# Patient Record
Sex: MALE | Race: BLACK OR AFRICAN AMERICAN | NOT HISPANIC OR LATINO | Employment: OTHER | URBAN - METROPOLITAN AREA
[De-identification: names, ages, dates, MRNs, and addresses within clinical notes are randomized per-mention and may not be internally consistent; named-entity substitution may affect disease eponyms.]

---

## 2023-03-08 ENCOUNTER — NURSING HOME VISIT (OUTPATIENT)
Dept: POST ACUTE CARE | Facility: EXTERNAL LOCATION | Age: 68
End: 2023-03-08
Payer: COMMERCIAL

## 2023-03-08 DIAGNOSIS — F20.9 SCHIZOPHRENIA, UNSPECIFIED TYPE (MULTI): ICD-10-CM

## 2023-03-08 DIAGNOSIS — R13.10 DYSPHAGIA, UNSPECIFIED TYPE: ICD-10-CM

## 2023-03-08 DIAGNOSIS — R05.1 ACUTE COUGH: Primary | ICD-10-CM

## 2023-03-08 DIAGNOSIS — M54.50 LOW BACK PAIN, UNSPECIFIED BACK PAIN LATERALITY, UNSPECIFIED CHRONICITY, UNSPECIFIED WHETHER SCIATICA PRESENT: ICD-10-CM

## 2023-03-08 PROCEDURE — 99309 SBSQ NF CARE MODERATE MDM 30: CPT | Performed by: REGISTERED NURSE

## 2023-03-08 NOTE — LETTER
Patient: Brannon Engel  : 1955    Encounter Date: 2023    Subjective  Chief complaint: Brannon Engel is a 68 y.o. male who is a long term resident patient being seen and evaluated for multiple medical problems.  Patient presents for cough     HPI:  HPI  patient has history of dysphagia refusing advised to use thickened liquids continues to use thin liquids history of recurrent pneumonia likely due to aspiration.  Started coughing approximately 1 to 2 days ago nurse notified me ordered DuoNeb aerosols and Mucinex and chest x-ray.  Patient seen at bedside denies shortness of breath fever chills    Review of Systems   Respiratory:  Positive for cough.      All systems reviewed and negative except for what was mentioned in the HPI    Vital signs: 132/90  97.2  98%  89    Objective  Physical Exam    Assessment/Plan  Problem List Items Addressed This Visit          Digestive    Dysphagia       Other    Acute cough - Primary    Schizophrenia (CMS/HCC)    Low back pain     Medications, treatments, and labs reviewed  Continue medications and treatments as listed in PCC        Electronically Signed By: PARI Leyva   3/30/23 11:34 AM

## 2023-03-08 NOTE — PROGRESS NOTES
Subjective   Chief complaint: Brannon Engel is a 68 y.o. male who is a long term resident patient being seen and evaluated for multiple medical problems.  Patient presents for cough     HPI:  HPI  patient has history of dysphagia refusing advised to use thickened liquids continues to use thin liquids history of recurrent pneumonia likely due to aspiration.  Started coughing approximately 1 to 2 days ago nurse notified me ordered DuoNeb aerosols and Mucinex and chest x-ray.  Patient seen at bedside denies shortness of breath fever chills    Review of Systems   Respiratory:  Positive for cough.      All systems reviewed and negative except for what was mentioned in the HPI    Vital signs: 132/90  97.2  98%  89    Objective   Physical Exam    Assessment/Plan   Problem List Items Addressed This Visit          Digestive    Dysphagia       Other    Acute cough - Primary    Schizophrenia (CMS/HCC)    Low back pain     Medications, treatments, and labs reviewed  Continue medications and treatments as listed in PCC

## 2023-03-20 ENCOUNTER — NURSING HOME VISIT (OUTPATIENT)
Dept: POST ACUTE CARE | Facility: EXTERNAL LOCATION | Age: 68
End: 2023-03-20
Payer: COMMERCIAL

## 2023-03-20 DIAGNOSIS — R13.10 DYSPHAGIA, UNSPECIFIED TYPE: ICD-10-CM

## 2023-03-20 DIAGNOSIS — R05.1 ACUTE COUGH: Primary | ICD-10-CM

## 2023-03-20 DIAGNOSIS — M54.50 LOW BACK PAIN, UNSPECIFIED BACK PAIN LATERALITY, UNSPECIFIED CHRONICITY, UNSPECIFIED WHETHER SCIATICA PRESENT: ICD-10-CM

## 2023-03-20 DIAGNOSIS — M19.90 OSTEOARTHRITIS, UNSPECIFIED OSTEOARTHRITIS TYPE, UNSPECIFIED SITE: ICD-10-CM

## 2023-03-20 DIAGNOSIS — F20.9 SCHIZOPHRENIA, UNSPECIFIED TYPE (MULTI): ICD-10-CM

## 2023-03-20 PROCEDURE — 99308 SBSQ NF CARE LOW MDM 20: CPT | Performed by: INTERNAL MEDICINE

## 2023-03-20 NOTE — LETTER
Patient: Brannon Engel  : 1955    Encounter Date: 2023    PROGRESS NOTE    Subjective  Chief complaint: Brannon Engel is a 68 y.o. male who is a long term resident patient being seen and evaluated for follow-up of PNA and change in diet.    HPI:  He has completed his course of Levaquin.   Speech therapy downgraded his diet to mechanical soft and thickened liquids but patient has been refusing.      Objective  Vital signs: 132/90,   Physical Exam  Constitutional:       Appearance: Normal appearance.   HENT:      Head: Normocephalic and atraumatic.      Nose: Nose normal.      Mouth/Throat:      Mouth: Mucous membranes are moist.      Pharynx: Oropharynx is clear.   Eyes:      Extraocular Movements: Extraocular movements intact.      Pupils: Pupils are equal, round, and reactive to light.   Cardiovascular:      Rate and Rhythm: Normal rate and regular rhythm.   Pulmonary:      Effort: No respiratory distress.      Breath sounds: Normal breath sounds. No wheezing, rhonchi or rales.   Abdominal:      General: Bowel sounds are normal. There is no distension.      Palpations: Abdomen is soft.      Tenderness: There is no abdominal tenderness. There is no guarding.   Musculoskeletal:      Right lower leg: No edema.      Left lower leg: No edema.   Skin:     General: Skin is warm and dry.   Neurological:      Mental Status: He is alert and oriented to person, place, and time. Mental status is at baseline.   Psychiatric:         Mood and Affect: Mood normal.         Behavior: Behavior normal.         Assessment/Plan  Problem List Items Addressed This Visit          Digestive    Dysphagia       Musculoskeletal    Osteoarthritis       Other    Acute cough - Primary    Schizophrenia (CMS/HCC)    Low back pain     Medications, treatments, and labs reviewed  Continue medications and treatments as listed in [facility]    Scribe Attestation  I, Patricia Madrid , Scribe   attest that this documentation has been prepared under  the direction and in the presence of Santana Bryson DO    Provider Attestation - Scribe documentation  All medical record entries made by the Scribe were at my direction and personally dictated by me. I have reviewed the chart and agree that the record accurately reflects my personal performance of the history, physical exam, discussion and plan.   Santana Bryson DO        Electronically Signed By: Santana Bryson DO   5/31/23  8:27 PM

## 2023-03-21 NOTE — PROGRESS NOTES
PROGRESS NOTE    Subjective   Chief complaint: Brannon Engel is a 68 y.o. male who is a long term resident patient being seen and evaluated for follow-up of PNA and change in diet.    HPI:  He has completed his course of Levaquin.   Speech therapy downgraded his diet to mechanical soft and thickened liquids but patient has been refusing.      Objective   Vital signs: 132/90,   Physical Exam  Constitutional:       Appearance: Normal appearance.   HENT:      Head: Normocephalic and atraumatic.      Nose: Nose normal.      Mouth/Throat:      Mouth: Mucous membranes are moist.      Pharynx: Oropharynx is clear.   Eyes:      Extraocular Movements: Extraocular movements intact.      Pupils: Pupils are equal, round, and reactive to light.   Cardiovascular:      Rate and Rhythm: Normal rate and regular rhythm.   Pulmonary:      Effort: No respiratory distress.      Breath sounds: Normal breath sounds. No wheezing, rhonchi or rales.   Abdominal:      General: Bowel sounds are normal. There is no distension.      Palpations: Abdomen is soft.      Tenderness: There is no abdominal tenderness. There is no guarding.   Musculoskeletal:      Right lower leg: No edema.      Left lower leg: No edema.   Skin:     General: Skin is warm and dry.   Neurological:      Mental Status: He is alert and oriented to person, place, and time. Mental status is at baseline.   Psychiatric:         Mood and Affect: Mood normal.         Behavior: Behavior normal.         Assessment/Plan   Problem List Items Addressed This Visit          Digestive    Dysphagia       Musculoskeletal    Osteoarthritis       Other    Acute cough - Primary    Schizophrenia (CMS/HCC)    Low back pain     Medications, treatments, and labs reviewed  Continue medications and treatments as listed in [facility]    Scribe Attestation  I, Ophelia Tamez   attest that this documentation has been prepared under the direction and in the presence of Santana Bryson,  DO    Provider Attestation - Scribe documentation  All medical record entries made by the Scribe were at my direction and personally dictated by me. I have reviewed the chart and agree that the record accurately reflects my personal performance of the history, physical exam, discussion and plan.   Santana Bryson, DO

## 2023-03-30 PROBLEM — R05.1 ACUTE COUGH: Status: ACTIVE | Noted: 2023-03-30

## 2023-03-30 PROBLEM — R13.10 DYSPHAGIA: Status: ACTIVE | Noted: 2023-03-30

## 2023-03-30 PROBLEM — F20.9 SCHIZOPHRENIA (MULTI): Status: ACTIVE | Noted: 2023-03-30

## 2023-03-30 PROBLEM — M54.50 LOW BACK PAIN: Status: ACTIVE | Noted: 2023-03-30

## 2023-03-30 ASSESSMENT — ENCOUNTER SYMPTOMS: COUGH: 1

## 2023-04-14 ENCOUNTER — NURSING HOME VISIT (OUTPATIENT)
Dept: POST ACUTE CARE | Facility: EXTERNAL LOCATION | Age: 68
End: 2023-04-14
Payer: COMMERCIAL

## 2023-04-14 DIAGNOSIS — M19.90 OSTEOARTHRITIS, UNSPECIFIED OSTEOARTHRITIS TYPE, UNSPECIFIED SITE: Primary | ICD-10-CM

## 2023-04-14 PROCEDURE — 99308 SBSQ NF CARE LOW MDM 20: CPT | Performed by: REGISTERED NURSE

## 2023-04-14 NOTE — LETTER
Patient: Brannon Engel  : 1955    Encounter Date: 2023    PROGRESS NOTE    Subjective  Chief complaint: Brannon Engel is a 68 y.o. male who is a long term care patient being seen and evaluated for knee pain    HPI:  HPI patient complaining of knee pain left knee pain 8 out of 10 patient found to be keeping ibuprofen at bedside and will DC  Objective  Vital signs:   133/68, 97.3, 76, 94%,   Physical Exam  Constitutional:       General: He is not in acute distress.  Eyes:      Extraocular Movements: Extraocular movements intact.   Pulmonary:      Effort: Pulmonary effort is normal.   Musculoskeletal:      Cervical back: Neck supple.   Neurological:      Mental Status: He is alert.   Psychiatric:         Mood and Affect: Mood normal.         Behavior: Behavior is cooperative.         Assessment/Plan  Problem List Items Addressed This Visit          Musculoskeletal    Osteoarthritis - Primary     Routine Tylenol continue lidocaine cream          Medications, treatments, and labs reviewed  Continue medications and treatments as listed in [facility]    Scribe Attestation  Patricia GIL Scribe   attest that this documentation has been prepared under the direction and in the presence of PARI Leyva    Provider Attestation - Scribe documentation  All medical record entries made by the Scribe were at my direction and personally dictated by me. I have reviewed the chart and agree that the record accurately reflects my personal performance of the history, physical exam, discussion and plan.   PARI Leyva        Electronically Signed By: PARI Leyva   5/15/23 12:31 PM

## 2023-04-14 NOTE — PROGRESS NOTES
PROGRESS NOTE    Subjective   Chief complaint: Brannon Engel is a 68 y.o. male who is a long term care patient being seen and evaluated for knee pain    HPI:  HPI patient complaining of knee pain left knee pain 8 out of 10 patient found to be keeping ibuprofen at bedside and will DC  Objective   Vital signs:   133/68, 97.3, 76, 94%,   Physical Exam  Constitutional:       General: He is not in acute distress.  Eyes:      Extraocular Movements: Extraocular movements intact.   Pulmonary:      Effort: Pulmonary effort is normal.   Musculoskeletal:      Cervical back: Neck supple.   Neurological:      Mental Status: He is alert.   Psychiatric:         Mood and Affect: Mood normal.         Behavior: Behavior is cooperative.         Assessment/Plan   Problem List Items Addressed This Visit          Musculoskeletal    Osteoarthritis - Primary     Routine Tylenol continue lidocaine cream          Medications, treatments, and labs reviewed  Continue medications and treatments as listed in [facility]    Scribe Attestation  Patricia GIL Scribe   attest that this documentation has been prepared under the direction and in the presence of PARI Leyva    Provider Attestation - Scribe documentation  All medical record entries made by the Scribe were at my direction and personally dictated by me. I have reviewed the chart and agree that the record accurately reflects my personal performance of the history, physical exam, discussion and plan.   PARI Leyva

## 2023-05-15 PROBLEM — M19.90 OSTEOARTHRITIS: Status: ACTIVE | Noted: 2023-05-15

## 2023-05-19 ENCOUNTER — NURSING HOME VISIT (OUTPATIENT)
Dept: POST ACUTE CARE | Facility: EXTERNAL LOCATION | Age: 68
End: 2023-05-19
Payer: COMMERCIAL

## 2023-05-19 DIAGNOSIS — F20.9 SCHIZOPHRENIA, UNSPECIFIED TYPE (MULTI): ICD-10-CM

## 2023-05-19 DIAGNOSIS — M19.90 OSTEOARTHRITIS, UNSPECIFIED OSTEOARTHRITIS TYPE, UNSPECIFIED SITE: ICD-10-CM

## 2023-05-19 DIAGNOSIS — I25.118 ATHEROSCLEROTIC HEART DISEASE OF NATIVE CORONARY ARTERY WITH OTHER FORMS OF ANGINA PECTORIS (CMS-HCC): ICD-10-CM

## 2023-05-19 DIAGNOSIS — Z79.4 TYPE 2 DIABETES MELLITUS WITH DIABETIC NEUROPATHY, WITH LONG-TERM CURRENT USE OF INSULIN (MULTI): ICD-10-CM

## 2023-05-19 DIAGNOSIS — F14.10 COCAINE ABUSE, UNCOMPLICATED (MULTI): Primary | ICD-10-CM

## 2023-05-19 DIAGNOSIS — M54.50 LOW BACK PAIN, UNSPECIFIED BACK PAIN LATERALITY, UNSPECIFIED CHRONICITY, UNSPECIFIED WHETHER SCIATICA PRESENT: ICD-10-CM

## 2023-05-19 DIAGNOSIS — E11.40 TYPE 2 DIABETES MELLITUS WITH DIABETIC NEUROPATHY, WITH LONG-TERM CURRENT USE OF INSULIN (MULTI): ICD-10-CM

## 2023-05-19 PROCEDURE — 99305 1ST NF CARE MODERATE MDM 35: CPT | Performed by: INTERNAL MEDICINE

## 2023-05-19 NOTE — LETTER
Patient: Brannon Engel  : 1955    Encounter Date: 2023    HISTORY & PHYSICAL    Subjective  Chief complaint: Brannon Engel is a 68 y.o. male who is a long term resident patient being seen and evaluated for multiple medical problems.  Patient presents for weakness.    HPI:  Patient was admitted to long term care facility. Pt has a history of GERD, HLD, and PVD. No acute complaints. No issues per nursing.         Past Medical History:   Diagnosis Date   • Dysphagia    • Emphysema lung (CMS/[facility])    • GERD (gastroesophageal reflux disease)    • HLD (hyperlipidemia)    • Hypertension, essential    • Low back pain    • PVD (peripheral vascular disease) (CMS/[facility])    • Schizophrenia (CMS/[facility])    • Type 2 diabetes mellitus (CMS/[facility])        No past surgical history on file.    Family History   Problem Relation Name Age of Onset   • No Known Problems Mother     • No Known Problems Father         Social History     Socioeconomic History   • Marital status: Single     Spouse name: Not on file   • Number of children: Not on file   • Years of education: Not on file   • Highest education level: Not on file   Occupational History   • Not on file   Tobacco Use   • Smoking status: Not on file   • Smokeless tobacco: Not on file   Substance and Sexual Activity   • Alcohol use: Not on file   • Drug use: Not on file   • Sexual activity: Not on file   Other Topics Concern   • Not on file   Social History Narrative   • Not on file     Social Determinants of Health     Financial Resource Strain: Not on file   Food Insecurity: Not on file   Transportation Needs: Not on file   Physical Activity: Not on file   Stress: Not on file   Social Connections: Not on file   Intimate Partner Violence: Not on file   Housing Stability: Not on file       Vital signs: 125/84, 98.3, 104, 18, 212.0, 95%    Objective  Physical Exam  HENT:      Head: Normocephalic and atraumatic.      Nose: Nose normal.      Mouth/Throat:      Mouth: Mucous  membranes are moist.      Pharynx: Oropharynx is clear.   Eyes:      Extraocular Movements: Extraocular movements intact.      Pupils: Pupils are equal, round, and reactive to light.   Cardiovascular:      Rate and Rhythm: Normal rate and regular rhythm.   Pulmonary:      Effort: No respiratory distress.      Breath sounds: Normal breath sounds. No wheezing, rhonchi or rales.   Abdominal:      General: Bowel sounds are normal. There is no distension.      Palpations: Abdomen is soft.      Tenderness: There is no abdominal tenderness. There is no guarding.   Musculoskeletal:      Right lower leg: No edema.      Left lower leg: No edema.   Skin:     General: Skin is warm and dry.   Neurological:      Mental Status: He is alert. Mental status is at baseline.   Psychiatric:         Mood and Affect: Mood normal.         Behavior: Behavior normal.         Assessment/Plan  Problem List Items Addressed This Visit       Schizophrenia (CMS/[facility])    Low back pain    Osteoarthritis    Cocaine abuse, uncomplicated (CMS/[facility]) - Primary    Type 2 diabetes mellitus with diabetic neuropathy, with long-term current use of insulin (CMS/[facility])    Atherosclerotic heart disease of native coronary artery with other forms of angina pectoris (CMS/[facility])     Medications, treatments, and labs reviewed  Continue medications and treatments as listed in PCC    Scribe Attestation  I, Ophelia Alicia   attest that this documentation has been prepared under the direction and in the presence of Santana Bryson DO.    An interactive audio and/or video telecommunication system which permits real time communications between the patient (at the originating site) and provider (at a distant site) was utilized to provide this telehealth service after obtaining verbal consent.    Provider Attestation - Scribe documentation  All medical record entries made by the Scribe were at my direction and personally dictated by me. I have reviewed the chart and  agree that the record accurately reflects my personal performance of the history, physical exam, discussion and plan.    Santana Bryson DO          Electronically Signed By: Santana Bryson DO   6/16/23 12:40 PM

## 2023-05-24 ENCOUNTER — NURSING HOME VISIT (OUTPATIENT)
Dept: POST ACUTE CARE | Facility: EXTERNAL LOCATION | Age: 68
End: 2023-05-24
Payer: COMMERCIAL

## 2023-05-24 DIAGNOSIS — M54.50 LOW BACK PAIN, UNSPECIFIED BACK PAIN LATERALITY, UNSPECIFIED CHRONICITY, UNSPECIFIED WHETHER SCIATICA PRESENT: ICD-10-CM

## 2023-05-24 DIAGNOSIS — M19.90 OSTEOARTHRITIS, UNSPECIFIED OSTEOARTHRITIS TYPE, UNSPECIFIED SITE: ICD-10-CM

## 2023-05-24 DIAGNOSIS — R13.10 DYSPHAGIA, UNSPECIFIED TYPE: Primary | ICD-10-CM

## 2023-05-24 PROCEDURE — 99308 SBSQ NF CARE LOW MDM 20: CPT | Performed by: REGISTERED NURSE

## 2023-05-24 NOTE — LETTER
Patient: Brannon Engel  : 1955    Encounter Date: 2023    PROGRESS NOTE    Subjective  Chief complaint: Brannon Engel is a 68 y.o. male who is a acute skilled care patient being seen and evaluated for weakness.    HPI:  23   Patient has been working in therapy on BLE strength training to improve balance, gait and stability.  Also working on gait training with focus on turning stability.  Patient is stable without complaint.  No new concerns reported by nursing.      Objective  Vital signs:  18, 125/84, 97.0, 104, 96%,   Physical Exam  Constitutional:       General: He is not in acute distress.  Eyes:      Extraocular Movements: Extraocular movements intact.   Pulmonary:      Effort: Pulmonary effort is normal.   Musculoskeletal:      Cervical back: Neck supple.   Neurological:      Mental Status: He is alert.   Psychiatric:         Mood and Affect: Mood normal.         Behavior: Behavior is cooperative.         Assessment/Plan  Problem List Items Addressed This Visit          Digestive    Dysphagia - Primary     Work with speech therapy as needed            Musculoskeletal    Osteoarthritis     Routine Tylenol continue lidocaine cream            Other    Low back pain     Pain stable monitor          Medications, treatments, and labs reviewed  Continue medications and treatments as listed in [facility]    Scribe Attestation  IPatricia Scribe   attest that this documentation has been prepared under the direction and in the presence of PARI Leyva    Provider Attestation - Scribe documentation  All medical record entries made by the Scribe were at my direction and personally dictated by me. I have reviewed the chart and agree that the record accurately reflects my personal performance of the history, physical exam, discussion and plan.   PARI Leyva        Electronically Signed By: PARI Leyva   6/15/23 11:27 AM

## 2023-06-16 PROBLEM — Z79.4 TYPE 2 DIABETES MELLITUS WITH DIABETIC NEUROPATHY, WITH LONG-TERM CURRENT USE OF INSULIN (MULTI): Status: ACTIVE | Noted: 2023-06-16

## 2023-06-16 PROBLEM — I25.118 ATHEROSCLEROTIC HEART DISEASE OF NATIVE CORONARY ARTERY WITH OTHER FORMS OF ANGINA PECTORIS (CMS-HCC): Status: ACTIVE | Noted: 2023-06-16

## 2023-06-16 PROBLEM — F14.10 COCAINE ABUSE, UNCOMPLICATED (MULTI): Status: ACTIVE | Noted: 2023-06-16

## 2023-06-16 PROBLEM — E11.40 TYPE 2 DIABETES MELLITUS WITH DIABETIC NEUROPATHY, WITH LONG-TERM CURRENT USE OF INSULIN (MULTI): Status: ACTIVE | Noted: 2023-06-16

## 2023-06-16 NOTE — PROGRESS NOTES
HISTORY & PHYSICAL    Subjective   Chief complaint: Brannon Engel is a 68 y.o. male who is a long term resident patient being seen and evaluated for multiple medical problems.  Patient presents for weakness.    HPI:  Patient was admitted to long term care facility. Pt has a history of GERD, HLD, and PVD. No acute complaints. No issues per nursing.         Past Medical History:   Diagnosis Date    Dysphagia     Emphysema lung (CMS/[facility])     GERD (gastroesophageal reflux disease)     HLD (hyperlipidemia)     Hypertension, essential     Low back pain     PVD (peripheral vascular disease) (CMS/[facility])     Schizophrenia (CMS/[facility])     Type 2 diabetes mellitus (CMS/[facility])        No past surgical history on file.    Family History   Problem Relation Name Age of Onset    No Known Problems Mother      No Known Problems Father         Social History     Socioeconomic History    Marital status: Single     Spouse name: Not on file    Number of children: Not on file    Years of education: Not on file    Highest education level: Not on file   Occupational History    Not on file   Tobacco Use    Smoking status: Not on file    Smokeless tobacco: Not on file   Substance and Sexual Activity    Alcohol use: Not on file    Drug use: Not on file    Sexual activity: Not on file   Other Topics Concern    Not on file   Social History Narrative    Not on file     Social Determinants of Health     Financial Resource Strain: Not on file   Food Insecurity: Not on file   Transportation Needs: Not on file   Physical Activity: Not on file   Stress: Not on file   Social Connections: Not on file   Intimate Partner Violence: Not on file   Housing Stability: Not on file       Vital signs: 125/84, 98.3, 104, 18, 212.0, 95%    Objective   Physical Exam  HENT:      Head: Normocephalic and atraumatic.      Nose: Nose normal.      Mouth/Throat:      Mouth: Mucous membranes are moist.      Pharynx: Oropharynx is clear.   Eyes:      Extraocular Movements:  Extraocular movements intact.      Pupils: Pupils are equal, round, and reactive to light.   Cardiovascular:      Rate and Rhythm: Normal rate and regular rhythm.   Pulmonary:      Effort: No respiratory distress.      Breath sounds: Normal breath sounds. No wheezing, rhonchi or rales.   Abdominal:      General: Bowel sounds are normal. There is no distension.      Palpations: Abdomen is soft.      Tenderness: There is no abdominal tenderness. There is no guarding.   Musculoskeletal:      Right lower leg: No edema.      Left lower leg: No edema.   Skin:     General: Skin is warm and dry.   Neurological:      Mental Status: He is alert. Mental status is at baseline.   Psychiatric:         Mood and Affect: Mood normal.         Behavior: Behavior normal.         Assessment/Plan   Problem List Items Addressed This Visit       Schizophrenia (CMS/[facility])    Low back pain    Osteoarthritis    Cocaine abuse, uncomplicated (CMS/[facility]) - Primary    Type 2 diabetes mellitus with diabetic neuropathy, with long-term current use of insulin (CMS/[facility])    Atherosclerotic heart disease of native coronary artery with other forms of angina pectoris (CMS/[facility])     Medications, treatments, and labs reviewed  Continue medications and treatments as listed in [facility]    Scribe Attestation  I, Ophelia Alicia   attest that this documentation has been prepared under the direction and in the presence of Santana Bryson DO.    An interactive audio and/or video telecommunication system which permits real time communications between the patient (at the originating site) and provider (at a distant site) was utilized to provide this telehealth service after obtaining verbal consent.    Provider Attestation - Scribe documentation  All medical record entries made by the Scribe were at my direction and personally dictated by me. I have reviewed the chart and agree that the record accurately reflects my personal performance of the history, physical  exam, discussion and plan.    Santana Bryson, DO

## 2023-09-25 LAB
BASOPHILS (10*3/UL) IN BLOOD BY AUTOMATED COUNT: 0.04 X10E9/L (ref 0–0.1)
BASOPHILS/100 LEUKOCYTES IN BLOOD BY AUTOMATED COUNT: 0.4 % (ref 0–2)
EOSINOPHILS (10*3/UL) IN BLOOD BY AUTOMATED COUNT: 0.26 X10E9/L (ref 0–0.7)
EOSINOPHILS/100 LEUKOCYTES IN BLOOD BY AUTOMATED COUNT: 2.9 % (ref 0–6)
ERYTHROCYTE DISTRIBUTION WIDTH (RATIO) BY AUTOMATED COUNT: 14.7 % (ref 11.5–14.5)
ERYTHROCYTE MEAN CORPUSCULAR HEMOGLOBIN CONCENTRATION (G/DL) BY AUTOMATED: 31.8 G/DL (ref 32–36)
ERYTHROCYTE MEAN CORPUSCULAR VOLUME (FL) BY AUTOMATED COUNT: 93 FL (ref 80–100)
ERYTHROCYTES (10*6/UL) IN BLOOD BY AUTOMATED COUNT: 4.37 X10E12/L (ref 4.5–5.9)
HEMATOCRIT (%) IN BLOOD BY AUTOMATED COUNT: 40.6 % (ref 41–52)
HEMOGLOBIN (G/DL) IN BLOOD: 12.9 G/DL (ref 13.5–17.5)
IMMATURE GRANULOCYTES/100 LEUKOCYTES IN BLOOD BY AUTOMATED COUNT: 0.6 % (ref 0–0.9)
LEUKOCYTES (10*3/UL) IN BLOOD BY AUTOMATED COUNT: 9 X10E9/L (ref 4.4–11.3)
LYMPHOCYTES (10*3/UL) IN BLOOD BY AUTOMATED COUNT: 2.37 X10E9/L (ref 1.2–4.8)
LYMPHOCYTES/100 LEUKOCYTES IN BLOOD BY AUTOMATED COUNT: 26.4 % (ref 13–44)
MONOCYTES (10*3/UL) IN BLOOD BY AUTOMATED COUNT: 0.84 X10E9/L (ref 0.1–1)
MONOCYTES/100 LEUKOCYTES IN BLOOD BY AUTOMATED COUNT: 9.4 % (ref 2–10)
NEUTROPHILS (10*3/UL) IN BLOOD BY AUTOMATED COUNT: 5.41 X10E9/L (ref 1.2–7.7)
NEUTROPHILS/100 LEUKOCYTES IN BLOOD BY AUTOMATED COUNT: 60.3 % (ref 40–80)
NRBC (PER 100 WBCS) BY AUTOMATED COUNT: 0 /100 WBC (ref 0–0)
PLATELETS (10*3/UL) IN BLOOD AUTOMATED COUNT: 212 X10E9/L (ref 150–450)

## 2023-11-20 ENCOUNTER — LAB REQUISITION (OUTPATIENT)
Dept: LAB | Facility: HOSPITAL | Age: 68
End: 2023-11-20
Payer: COMMERCIAL

## 2023-11-20 DIAGNOSIS — R06.2 WHEEZING: ICD-10-CM

## 2023-11-20 DIAGNOSIS — R53.1 WEAKNESS: ICD-10-CM

## 2023-11-20 LAB
ALBUMIN SERPL BCP-MCNC: 3.4 G/DL (ref 3.4–5)
ALP SERPL-CCNC: 87 U/L (ref 33–136)
ALT SERPL W P-5'-P-CCNC: 32 U/L (ref 10–52)
ANION GAP SERPL CALC-SCNC: 11 MMOL/L (ref 10–20)
AST SERPL W P-5'-P-CCNC: 27 U/L (ref 9–39)
BASOPHILS # BLD AUTO: 0.06 X10*3/UL (ref 0–0.1)
BASOPHILS NFR BLD AUTO: 0.5 %
BILIRUB SERPL-MCNC: 0.3 MG/DL (ref 0–1.2)
BUN SERPL-MCNC: 9 MG/DL (ref 6–23)
CALCIUM SERPL-MCNC: 9.1 MG/DL (ref 8.6–10.3)
CHLORIDE SERPL-SCNC: 107 MMOL/L (ref 98–107)
CO2 SERPL-SCNC: 26 MMOL/L (ref 21–32)
CREAT SERPL-MCNC: 1.13 MG/DL (ref 0.5–1.3)
EOSINOPHIL # BLD AUTO: 0.29 X10*3/UL (ref 0–0.7)
EOSINOPHIL NFR BLD AUTO: 2.5 %
ERYTHROCYTE [DISTWIDTH] IN BLOOD BY AUTOMATED COUNT: 14.4 % (ref 11.5–14.5)
GFR SERPL CREATININE-BSD FRML MDRD: 72 ML/MIN/1.73M*2
GLUCOSE SERPL-MCNC: 119 MG/DL (ref 74–99)
HCT VFR BLD AUTO: 44.8 % (ref 41–52)
HGB BLD-MCNC: 14.2 G/DL (ref 13.5–17.5)
IMM GRANULOCYTES # BLD AUTO: 0.05 X10*3/UL (ref 0–0.7)
IMM GRANULOCYTES NFR BLD AUTO: 0.4 % (ref 0–0.9)
LYMPHOCYTES # BLD AUTO: 2.33 X10*3/UL (ref 1.2–4.8)
LYMPHOCYTES NFR BLD AUTO: 20.1 %
MCH RBC QN AUTO: 29.8 PG (ref 26–34)
MCHC RBC AUTO-ENTMCNC: 31.7 G/DL (ref 32–36)
MCV RBC AUTO: 94 FL (ref 80–100)
MONOCYTES # BLD AUTO: 1.34 X10*3/UL (ref 0.1–1)
MONOCYTES NFR BLD AUTO: 11.6 %
NEUTROPHILS # BLD AUTO: 7.53 X10*3/UL (ref 1.2–7.7)
NEUTROPHILS NFR BLD AUTO: 64.9 %
NRBC BLD-RTO: 0 /100 WBCS (ref 0–0)
PLATELET # BLD AUTO: 310 X10*3/UL (ref 150–450)
POTASSIUM SERPL-SCNC: 4.3 MMOL/L (ref 3.5–5.3)
PROT SERPL-MCNC: 6.8 G/DL (ref 6.4–8.2)
RBC # BLD AUTO: 4.77 X10*6/UL (ref 4.5–5.9)
SODIUM SERPL-SCNC: 140 MMOL/L (ref 136–145)
WBC # BLD AUTO: 11.6 X10*3/UL (ref 4.4–11.3)

## 2023-11-20 PROCEDURE — 85025 COMPLETE CBC W/AUTO DIFF WBC: CPT

## 2023-11-20 PROCEDURE — 80053 COMPREHEN METABOLIC PANEL: CPT

## 2024-09-05 ENCOUNTER — HOSPITAL ENCOUNTER (EMERGENCY)
Facility: HOSPITAL | Age: 69
Discharge: HOME | End: 2024-09-06
Attending: EMERGENCY MEDICINE
Payer: COMMERCIAL

## 2024-09-05 ENCOUNTER — APPOINTMENT (OUTPATIENT)
Dept: RADIOLOGY | Facility: HOSPITAL | Age: 69
End: 2024-09-05
Payer: COMMERCIAL
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
## 2024-09-05 DIAGNOSIS — S80.00XA CONTUSION OF KNEE, UNSPECIFIED LATERALITY, INITIAL ENCOUNTER: ICD-10-CM

## 2024-09-05 LAB — GLUCOSE BLD MANUAL STRIP-MCNC: 77 MG/DL (ref 74–99)

## 2024-09-05 PROCEDURE — 72125 CT NECK SPINE W/O DYE: CPT

## 2024-09-05 PROCEDURE — 70450 CT HEAD/BRAIN W/O DYE: CPT | Performed by: RADIOLOGY

## 2024-09-05 PROCEDURE — 99285 EMERGENCY DEPT VISIT HI MDM: CPT

## 2024-09-05 PROCEDURE — 82947 ASSAY GLUCOSE BLOOD QUANT: CPT

## 2024-09-05 PROCEDURE — 72125 CT NECK SPINE W/O DYE: CPT | Performed by: RADIOLOGY

## 2024-09-05 PROCEDURE — 73564 X-RAY EXAM KNEE 4 OR MORE: CPT | Mod: 50

## 2024-09-05 PROCEDURE — 70450 CT HEAD/BRAIN W/O DYE: CPT

## 2024-09-05 PROCEDURE — 73564 X-RAY EXAM KNEE 4 OR MORE: CPT | Mod: BILATERAL PROCEDURE | Performed by: SURGERY

## 2024-09-05 RX ORDER — ACETAMINOPHEN 325 MG/1
975 TABLET ORAL ONCE
Status: DISCONTINUED | OUTPATIENT
Start: 2024-09-05 | End: 2024-09-06 | Stop reason: HOSPADM

## 2024-09-05 ASSESSMENT — PAIN DESCRIPTION - LOCATION: LOCATION: KNEE

## 2024-09-05 ASSESSMENT — PAIN DESCRIPTION - PROGRESSION: CLINICAL_PROGRESSION: NOT CHANGED

## 2024-09-05 ASSESSMENT — PAIN DESCRIPTION - ONSET: ONSET: ONGOING

## 2024-09-05 ASSESSMENT — PAIN - FUNCTIONAL ASSESSMENT: PAIN_FUNCTIONAL_ASSESSMENT: 0-10

## 2024-09-05 ASSESSMENT — PAIN DESCRIPTION - FREQUENCY: FREQUENCY: CONSTANT/CONTINUOUS

## 2024-09-05 ASSESSMENT — PAIN DESCRIPTION - ORIENTATION: ORIENTATION: LEFT;RIGHT

## 2024-09-05 ASSESSMENT — PAIN SCALES - GENERAL: PAINLEVEL_OUTOF10: 7

## 2024-09-06 VITALS
SYSTOLIC BLOOD PRESSURE: 131 MMHG | HEART RATE: 67 BPM | DIASTOLIC BLOOD PRESSURE: 76 MMHG | HEIGHT: 68 IN | WEIGHT: 170 LBS | OXYGEN SATURATION: 94 % | TEMPERATURE: 97.9 F | RESPIRATION RATE: 16 BRPM | BODY MASS INDEX: 25.76 KG/M2

## 2024-09-06 NOTE — ED TRIAGE NOTES
PT BIBA FROM NURSING FACILITY AFTER UNWITNESSED FALL. PT STATES PAIN TO BILATERAL KNEES. DENIES PAIN IN NECK, LOC, AND BACK PAIN. PT TAKES ASA DAILY.

## 2024-09-06 NOTE — ED PROVIDER NOTES
HPI   No chief complaint on file.      HPI  The patient states he fell yesterday morning and hit bilateral knees and his head.  He denies any loss consciousness.  The patient is currently oriented x 3.  He lives in a senior living facility.  He denies injury to his torso.  He denies any fever, cough or vomiting.  States that he only has pain in his knees at this time.      Patient History   Past Medical History:   Diagnosis Date    Dysphagia     Emphysema lung (Multi)     GERD (gastroesophageal reflux disease)     HLD (hyperlipidemia)     Hypertension, essential     Low back pain     PVD (peripheral vascular disease) (CMS-[facility])     Schizophrenia (Multi)     Type 2 diabetes mellitus (Multi)      No past surgical history on file.  Family History   Problem Relation Name Age of Onset    No Known Problems Mother      No Known Problems Father       Social History     Tobacco Use    Smoking status: Not on file    Smokeless tobacco: Not on file   Substance Use Topics    Alcohol use: Not on file    Drug use: Not on file       Physical Exam   ED Triage Vitals   Temperature Heart Rate Respirations BP   09/05/24 2136 09/05/24 2138 09/05/24 2136 09/05/24 2138   36.6 °C (97.9 °F) 85 20 105/77      Pulse Ox Temp Source Heart Rate Source Patient Position   09/05/24 2136 09/05/24 2136 09/05/24 2136 09/05/24 2136   97 % Temporal Monitor Sitting      BP Location FiO2 (%)     09/05/24 2136 --     Right arm        Physical Exam  Vitals and nursing note reviewed.   Constitutional:       General: He is not in acute distress.     Appearance: He is well-developed.   HENT:      Head: Normocephalic and atraumatic.   Eyes:      Conjunctiva/sclera: Conjunctivae normal.   Cardiovascular:      Rate and Rhythm: Normal rate and regular rhythm.      Heart sounds: No murmur heard.  Pulmonary:      Effort: Pulmonary effort is normal. No respiratory distress.      Breath sounds: Normal breath sounds.   Abdominal:      Palpations: Abdomen is soft.       Tenderness: There is no abdominal tenderness.   Musculoskeletal:         General: No swelling. Normal range of motion.      Cervical back: Neck supple.   Skin:     General: Skin is warm and dry.      Capillary Refill: Capillary refill takes less than 2 seconds.   Neurological:      Mental Status: He is alert.   Psychiatric:         Mood and Affect: Mood normal.         ED Course & MDM   Diagnoses as of 09/06/24 0625   Fall, initial encounter   Contusion of knee, unspecified laterality, initial encounter                 No data recorded     M[place]ika Coma Scale Score: 15 (09/05/24 2140 : Claudette Cortes RN)                           Medical Decision Making  Patient is full range of motion of his knees.  I doubt any injury here.  Given that he did hit his head and is above 67 obtain a CT of the head and neck. He is fully oriented and denies other complaints.  Will attempt ambulation after negative x-rays .  X-rays negative.  CT negative.  The patient returned back to his senior living facility.    Procedure  Procedures     Gaston Donald MD  09/06/24 0625

## 2024-11-07 ENCOUNTER — APPOINTMENT (OUTPATIENT)
Dept: RADIOLOGY | Facility: HOSPITAL | Age: 69
DRG: 690 | End: 2024-11-07
Payer: COMMERCIAL

## 2024-11-07 ENCOUNTER — HOSPITAL ENCOUNTER (INPATIENT)
Facility: HOSPITAL | Age: 69
End: 2024-11-07
Attending: EMERGENCY MEDICINE | Admitting: FAMILY MEDICINE
Payer: COMMERCIAL
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
## 2024-11-07 DIAGNOSIS — M25.551 PAIN OF RIGHT HIP: ICD-10-CM

## 2024-11-07 PROBLEM — R29.6 FALLS FREQUENTLY: Status: ACTIVE | Noted: 2024-11-07

## 2024-11-07 LAB
ALBUMIN SERPL BCP-MCNC: 3.8 G/DL (ref 3.4–5)
ALP SERPL-CCNC: 99 U/L (ref 33–136)
ALT SERPL W P-5'-P-CCNC: 25 U/L (ref 10–52)
ANION GAP SERPL CALC-SCNC: 11 MMOL/L (ref 10–20)
AST SERPL W P-5'-P-CCNC: 37 U/L (ref 9–39)
BASOPHILS # BLD AUTO: 0.05 X10*3/UL (ref 0–0.1)
BASOPHILS NFR BLD AUTO: 0.3 %
BILIRUB SERPL-MCNC: 0.6 MG/DL (ref 0–1.2)
BUN SERPL-MCNC: 9 MG/DL (ref 6–23)
CALCIUM SERPL-MCNC: 10 MG/DL (ref 8.6–10.3)
CHLORIDE SERPL-SCNC: 99 MMOL/L (ref 98–107)
CO2 SERPL-SCNC: 24 MMOL/L (ref 21–32)
CREAT SERPL-MCNC: 1.08 MG/DL (ref 0.5–1.3)
EGFRCR SERPLBLD CKD-EPI 2021: 75 ML/MIN/1.73M*2
EOSINOPHIL # BLD AUTO: 0.17 X10*3/UL (ref 0–0.7)
EOSINOPHIL NFR BLD AUTO: 1.1 %
ERYTHROCYTE [DISTWIDTH] IN BLOOD BY AUTOMATED COUNT: 13.3 % (ref 11.5–14.5)
GLUCOSE BLD MANUAL STRIP-MCNC: 142 MG/DL (ref 74–99)
GLUCOSE BLD MANUAL STRIP-MCNC: 147 MG/DL (ref 74–99)
GLUCOSE SERPL-MCNC: 90 MG/DL (ref 74–99)
HCT VFR BLD AUTO: 44.7 % (ref 41–52)
HGB BLD-MCNC: 14.6 G/DL (ref 13.5–17.5)
IMM GRANULOCYTES # BLD AUTO: 0.06 X10*3/UL (ref 0–0.7)
IMM GRANULOCYTES NFR BLD AUTO: 0.4 % (ref 0–0.9)
LYMPHOCYTES # BLD AUTO: 1.54 X10*3/UL (ref 1.2–4.8)
LYMPHOCYTES NFR BLD AUTO: 9.9 %
MCH RBC QN AUTO: 29.7 PG (ref 26–34)
MCHC RBC AUTO-ENTMCNC: 32.7 G/DL (ref 32–36)
MCV RBC AUTO: 91 FL (ref 80–100)
MONOCYTES # BLD AUTO: 1.39 X10*3/UL (ref 0.1–1)
MONOCYTES NFR BLD AUTO: 9 %
NEUTROPHILS # BLD AUTO: 12.31 X10*3/UL (ref 1.2–7.7)
NEUTROPHILS NFR BLD AUTO: 79.3 %
NRBC BLD-RTO: 0 /100 WBCS (ref 0–0)
PLATELET # BLD AUTO: 290 X10*3/UL (ref 150–450)
POTASSIUM SERPL-SCNC: 5 MMOL/L (ref 3.5–5.3)
PROT SERPL-MCNC: 7.1 G/DL (ref 6.4–8.2)
RBC # BLD AUTO: 4.91 X10*6/UL (ref 4.5–5.9)
SODIUM SERPL-SCNC: 129 MMOL/L (ref 136–145)
WBC # BLD AUTO: 15.5 X10*3/UL (ref 4.4–11.3)

## 2024-11-07 PROCEDURE — G0378 HOSPITAL OBSERVATION PER HR: HCPCS

## 2024-11-07 PROCEDURE — 72125 CT NECK SPINE W/O DYE: CPT

## 2024-11-07 PROCEDURE — 2500000002 HC RX 250 W HCPCS SELF ADMINISTERED DRUGS (ALT 637 FOR MEDICARE OP, ALT 636 FOR OP/ED): Performed by: NURSE PRACTITIONER

## 2024-11-07 PROCEDURE — 84075 ASSAY ALKALINE PHOSPHATASE: CPT | Performed by: EMERGENCY MEDICINE

## 2024-11-07 PROCEDURE — 72125 CT NECK SPINE W/O DYE: CPT | Performed by: RADIOLOGY

## 2024-11-07 PROCEDURE — 2500000001 HC RX 250 WO HCPCS SELF ADMINISTERED DRUGS (ALT 637 FOR MEDICARE OP): Performed by: NURSE PRACTITIONER

## 2024-11-07 PROCEDURE — 82947 ASSAY GLUCOSE BLOOD QUANT: CPT

## 2024-11-07 PROCEDURE — 96372 THER/PROPH/DIAG INJ SC/IM: CPT | Performed by: NURSE PRACTITIONER

## 2024-11-07 PROCEDURE — 2500000001 HC RX 250 WO HCPCS SELF ADMINISTERED DRUGS (ALT 637 FOR MEDICARE OP): Performed by: EMERGENCY MEDICINE

## 2024-11-07 PROCEDURE — 73502 X-RAY EXAM HIP UNI 2-3 VIEWS: CPT | Mod: RT

## 2024-11-07 PROCEDURE — 70450 CT HEAD/BRAIN W/O DYE: CPT | Performed by: RADIOLOGY

## 2024-11-07 PROCEDURE — 36415 COLL VENOUS BLD VENIPUNCTURE: CPT | Performed by: EMERGENCY MEDICINE

## 2024-11-07 PROCEDURE — 85025 COMPLETE CBC W/AUTO DIFF WBC: CPT | Performed by: EMERGENCY MEDICINE

## 2024-11-07 PROCEDURE — 73502 X-RAY EXAM HIP UNI 2-3 VIEWS: CPT | Mod: RIGHT SIDE | Performed by: RADIOLOGY

## 2024-11-07 PROCEDURE — 2500000005 HC RX 250 GENERAL PHARMACY W/O HCPCS: Performed by: NURSE PRACTITIONER

## 2024-11-07 PROCEDURE — 96374 THER/PROPH/DIAG INJ IV PUSH: CPT

## 2024-11-07 PROCEDURE — 99285 EMERGENCY DEPT VISIT HI MDM: CPT | Mod: 25

## 2024-11-07 PROCEDURE — 2500000004 HC RX 250 GENERAL PHARMACY W/ HCPCS (ALT 636 FOR OP/ED): Performed by: EMERGENCY MEDICINE

## 2024-11-07 PROCEDURE — 2500000004 HC RX 250 GENERAL PHARMACY W/ HCPCS (ALT 636 FOR OP/ED): Performed by: NURSE PRACTITIONER

## 2024-11-07 PROCEDURE — 70450 CT HEAD/BRAIN W/O DYE: CPT

## 2024-11-07 RX ORDER — ZOLPIDEM TARTRATE 5 MG/1
5 TABLET ORAL
COMMUNITY
Start: 2024-09-19 | End: 2024-11-20 | Stop reason: HOSPADM

## 2024-11-07 RX ORDER — ESOMEPRAZOLE MAGNESIUM 40 MG/1
40 CAPSULE, DELAYED RELEASE ORAL 2 TIMES DAILY
COMMUNITY

## 2024-11-07 RX ORDER — INSULIN LISPRO 200 [IU]/ML
13 INJECTION, SOLUTION SUBCUTANEOUS
COMMUNITY
Start: 2024-09-17 | End: 2024-11-20 | Stop reason: HOSPADM

## 2024-11-07 RX ORDER — SEMAGLUTIDE 1.34 MG/ML
0.25 INJECTION, SOLUTION SUBCUTANEOUS WEEKLY
COMMUNITY

## 2024-11-07 RX ORDER — ASPIRIN 81 MG/1
81 TABLET ORAL DAILY
COMMUNITY

## 2024-11-07 RX ORDER — BISMUTH SUBSALICYLATE 262 MG
1 TABLET,CHEWABLE ORAL DAILY
COMMUNITY

## 2024-11-07 RX ORDER — FLUTICASONE PROPIONATE 50 MCG
1 SPRAY, SUSPENSION (ML) NASAL DAILY
COMMUNITY

## 2024-11-07 RX ORDER — ATORVASTATIN CALCIUM 80 MG/1
80 TABLET, FILM COATED ORAL DAILY
COMMUNITY

## 2024-11-07 RX ORDER — DOCUSATE SODIUM 100 MG/1
100 CAPSULE, LIQUID FILLED ORAL 2 TIMES DAILY
COMMUNITY

## 2024-11-07 RX ORDER — TRIHEXYPHENIDYL HYDROCHLORIDE 2 MG/1
2 TABLET ORAL 2 TIMES DAILY
COMMUNITY
Start: 2024-09-19

## 2024-11-07 RX ORDER — QUETIAPINE FUMARATE 25 MG/1
TABLET, FILM COATED ORAL
COMMUNITY
Start: 2024-09-19

## 2024-11-07 RX ORDER — LITHIUM CARBONATE 300 MG/1
300 TABLET, FILM COATED, EXTENDED RELEASE ORAL 2 TIMES DAILY
COMMUNITY
Start: 2024-09-19

## 2024-11-07 RX ORDER — TAMSULOSIN HYDROCHLORIDE 0.4 MG/1
0.4 CAPSULE ORAL DAILY
Status: DISCONTINUED | OUTPATIENT
Start: 2024-11-07 | End: 2024-11-20 | Stop reason: HOSPADM

## 2024-11-07 RX ORDER — TRAZODONE HYDROCHLORIDE 50 MG/1
125 TABLET ORAL NIGHTLY
COMMUNITY
Start: 2024-09-19 | End: 2024-11-20 | Stop reason: HOSPADM

## 2024-11-07 RX ORDER — LANOLIN ALCOHOL/MO/W.PET/CERES
1000 CREAM (GRAM) TOPICAL DAILY
COMMUNITY

## 2024-11-07 RX ORDER — VARENICLINE TARTRATE 1 MG/1
1 TABLET, FILM COATED ORAL 2 TIMES DAILY
COMMUNITY

## 2024-11-07 RX ORDER — LITHIUM CARBONATE 300 MG/1
300 TABLET, FILM COATED, EXTENDED RELEASE ORAL 2 TIMES DAILY
Status: DISCONTINUED | OUTPATIENT
Start: 2024-11-07 | End: 2024-11-20 | Stop reason: HOSPADM

## 2024-11-07 RX ORDER — OXYBUTYNIN CHLORIDE 5 MG/1
15 TABLET, EXTENDED RELEASE ORAL DAILY
COMMUNITY
End: 2024-11-20 | Stop reason: HOSPADM

## 2024-11-07 RX ORDER — ACETAMINOPHEN 650 MG/1
650 SUPPOSITORY RECTAL EVERY 4 HOURS PRN
Status: DISCONTINUED | OUTPATIENT
Start: 2024-11-07 | End: 2024-11-20 | Stop reason: HOSPADM

## 2024-11-07 RX ORDER — POLYETHYLENE GLYCOL 3350 17 G/17G
17 POWDER, FOR SOLUTION ORAL DAILY
Status: DISCONTINUED | OUTPATIENT
Start: 2024-11-07 | End: 2024-11-20 | Stop reason: HOSPADM

## 2024-11-07 RX ORDER — ACETAMINOPHEN 500 MG
1000 TABLET ORAL EVERY 8 HOURS PRN
COMMUNITY
End: 2024-11-20 | Stop reason: HOSPADM

## 2024-11-07 RX ORDER — TRAZODONE HYDROCHLORIDE 50 MG/1
50 TABLET ORAL NIGHTLY
Status: DISCONTINUED | OUTPATIENT
Start: 2024-11-07 | End: 2024-11-20 | Stop reason: HOSPADM

## 2024-11-07 RX ORDER — KETOROLAC TROMETHAMINE 30 MG/ML
15 INJECTION, SOLUTION INTRAMUSCULAR; INTRAVENOUS ONCE
Status: COMPLETED | OUTPATIENT
Start: 2024-11-07 | End: 2024-11-07

## 2024-11-07 RX ORDER — QUETIAPINE FUMARATE 25 MG/1
25 TABLET, FILM COATED ORAL NIGHTLY
Status: DISCONTINUED | OUTPATIENT
Start: 2024-11-07 | End: 2024-11-20 | Stop reason: HOSPADM

## 2024-11-07 RX ORDER — DULOXETIN HYDROCHLORIDE 60 MG/1
60 CAPSULE, DELAYED RELEASE ORAL DAILY
COMMUNITY

## 2024-11-07 RX ORDER — TRIAMCINOLONE ACETONIDE 1 MG/G
CREAM TOPICAL 2 TIMES DAILY
COMMUNITY
Start: 2024-04-29 | End: 2024-11-20 | Stop reason: HOSPADM

## 2024-11-07 RX ORDER — HYDROCORTISONE ACETATE 25 MG/1
25 SUPPOSITORY RECTAL 2 TIMES DAILY PRN
COMMUNITY

## 2024-11-07 RX ORDER — TRIHEXYPHENIDYL HYDROCHLORIDE 2 MG/1
2 TABLET ORAL
Status: DISCONTINUED | OUTPATIENT
Start: 2024-11-07 | End: 2024-11-20 | Stop reason: HOSPADM

## 2024-11-07 RX ORDER — ISOSORBIDE MONONITRATE 60 MG/1
60 TABLET, EXTENDED RELEASE ORAL DAILY
COMMUNITY

## 2024-11-07 RX ORDER — IBUPROFEN 200 MG
200 TABLET ORAL EVERY 6 HOURS PRN
COMMUNITY
End: 2024-11-20 | Stop reason: HOSPADM

## 2024-11-07 RX ORDER — INSULIN GLARGINE 100 [IU]/ML
44 INJECTION, SOLUTION SUBCUTANEOUS NIGHTLY
COMMUNITY

## 2024-11-07 RX ORDER — ACETAMINOPHEN 325 MG/1
975 TABLET ORAL ONCE
Status: COMPLETED | OUTPATIENT
Start: 2024-11-07 | End: 2024-11-07

## 2024-11-07 RX ORDER — PREGABALIN 200 MG/1
200 CAPSULE ORAL 2 TIMES DAILY
COMMUNITY

## 2024-11-07 RX ORDER — INSULIN LISPRO 100 [IU]/ML
13 INJECTION, SOLUTION INTRAVENOUS; SUBCUTANEOUS
Status: DISCONTINUED | OUTPATIENT
Start: 2024-11-07 | End: 2024-11-08

## 2024-11-07 RX ORDER — ADHESIVE BANDAGE
30 BANDAGE TOPICAL DAILY PRN
COMMUNITY
End: 2024-11-20 | Stop reason: HOSPADM

## 2024-11-07 RX ORDER — ONDANSETRON 4 MG/1
4 TABLET, ORALLY DISINTEGRATING ORAL ONCE
Status: DISCONTINUED | OUTPATIENT
Start: 2024-11-07 | End: 2024-11-20 | Stop reason: HOSPADM

## 2024-11-07 RX ORDER — ACETAMINOPHEN 325 MG/1
650 TABLET ORAL EVERY 4 HOURS PRN
Status: DISCONTINUED | OUTPATIENT
Start: 2024-11-07 | End: 2024-11-20 | Stop reason: HOSPADM

## 2024-11-07 RX ORDER — ENOXAPARIN SODIUM 100 MG/ML
40 INJECTION SUBCUTANEOUS EVERY 24 HOURS
Status: DISCONTINUED | OUTPATIENT
Start: 2024-11-07 | End: 2024-11-20 | Stop reason: HOSPADM

## 2024-11-07 RX ORDER — DICLOFENAC SODIUM 10 MG/G
4 GEL TOPICAL EVERY 2 HOUR PRN
COMMUNITY

## 2024-11-07 RX ORDER — NITROGLYCERIN 0.4 MG/1
0.4 TABLET SUBLINGUAL EVERY 5 MIN PRN
COMMUNITY

## 2024-11-07 RX ORDER — OXYCODONE HYDROCHLORIDE 5 MG/1
5 CAPSULE ORAL EVERY 6 HOURS PRN
COMMUNITY
End: 2024-11-20 | Stop reason: HOSPADM

## 2024-11-07 RX ORDER — ACETAMINOPHEN 160 MG/5ML
650 SOLUTION ORAL EVERY 4 HOURS PRN
Status: DISCONTINUED | OUTPATIENT
Start: 2024-11-07 | End: 2024-11-20 | Stop reason: HOSPADM

## 2024-11-07 RX ORDER — CHOLECALCIFEROL (VITAMIN D3) 25 MCG
1000 TABLET ORAL DAILY
COMMUNITY

## 2024-11-07 RX ORDER — DEXTROSE 40 %
15 GEL (GRAM) ORAL AS NEEDED
COMMUNITY

## 2024-11-07 RX ORDER — IPRATROPIUM BROMIDE AND ALBUTEROL SULFATE 2.5; .5 MG/3ML; MG/3ML
3 SOLUTION RESPIRATORY (INHALATION)
COMMUNITY
End: 2024-11-20 | Stop reason: HOSPADM

## 2024-11-07 RX ORDER — TAMSULOSIN HYDROCHLORIDE 0.4 MG/1
0.4 CAPSULE ORAL
Status: ON HOLD | COMMUNITY
Start: 2024-10-10 | End: 2024-11-11

## 2024-11-07 RX ORDER — LIDOCAINE 560 MG/1
1 PATCH PERCUTANEOUS; TOPICAL; TRANSDERMAL DAILY
Status: DISCONTINUED | OUTPATIENT
Start: 2024-11-07 | End: 2024-11-20 | Stop reason: HOSPADM

## 2024-11-07 RX ORDER — FERROUS SULFATE 325(65) MG
65 TABLET, DELAYED RELEASE (ENTERIC COATED) ORAL DAILY
COMMUNITY

## 2024-11-07 RX ORDER — FERROUS SULFATE, DRIED 160(50) MG
1 TABLET, EXTENDED RELEASE ORAL 2 TIMES DAILY
COMMUNITY

## 2024-11-07 RX ADMIN — LITHIUM CARBONATE 300 MG: 300 TABLET, EXTENDED RELEASE ORAL at 20:33

## 2024-11-07 RX ADMIN — LIDOCAINE 4% 1 PATCH: 40 PATCH TOPICAL at 17:58

## 2024-11-07 RX ADMIN — ACETAMINOPHEN 325MG 650 MG: 325 TABLET ORAL at 20:38

## 2024-11-07 RX ADMIN — TRIHEXYPHENIDYL HYDROCHLORIDE 2 MG: 2 TABLET ORAL at 18:48

## 2024-11-07 RX ADMIN — TRAZODONE HYDROCHLORIDE 50 MG: 50 TABLET ORAL at 20:33

## 2024-11-07 RX ADMIN — ACETAMINOPHEN 975 MG: 325 TABLET, FILM COATED ORAL at 11:25

## 2024-11-07 RX ADMIN — POLYETHYLENE GLYCOL 3350 17 G: 17 POWDER, FOR SOLUTION ORAL at 18:04

## 2024-11-07 RX ADMIN — KETOROLAC TROMETHAMINE 15 MG: 30 INJECTION, SOLUTION INTRAMUSCULAR at 13:50

## 2024-11-07 RX ADMIN — TAMSULOSIN HYDROCHLORIDE 0.4 MG: 0.4 CAPSULE ORAL at 17:58

## 2024-11-07 RX ADMIN — QUETIAPINE FUMARATE 25 MG: 25 TABLET ORAL at 20:33

## 2024-11-07 SDOH — SOCIAL STABILITY: SOCIAL INSECURITY: HAVE YOU HAD ANY THOUGHTS OF HARMING ANYONE ELSE?: NO

## 2024-11-07 SDOH — SOCIAL STABILITY: SOCIAL INSECURITY
WITHIN THE LAST YEAR, HAVE YOU BEEN RAPED OR FORCED TO HAVE ANY KIND OF SEXUAL ACTIVITY BY YOUR PARTNER OR EX-PARTNER?: NO

## 2024-11-07 SDOH — ECONOMIC STABILITY: FOOD INSECURITY
HOW HARD IS IT FOR YOU TO PAY FOR THE VERY BASICS LIKE FOOD, HOUSING, MEDICAL CARE, AND HEATING?: PATIENT UNABLE TO ANSWER

## 2024-11-07 SDOH — ECONOMIC STABILITY: HOUSING INSECURITY
IN THE LAST 12 MONTHS, WAS THERE A TIME WHEN YOU WERE NOT ABLE TO PAY THE MORTGAGE OR RENT ON TIME?: PATIENT UNABLE TO ANSWER

## 2024-11-07 SDOH — ECONOMIC STABILITY: INCOME INSECURITY: IN THE PAST 12 MONTHS HAS THE ELECTRIC, GAS, OIL, OR WATER COMPANY THREATENED TO SHUT OFF SERVICES IN YOUR HOME?: NO

## 2024-11-07 SDOH — SOCIAL STABILITY: SOCIAL INSECURITY: DO YOU FEEL ANYONE HAS EXPLOITED OR TAKEN ADVANTAGE OF YOU FINANCIALLY OR OF YOUR PERSONAL PROPERTY?: NO

## 2024-11-07 SDOH — ECONOMIC STABILITY: FOOD INSECURITY
WITHIN THE PAST 12 MONTHS, THE FOOD YOU BOUGHT JUST DIDN'T LAST AND YOU DIDN'T HAVE MONEY TO GET MORE.: PATIENT UNABLE TO ANSWER

## 2024-11-07 SDOH — SOCIAL STABILITY: SOCIAL INSECURITY: WITHIN THE LAST YEAR, HAVE YOU BEEN AFRAID OF YOUR PARTNER OR EX-PARTNER?: NO

## 2024-11-07 SDOH — SOCIAL STABILITY: SOCIAL INSECURITY: ARE THERE ANY APPARENT SIGNS OF INJURIES/BEHAVIORS THAT COULD BE RELATED TO ABUSE/NEGLECT?: NO

## 2024-11-07 SDOH — HEALTH STABILITY: PHYSICAL HEALTH: ON AVERAGE, HOW MANY MINUTES DO YOU ENGAGE IN EXERCISE AT THIS LEVEL?: 0 MIN

## 2024-11-07 SDOH — ECONOMIC STABILITY: TRANSPORTATION INSECURITY
IN THE PAST 12 MONTHS, HAS LACK OF TRANSPORTATION KEPT YOU FROM MEDICAL APPOINTMENTS OR FROM GETTING MEDICATIONS?: PATIENT UNABLE TO ANSWER

## 2024-11-07 SDOH — SOCIAL STABILITY: SOCIAL INSECURITY: HAVE YOU HAD THOUGHTS OF HARMING ANYONE ELSE?: NO

## 2024-11-07 SDOH — SOCIAL STABILITY: SOCIAL INSECURITY: WERE YOU ABLE TO COMPLETE ALL THE BEHAVIORAL HEALTH SCREENINGS?: YES

## 2024-11-07 SDOH — HEALTH STABILITY: PHYSICAL HEALTH: ON AVERAGE, HOW MANY DAYS PER WEEK DO YOU ENGAGE IN MODERATE TO STRENUOUS EXERCISE (LIKE A BRISK WALK)?: 0 DAYS

## 2024-11-07 SDOH — ECONOMIC STABILITY: HOUSING INSECURITY: AT ANY TIME IN THE PAST 12 MONTHS, WERE YOU HOMELESS OR LIVING IN A SHELTER (INCLUDING NOW)?: PATIENT UNABLE TO ANSWER

## 2024-11-07 SDOH — SOCIAL STABILITY: SOCIAL INSECURITY: DOES ANYONE TRY TO KEEP YOU FROM HAVING/CONTACTING OTHER FRIENDS OR DOING THINGS OUTSIDE YOUR HOME?: NO

## 2024-11-07 SDOH — SOCIAL STABILITY: SOCIAL INSECURITY
WITHIN THE LAST YEAR, HAVE YOU BEEN KICKED, HIT, SLAPPED, OR OTHERWISE PHYSICALLY HURT BY YOUR PARTNER OR EX-PARTNER?: NO

## 2024-11-07 SDOH — SOCIAL STABILITY: SOCIAL INSECURITY: ABUSE: ADULT

## 2024-11-07 SDOH — ECONOMIC STABILITY: FOOD INSECURITY
WITHIN THE PAST 12 MONTHS, YOU WORRIED THAT YOUR FOOD WOULD RUN OUT BEFORE YOU GOT THE MONEY TO BUY MORE.: PATIENT UNABLE TO ANSWER

## 2024-11-07 SDOH — SOCIAL STABILITY: SOCIAL INSECURITY: WITHIN THE LAST YEAR, HAVE YOU BEEN HUMILIATED OR EMOTIONALLY ABUSED IN OTHER WAYS BY YOUR PARTNER OR EX-PARTNER?: NO

## 2024-11-07 SDOH — SOCIAL STABILITY: SOCIAL INSECURITY: HAS ANYONE EVER THREATENED TO HURT YOUR FAMILY OR YOUR PETS?: NO

## 2024-11-07 SDOH — SOCIAL STABILITY: SOCIAL INSECURITY: DO YOU FEEL UNSAFE GOING BACK TO THE PLACE WHERE YOU ARE LIVING?: NO

## 2024-11-07 SDOH — SOCIAL STABILITY: SOCIAL INSECURITY: ARE YOU OR HAVE YOU BEEN THREATENED OR ABUSED PHYSICALLY, EMOTIONALLY, OR SEXUALLY BY ANYONE?: NO

## 2024-11-07 ASSESSMENT — PAIN DESCRIPTION - LOCATION
LOCATION: BACK
LOCATION: BUTTOCKS

## 2024-11-07 ASSESSMENT — COLUMBIA-SUICIDE SEVERITY RATING SCALE - C-SSRS
6. HAVE YOU EVER DONE ANYTHING, STARTED TO DO ANYTHING, OR PREPARED TO DO ANYTHING TO END YOUR LIFE?: NO
2. HAVE YOU ACTUALLY HAD ANY THOUGHTS OF KILLING YOURSELF?: NO
1. IN THE PAST MONTH, HAVE YOU WISHED YOU WERE DEAD OR WISHED YOU COULD GO TO SLEEP AND NOT WAKE UP?: NO

## 2024-11-07 ASSESSMENT — COGNITIVE AND FUNCTIONAL STATUS - GENERAL
WALKING IN HOSPITAL ROOM: A LITTLE
MOVING FROM LYING ON BACK TO SITTING ON SIDE OF FLAT BED WITH BEDRAILS: A LITTLE
TURNING FROM BACK TO SIDE WHILE IN FLAT BAD: A LITTLE
MOVING TO AND FROM BED TO CHAIR: A LITTLE
STANDING UP FROM CHAIR USING ARMS: A LITTLE
DRESSING REGULAR LOWER BODY CLOTHING: A LITTLE
CLIMB 3 TO 5 STEPS WITH RAILING: A LITTLE
MOVING FROM LYING ON BACK TO SITTING ON SIDE OF FLAT BED WITH BEDRAILS: A LITTLE
DAILY ACTIVITIY SCORE: 19
DRESSING REGULAR UPPER BODY CLOTHING: A LITTLE
DRESSING REGULAR LOWER BODY CLOTHING: A LITTLE
WALKING IN HOSPITAL ROOM: A LITTLE
TOILETING: A LITTLE
HELP NEEDED FOR BATHING: A LITTLE
DRESSING REGULAR UPPER BODY CLOTHING: A LITTLE
PERSONAL GROOMING: A LITTLE
TURNING FROM BACK TO SIDE WHILE IN FLAT BAD: A LITTLE
MOBILITY SCORE: 18
TOILETING: A LITTLE
DAILY ACTIVITIY SCORE: 19
CLIMB 3 TO 5 STEPS WITH RAILING: A LITTLE
MOBILITY SCORE: 18
PATIENT BASELINE BEDBOUND: NO
STANDING UP FROM CHAIR USING ARMS: A LITTLE
PERSONAL GROOMING: A LITTLE
HELP NEEDED FOR BATHING: A LITTLE
MOVING TO AND FROM BED TO CHAIR: A LITTLE

## 2024-11-07 ASSESSMENT — ACTIVITIES OF DAILY LIVING (ADL)
ADEQUATE_TO_COMPLETE_ADL: YES
WALKS IN HOME: INDEPENDENT
LACK_OF_TRANSPORTATION: PATIENT UNABLE TO ANSWER
BATHING: NEEDS ASSISTANCE
FEEDING YOURSELF: NEEDS ASSISTANCE
LACK_OF_TRANSPORTATION: NO
GROOMING: INDEPENDENT
HEARING - RIGHT EAR: FUNCTIONAL
HEARING - LEFT EAR: FUNCTIONAL
PATIENT'S MEMORY ADEQUATE TO SAFELY COMPLETE DAILY ACTIVITIES?: YES
TOILETING: NEEDS ASSISTANCE
DRESSING YOURSELF: INDEPENDENT
JUDGMENT_ADEQUATE_SAFELY_COMPLETE_DAILY_ACTIVITIES: YES

## 2024-11-07 ASSESSMENT — LIFESTYLE VARIABLES
AUDIT-C TOTAL SCORE: 0
AUDIT-C TOTAL SCORE: 0
HOW OFTEN DO YOU HAVE A DRINK CONTAINING ALCOHOL: NEVER
HOW MANY STANDARD DRINKS CONTAINING ALCOHOL DO YOU HAVE ON A TYPICAL DAY: PATIENT DOES NOT DRINK
SKIP TO QUESTIONS 9-10: 1
HOW OFTEN DO YOU HAVE 6 OR MORE DRINKS ON ONE OCCASION: NEVER

## 2024-11-07 ASSESSMENT — PAIN SCALES - GENERAL
PAINLEVEL_OUTOF10: 0 - NO PAIN
PAINLEVEL_OUTOF10: 8
PAINLEVEL_OUTOF10: 5 - MODERATE PAIN
PAINLEVEL_OUTOF10: 5 - MODERATE PAIN
PAINLEVEL_OUTOF10: 9

## 2024-11-07 ASSESSMENT — PATIENT HEALTH QUESTIONNAIRE - PHQ9
2. FEELING DOWN, DEPRESSED OR HOPELESS: NOT AT ALL
SUM OF ALL RESPONSES TO PHQ9 QUESTIONS 1 & 2: 0
1. LITTLE INTEREST OR PLEASURE IN DOING THINGS: NOT AT ALL

## 2024-11-07 ASSESSMENT — PAIN - FUNCTIONAL ASSESSMENT
PAIN_FUNCTIONAL_ASSESSMENT: 0-10
PAIN_FUNCTIONAL_ASSESSMENT: 0-10

## 2024-11-07 NOTE — PROGRESS NOTES
Transitional Care Coordination Progress Note:  Plan per Medical/Surgical team: treatment of fall with hip pain (no fracture)  Status: Observation  Payor source: [facility] complete mcare  Discharge disposition: Home alone   Discussed home health care options. Patient provided disclosure statement and agency listing. Patient has no preference. Dr Trinh Hutchinson is PCP. Patient agreeable to [facility] .    Potential Barriers: pain  ADOD: 11/8/22024   L Tayler Greene RN, BSN Transitional Care Coordinator ED# [phone]     When Patient is medically ready for discharge  They are being discharged to: home   Daughter will pick patient up  No SNF  New HHC: [facility]    No DME- has cane  No O2  No Wounds     11/07/24 1344   Discharge Planning   Living Arrangements Alone   Support Systems Children   Assistance Needed pain managment   Type of Residence Private residence   Number of Stairs to Enter Residence 0   Number of Stairs Within Residence 0   Do you have animals or pets at home? No   Home or Post Acute Services In home services   Type of Home Care Services Home nursing visits;Home PT;Home OT   Expected Discharge Disposition Home H   Does the patient need discharge transport arranged? Yes   RoundTrip coordination needed? Yes   Has discharge transport been arranged? No   Financial Resource Strain   How hard is it for you to pay for the very basics like food, housing, medical care, and heating? Not hard   Housing Stability   In the last 12 months, was there a time when you were not able to pay the mortgage or rent on time? N   In the past 12 months, how many times have you moved where you were living? 1   At any time in the past 12 months, were you homeless or living in a shelter (including now)? N   Transportation Needs   In the past 12 months, has lack of transportation kept you from medical appointments or from getting medications? no   In the past 12 months, has lack of transportation kept you from meetings, work, or from  getting things needed for daily living? No

## 2024-11-07 NOTE — PROGRESS NOTES
Pharmacy Medication History Review    Brannon Engel is a 69 y.o. male admitted for Falls frequently. Pharmacy reviewed the patient's [id number] medications and allergies for accuracy.    The list below reflectives the updated PTA list. Please review each medication in order reconciliation for additional clarification and justification.  Prior to Admission Medications   Prescriptions Last Dose Informant   DULoxetine (Cymbalta) 60 mg DR capsule Unknown Other   Sig: Take 1 capsule (60 mg) by mouth once daily. Do not crush or chew.   QUEtiapine (SEROquel) 25 mg tablet Unknown Other   Sig: Take 1 tablet daily at bedtimes   acetaminophen (Tylenol) 500 mg tablet Unknown Other   Sig: Take 2 tablets (1,000 mg) by mouth every 8 hours if needed for mild pain (1 - 3).   aspirin 81 mg EC tablet Unknown Other   Sig: Take 1 tablet (81 mg) by mouth once daily.   atorvastatin (Lipitor) 80 mg tablet Unknown Other   Sig: Take 1 tablet (80 mg) by mouth once daily.   calcium carbonate-vitamin D3 500 mg-5 mcg (200 unit) tablet Unknown Other   Sig: Take 1 tablet by mouth 2 times a day.   cholecalciferol (Vitamin D3) 25 MCG (1000 UT) tablet Unknown Other   Sig: Take 1 tablet (1,000 Units) by mouth once daily.   cyanocobalamin (Vitamin B-12) 1,000 mcg tablet Unknown Other   Sig: Take 1 tablet (1,000 mcg) by mouth once daily.   diclofenac sodium (Voltaren Arthritis Pain) 1 % gel Unknown Other   Sig: Apply 4.5 inches (4 g) topically every 2 hours if needed.   docusate sodium (Colace) 100 mg capsule Unknown Other   Sig: Take 1 capsule (100 mg) by mouth 2 times a day.   esomeprazole (NexIUM) 40 mg DR capsule Unknown Other   Sig: Take 1 capsule (40 mg) by mouth 2 times a day. Do not open capsule.   ferrous sulfate 325 (65 Fe) MG EC tablet Unknown Other   Sig: Take 65 mg by mouth once daily. M,W,F  Do not crush, chew, or split.   fluticasone (Flonase) 50 mcg/actuation nasal spray Unknown Other   Sig: Administer 1 spray into each nostril  once daily. Shake gently. Before first use, prime pump. After use, clean tip and replace cap.   glucose (Glutose) 40 % gel oral gel Unknown Other   Sig: Take 15 g by mouth if needed for low blood sugar - see comments.   hydrocortisone (Anusol-HC) 25 mg suppository Unknown Other   Sig: Insert 1 suppository (25 mg) into the rectum 2 times a day as needed for hemorrhoids.   ibuprofen 200 mg tablet Unknown Other   Sig: Take 1 tablet (200 mg) by mouth every 6 hours if needed for mild pain (1 - 3).   insulin glargine (Lantus U-100 Insulin) 100 unit/mL injection Unknown Other   Sig: Inject 44 Units under the skin once daily at bedtime. Take as directed per insulin instructions.   insulin lispro (HumaLOG KwikPen Insulin) 200 unit/mL (3 mL) insulin pen pen Unknown Other   Sig: Inject 13 Units under the skin.   Inject 13 Units under the skin 3 times daily (with meals). if blood sugar is over 80   ipratropium-albuteroL (Duo-Neb) 0.5-2.5 mg/3 mL nebulizer solution Unknown Other   Sig: Take 3 mL by nebulization every 6 hours.   isosorbide mononitrate ER (Imdur) 60 mg 24 hr tablet Unknown Other   Sig: Take 1 tablet (60 mg) by mouth once daily. Do not crush or chew.   lithium ER (Lithobid) 300 mg 12 hr tablet Unknown Other   Sig: Take 1 tablet (300 mg) by mouth twice a day.   magnesium hydroxide (Milk of Magnesia) 400 mg/5 mL suspension Unknown Other   Sig: Take 30 mL by mouth once daily as needed for constipation.   multivitamin tablet Unknown Other   Sig: Take 1 tablet by mouth once daily.   nitroglycerin (Nitrostat) 0.4 mg SL tablet Unknown Other   Sig: Place 1 tablet (0.4 mg) under the tongue every 5 minutes if needed for chest pain.   oxyCODONE (Oxy-IR) 5 mg immediate release capsule Unknown Other   Sig: Take 1 capsule (5 mg) by mouth every 6 hours if needed for severe pain (7 - 10).   oxybutynin XL (Ditropan-XL) 5 mg 24 hr tablet Unknown Other   Sig: Take 3 tablets (15 mg) by mouth once daily. Do not crush, chew, or split.    pregabalin (Lyrica) 200 mg capsule Unknown Other   Sig: Take 1 capsule (200 mg) by mouth 2 times a day.   semaglutide (Ozempic) 0.25 mg or 0.5 mg(2 mg/1.5 mL) pen injector Unknown Other   Sig: Inject 0.25 mg under the skin 1 (one) time per week. saturday   tamsulosin (Flomax) 0.4 mg 24 hr capsule Unknown Other   Sig: Take 1 capsule (0.4 mg) by mouth once daily.   traZODone (Desyrel) 50 mg tablet Unknown Other   Sig: Take 2.5 tablets (125 mg) by mouth once daily at bedtime.   triamcinolone (Kenalog) 0.1 % cream Unknown Other   Sig: Apply topically twice a day. Monday - friday   trihexyphenidyl (Artane) 2 mg tablet Unknown Other   Sig: Take 1 tablet (2 mg) by mouth twice a day.   varenicline (Chantix) 1 mg tablet Unknown Other   Sig: Take 1 tablet (1 mg) by mouth 2 times a day. Take with full glass of water.   zolpidem (Ambien) 5 mg tablet Unknown Other   Sig: Take 1 tablet (5 mg) by mouth.      Facility-Administered Medications: None         The list below reflectives the updated allergy list. Please review each documented allergy for additional clarification and justification.  Allergies  Reviewed by Buffy Cartagena RN on 11/7/2024        Severity Reactions Comments    Ace Inhibitors Not Specified Swelling Other reaction(s): Edema   Tongue swelling 2017   Tongue swells   Other reaction(s): Edema   Tongue swelling 2017   Tongue swells   Tongue swelling 2017            Below are additional concerns with the patient's PTA list.  The following updates were made to the Prior to Admission medication list:     Source of Information:     Medications ADDED:   N/a  Medications CHANGED:  N/a  Medications REMOVED:   N/a  Medications NOT TAKING:   N/a    Allergy reviewed : Yes    Meds 2 Beds : No    Comments: med list from facility      Bolivar Adkins

## 2024-11-07 NOTE — ED TRIAGE NOTES
Pt to ed from assisted living facility for fall. Pt was found down, unsure of how long he was down or how he fell. Pt states he has 9/10 hip pain. Denies any dizziness before falling. Uses a walker

## 2024-11-07 NOTE — PROGRESS NOTES
11/07/24 1346   [facility] Disability Status   Are you deaf or do you have serious difficulty hearing? N   Are you blind or do you have serious difficulty seeing, even when wearing glasses? N   Because of a physical, mental, or emotional condition, do you have serious difficulty concentrating, remembering, or making decisions? (5 years old or older) Y  (schizophrenia)   Do you have serious difficulty walking or climbing stairs? Y  (cane, fall, hip pain)   Do you have serious difficulty dressing or bathing? N   Because of a physical, mental, or emotional condition, do you have serious difficulty doing errands alone such as visiting the doctor? Y  (daughter drives)

## 2024-11-07 NOTE — CARE PLAN
The patient's goals for the shift include      The clinical goals for the shift include free from fall and injury; manage pain

## 2024-11-07 NOTE — PROGRESS NOTES
Plan per Medical/Surgical team: treatment of fall with hip pain (no fracture)  Status: Observation  Payor source: [facility] complete mcare    Discharge disposition: [address] [place] [address]    [phone]- if they answer     Discussed home health care options. Patient provided disclosure statement and agency listing. Patient has no preference. Dr Trinh Hutchinson is PCP. Patient agreeable to [facility] .      Potential Barriers: pain  ADOD: 11/8/22024   L Tayler Greene RN, BSN Transitional Care Coordinator ED# [phone]     Patient is medically ready for discharge  [facility] [place] [facility] [place]  [facility]  No SNF  [place]-                            Facility: [address] [place] [address]               Report number:  [phone]- if they answer   [facility]  Will need transported  DME- cane @ [facility]  No O2  No Wounds     11/07/24 1344   Discharge Planning   Living Arrangements Alone   Support Systems Children   Assistance Needed pain management   Type of Residence Assisted living   Number of Stairs to Enter Residence 0   Number of Stairs Within Residence 0   Do you have animals or pets at home? No   Care Facility Name [address] [place] [address]    [phone]   Home or Post Acute Services In home services;Post acute facilities (Rehab/SNF/etc)   Type of Post Acute Facility Services Assisted living   Type of Home Care Services Home nursing visits;Home PT;Home OT   Expected Discharge Disposition Home H   Does the patient need discharge transport arranged? Yes   RoundTrip coordination needed? Yes   Has discharge transport been arranged? No   Financial Resource Strain   How hard is it for you to pay for the very basics like food, housing, medical care, and heating? Not hard   Housing Stability   In the last 12 months, was there a time when you were not able to pay the mortgage or rent on time? N   In the past 12 months,  how many times have you moved where you were living? 1   At any time in the past 12 months, were you homeless or living in a shelter (including now)? N   Transportation Needs   In the past 12 months, has lack of transportation kept you from medical appointments or from getting medications? no   In the past 12 months, has lack of transportation kept you from meetings, work, or from getting things needed for daily living? No

## 2024-11-07 NOTE — ED PROVIDER NOTES
Limitations to History: None  Additional History Obtained from: None    HPI:    Pt is a 68 y/o M with a PMH of empysema, GERD, HLD, HTN, PVD, DM2 and schizophrenia presenting to the ED after a fall.  Patient states that he was walking backwards, not using his cane when he fell landing on his right hip.  He states he is on that he struck his head.  He was unable to get up on his own.  He states staff members at his facility helped him get up but he was unable to ambulate after the fall secondary to pain in his right hip.  Denies any dizziness or lightheadedness prior to or after the fall.  States he has pain over his bilateral hips right greater than left.  Denies any chest pain, difficulty breathing, numbness or tingling.  His review of systems is otherwise negative.    ------------------------------------------------------------------------------------------------------------------------------------------  Physical Exam:    Vitals:    11/07/24 1125   BP: 110/73   Pulse: 93   Resp: 15   Temp: 36.6 °C (97.9 °F)   SpO2: 97%          VS: As documented in the triage note and EMR flowsheet from this visit were reviewed.  General: Well appearing. No acute distress.   Eyes: Pupils round and reactive. No scleral icterus. No conjunctival injection  HENT: Atraumatic. Normocephalic. Moist mucous membranes. Trachea midline  CV: RRR, No MRG. No pedal edema appreciated.  Resp: Clear to auscultation bilaterally. Non-labored.    GI: Soft, nontender to palpation. Nondistended. No guarding, rigidity or rebound  Skin: Warm, dry, intact. No systemic rashes or lesions appreciated.  Extremities: No bony deformities noted, tenderness to palpation over bilateral lateral hips right greater than left, mild paraspinal C-spine tenderness to palpation no significant midline C-spine tenderness to palpation no T or L-spine tenderness to palpation   neuro: Alert. No focal motor or sensory deficits observed. Speech fluent. Answers questions  appropriately.   Psych: Appropriate. Kempt.    ------------------------------------------------------------------------------------------------------------------------------------------    Medical Decision Making  Patient is presenting to the emergency department after a fall.  Patient is describing the fall as mechanical in nature.  He has a nonfocal neurologic with no bony deformities.  Reema to evaluate for any injuries, provide pain control and reevaluate.  As the patient's fall was mechanical in nature he has a nonfocal neurologic exam if his imaging is negative anticipate discharge home      External Records Reviewed: I reviewed recent and relevant outside records including: [facility]-prior ED note from 9/2024  Escalation of Care: Appropriate for Observation  Social Determinants Affecting Care:Multiple chronic illnesses  Prescription Drug Consideration: per orders  Diagnostic testing considered: per orders  Discussion of Management with Other Providers: I discussed the patient/results with: Rj    Objective Data  I have independently interpreted the following labs, imaging studies and MDM added to ED Course  Labs Reviewed   CBC WITH AUTO DIFFERENTIAL   COMPREHENSIVE METABOLIC PANEL       XR hip right with pelvis when performed 2 or 3 views   Final Result   Intact total left hip arthroplasty. Degenerative changes of the right   hip joint. No acute fracture or subluxation.        Signed by: Brendan Taylor 11/7/2024 11:42 AM   Dictation workstation:   [id number]      CT head wo IV contrast   Final Result   No evidence of acute cortical infarct or intracranial hemorrhage.        Chronic changes as described.        MACRO:   None        Signed by: Dagoberto Trejo 11/7/2024 11:14 AM   Dictation workstation:   [id number]      CT cervical spine wo IV contrast   Final Result   No evidence for an acute fracture or subluxation of the cervical   spine.        MACRO:   None        Signed by: Dagoberto Trejo  11/7/2024 11:34 AM   Dictation workstation:   [id number]          ED Course  ED Course as of 11/07/24 1350   Thu Nov 07, 2024   1158 Imaging negative for acute fractures/bleeds/dislocations. Plan ambulatory trial for disposition [LP]   1349 Patient unable to ambulate safely, will obtain basic blood work, patient was discussed with the medical service for observation for PT OT as this is far all from the patient's baseline ambulatory function [LP]      ED Course User Index  [LP] Wendy Russell DO         Diagnoses as of 11/07/24 1350   Fall, initial encounter   Pain of right hip       Procedure  Procedures    Disposition: obs    Wendy Russell DO  Emergency Medicine  Medical Toxicology     Wendy Russell DO  11/07/24 1350

## 2024-11-08 ENCOUNTER — APPOINTMENT (OUTPATIENT)
Dept: RADIOLOGY | Facility: HOSPITAL | Age: 69
DRG: 699 | End: 2024-11-08
Payer: COMMERCIAL
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
## 2024-11-08 LAB
ANION GAP SERPL CALC-SCNC: 14 MMOL/L (ref 10–20)
APPEARANCE UR: CLEAR
BACTERIA #/AREA URNS AUTO: ABNORMAL /HPF
BILIRUB UR STRIP.AUTO-MCNC: NEGATIVE MG/DL
BUN SERPL-MCNC: 12 MG/DL (ref 6–23)
CALCIUM SERPL-MCNC: 9.7 MG/DL (ref 8.6–10.3)
CHLORIDE SERPL-SCNC: 98 MMOL/L (ref 98–107)
CHLORIDE UR-SCNC: <15 MMOL/L
CHLORIDE/CREATININE (MMOL/G) IN URINE: NORMAL
CO2 SERPL-SCNC: 19 MMOL/L (ref 21–32)
COLOR UR: YELLOW
CREAT SERPL-MCNC: 1.14 MG/DL (ref 0.5–1.3)
CREAT UR-MCNC: 215.1 MG/DL (ref 20–370)
EGFRCR SERPLBLD CKD-EPI 2021: 70 ML/MIN/1.73M*2
ERYTHROCYTE [DISTWIDTH] IN BLOOD BY AUTOMATED COUNT: 13.3 % (ref 11.5–14.5)
GLUCOSE BLD MANUAL STRIP-MCNC: 105 MG/DL (ref 74–99)
GLUCOSE BLD MANUAL STRIP-MCNC: 114 MG/DL (ref 74–99)
GLUCOSE BLD MANUAL STRIP-MCNC: 116 MG/DL (ref 74–99)
GLUCOSE BLD MANUAL STRIP-MCNC: 143 MG/DL (ref 74–99)
GLUCOSE SERPL-MCNC: 127 MG/DL (ref 74–99)
GLUCOSE UR STRIP.AUTO-MCNC: NORMAL MG/DL
HCT VFR BLD AUTO: 42.8 % (ref 41–52)
HGB BLD-MCNC: 14 G/DL (ref 13.5–17.5)
HYALINE CASTS #/AREA URNS AUTO: ABNORMAL /LPF
KETONES UR STRIP.AUTO-MCNC: NEGATIVE MG/DL
LEUKOCYTE ESTERASE UR QL STRIP.AUTO: ABNORMAL
MCH RBC QN AUTO: 30.2 PG (ref 26–34)
MCHC RBC AUTO-ENTMCNC: 32.7 G/DL (ref 32–36)
MCV RBC AUTO: 92 FL (ref 80–100)
MUCOUS THREADS #/AREA URNS AUTO: ABNORMAL /LPF
NITRITE UR QL STRIP.AUTO: NEGATIVE
NRBC BLD-RTO: 0 /100 WBCS (ref 0–0)
OSMOLALITY SERPL: 275 MOSM/KG (ref 280–300)
PH UR STRIP.AUTO: 6.5 [PH]
PLATELET # BLD AUTO: 265 X10*3/UL (ref 150–450)
POTASSIUM SERPL-SCNC: 3.5 MMOL/L (ref 3.5–5.3)
POTASSIUM UR-SCNC: 42 MMOL/L
POTASSIUM/CREAT UR-RTO: 20 MMOL/G CREAT
PROT UR STRIP.AUTO-MCNC: ABNORMAL MG/DL
RBC # BLD AUTO: 4.64 X10*6/UL (ref 4.5–5.9)
RBC # UR STRIP.AUTO: NEGATIVE /UL
RBC #/AREA URNS AUTO: ABNORMAL /HPF
SODIUM SERPL-SCNC: 127 MMOL/L (ref 136–145)
SODIUM UR-SCNC: 14 MMOL/L
SODIUM/CREAT UR-RTO: 7 MMOL/G CREAT
SP GR UR STRIP.AUTO: 1.02
TSH SERPL-ACNC: 1.08 MIU/L (ref 0.44–3.98)
URATE SERPL-MCNC: 6.8 MG/DL (ref 4–7.5)
UROBILINOGEN UR STRIP.AUTO-MCNC: NORMAL MG/DL
WBC # BLD AUTO: 11.5 X10*3/UL (ref 4.4–11.3)
WBC #/AREA URNS AUTO: ABNORMAL /HPF
YEAST BUDDING #/AREA UR COMP ASSIST: PRESENT /HPF

## 2024-11-08 PROCEDURE — 72192 CT PELVIS W/O DYE: CPT

## 2024-11-08 PROCEDURE — 83935 ASSAY OF URINE OSMOLALITY: CPT | Mod: AHULAB | Performed by: FAMILY MEDICINE

## 2024-11-08 PROCEDURE — G0378 HOSPITAL OBSERVATION PER HR: HCPCS

## 2024-11-08 PROCEDURE — 84550 ASSAY OF BLOOD/URIC ACID: CPT | Performed by: INTERNAL MEDICINE

## 2024-11-08 PROCEDURE — 1100000001 HC PRIVATE ROOM DAILY

## 2024-11-08 PROCEDURE — 81001 URINALYSIS AUTO W/SCOPE: CPT | Performed by: FAMILY MEDICINE

## 2024-11-08 PROCEDURE — 87086 URINE CULTURE/COLONY COUNT: CPT | Mod: AHULAB | Performed by: FAMILY MEDICINE

## 2024-11-08 PROCEDURE — 84443 ASSAY THYROID STIM HORMONE: CPT | Performed by: INTERNAL MEDICINE

## 2024-11-08 PROCEDURE — 2500000002 HC RX 250 W HCPCS SELF ADMINISTERED DRUGS (ALT 637 FOR MEDICARE OP, ALT 636 FOR OP/ED): Performed by: FAMILY MEDICINE

## 2024-11-08 PROCEDURE — 83930 ASSAY OF BLOOD OSMOLALITY: CPT | Mod: AHULAB | Performed by: FAMILY MEDICINE

## 2024-11-08 PROCEDURE — 82436 ASSAY OF URINE CHLORIDE: CPT | Performed by: FAMILY MEDICINE

## 2024-11-08 PROCEDURE — 2500000005 HC RX 250 GENERAL PHARMACY W/O HCPCS: Performed by: NURSE PRACTITIONER

## 2024-11-08 PROCEDURE — 82947 ASSAY GLUCOSE BLOOD QUANT: CPT

## 2024-11-08 PROCEDURE — 72192 CT PELVIS W/O DYE: CPT | Performed by: RADIOLOGY

## 2024-11-08 PROCEDURE — 97161 PT EVAL LOW COMPLEX 20 MIN: CPT | Mod: GP

## 2024-11-08 PROCEDURE — 2500000001 HC RX 250 WO HCPCS SELF ADMINISTERED DRUGS (ALT 637 FOR MEDICARE OP): Performed by: FAMILY MEDICINE

## 2024-11-08 PROCEDURE — 2500000002 HC RX 250 W HCPCS SELF ADMINISTERED DRUGS (ALT 637 FOR MEDICARE OP, ALT 636 FOR OP/ED): Performed by: NURSE PRACTITIONER

## 2024-11-08 PROCEDURE — 2500000004 HC RX 250 GENERAL PHARMACY W/ HCPCS (ALT 636 FOR OP/ED): Performed by: INTERNAL MEDICINE

## 2024-11-08 PROCEDURE — 82570 ASSAY OF URINE CREATININE: CPT | Performed by: FAMILY MEDICINE

## 2024-11-08 PROCEDURE — 80048 BASIC METABOLIC PNL TOTAL CA: CPT | Performed by: NURSE PRACTITIONER

## 2024-11-08 PROCEDURE — 97165 OT EVAL LOW COMPLEX 30 MIN: CPT | Mod: GO | Performed by: OCCUPATIONAL THERAPIST

## 2024-11-08 PROCEDURE — 85027 COMPLETE CBC AUTOMATED: CPT | Performed by: NURSE PRACTITIONER

## 2024-11-08 PROCEDURE — 36415 COLL VENOUS BLD VENIPUNCTURE: CPT | Performed by: NURSE PRACTITIONER

## 2024-11-08 PROCEDURE — 2500000004 HC RX 250 GENERAL PHARMACY W/ HCPCS (ALT 636 FOR OP/ED): Performed by: NURSE PRACTITIONER

## 2024-11-08 PROCEDURE — 99222 1ST HOSP IP/OBS MODERATE 55: CPT | Performed by: INTERNAL MEDICINE

## 2024-11-08 PROCEDURE — 2500000001 HC RX 250 WO HCPCS SELF ADMINISTERED DRUGS (ALT 637 FOR MEDICARE OP): Performed by: NURSE PRACTITIONER

## 2024-11-08 RX ORDER — ASPIRIN 81 MG/1
81 TABLET ORAL DAILY
Status: DISCONTINUED | OUTPATIENT
Start: 2024-11-08 | End: 2024-11-20 | Stop reason: HOSPADM

## 2024-11-08 RX ORDER — KETOROLAC TROMETHAMINE 30 MG/ML
15 INJECTION, SOLUTION INTRAMUSCULAR; INTRAVENOUS ONCE
Status: COMPLETED | OUTPATIENT
Start: 2024-11-08 | End: 2024-11-08

## 2024-11-08 RX ORDER — LANOLIN ALCOHOL/MO/W.PET/CERES
1000 CREAM (GRAM) TOPICAL DAILY
Status: DISCONTINUED | OUTPATIENT
Start: 2024-11-08 | End: 2024-11-20 | Stop reason: HOSPADM

## 2024-11-08 RX ORDER — CEFTRIAXONE 1 G/50ML
1 INJECTION, SOLUTION INTRAVENOUS EVERY 24 HOURS
Status: DISCONTINUED | OUTPATIENT
Start: 2024-11-08 | End: 2024-11-12

## 2024-11-08 RX ORDER — ATORVASTATIN CALCIUM 80 MG/1
80 TABLET, FILM COATED ORAL DAILY
Status: DISCONTINUED | OUTPATIENT
Start: 2024-11-08 | End: 2024-11-20 | Stop reason: HOSPADM

## 2024-11-08 RX ORDER — ISOSORBIDE MONONITRATE 30 MG/1
60 TABLET, EXTENDED RELEASE ORAL DAILY
Status: DISCONTINUED | OUTPATIENT
Start: 2024-11-08 | End: 2024-11-20 | Stop reason: HOSPADM

## 2024-11-08 RX ORDER — INSULIN LISPRO 100 [IU]/ML
6 INJECTION, SOLUTION INTRAVENOUS; SUBCUTANEOUS
Status: DISCONTINUED | OUTPATIENT
Start: 2024-11-08 | End: 2024-11-20 | Stop reason: HOSPADM

## 2024-11-08 RX ORDER — INSULIN GLARGINE 100 [IU]/ML
44 INJECTION, SOLUTION SUBCUTANEOUS NIGHTLY
Status: DISCONTINUED | OUTPATIENT
Start: 2024-11-08 | End: 2024-11-20 | Stop reason: HOSPADM

## 2024-11-08 RX ORDER — FERROUS SULFATE, DRIED 160(50) MG
1 TABLET, EXTENDED RELEASE ORAL 2 TIMES DAILY
Status: DISCONTINUED | OUTPATIENT
Start: 2024-11-08 | End: 2024-11-20 | Stop reason: HOSPADM

## 2024-11-08 RX ORDER — LIDOCAINE 560 MG/1
1 PATCH PERCUTANEOUS; TOPICAL; TRANSDERMAL DAILY
Status: DISCONTINUED | OUTPATIENT
Start: 2024-11-08 | End: 2024-11-20 | Stop reason: HOSPADM

## 2024-11-08 RX ORDER — PANTOPRAZOLE SODIUM 20 MG/1
20 TABLET, DELAYED RELEASE ORAL
Status: DISCONTINUED | OUTPATIENT
Start: 2024-11-08 | End: 2024-11-20 | Stop reason: HOSPADM

## 2024-11-08 RX ORDER — SODIUM CHLORIDE 9 MG/ML
100 INJECTION, SOLUTION INTRAVENOUS CONTINUOUS
Status: ACTIVE | OUTPATIENT
Start: 2024-11-08 | End: 2024-11-09

## 2024-11-08 RX ORDER — PREGABALIN 100 MG/1
200 CAPSULE ORAL 2 TIMES DAILY
Status: DISCONTINUED | OUTPATIENT
Start: 2024-11-08 | End: 2024-11-20 | Stop reason: HOSPADM

## 2024-11-08 RX ORDER — DULOXETIN HYDROCHLORIDE 30 MG/1
60 CAPSULE, DELAYED RELEASE ORAL DAILY
Status: DISCONTINUED | OUTPATIENT
Start: 2024-11-08 | End: 2024-11-20 | Stop reason: HOSPADM

## 2024-11-08 RX ADMIN — LITHIUM CARBONATE 300 MG: 300 TABLET, EXTENDED RELEASE ORAL at 21:52

## 2024-11-08 RX ADMIN — LIDOCAINE 4% 1 PATCH: 40 PATCH TOPICAL at 09:18

## 2024-11-08 RX ADMIN — Medication 1 TABLET: at 09:17

## 2024-11-08 RX ADMIN — PANTOPRAZOLE SODIUM 20 MG: 40 TABLET, DELAYED RELEASE ORAL at 09:17

## 2024-11-08 RX ADMIN — Medication 1000 MCG: at 09:17

## 2024-11-08 RX ADMIN — DULOXETINE HYDROCHLORIDE 60 MG: 30 CAPSULE, DELAYED RELEASE ORAL at 09:17

## 2024-11-08 RX ADMIN — SODIUM CHLORIDE 100 ML/HR: 9 INJECTION, SOLUTION INTRAVENOUS at 14:03

## 2024-11-08 RX ADMIN — ATORVASTATIN CALCIUM 80 MG: 80 TABLET, FILM COATED ORAL at 09:17

## 2024-11-08 RX ADMIN — ACETAMINOPHEN 325MG 650 MG: 325 TABLET ORAL at 17:44

## 2024-11-08 RX ADMIN — TAMSULOSIN HYDROCHLORIDE 0.4 MG: 0.4 CAPSULE ORAL at 09:17

## 2024-11-08 RX ADMIN — LITHIUM CARBONATE 300 MG: 300 TABLET, EXTENDED RELEASE ORAL at 09:18

## 2024-11-08 RX ADMIN — KETOROLAC TROMETHAMINE 15 MG: 30 INJECTION, SOLUTION INTRAMUSCULAR at 13:18

## 2024-11-08 RX ADMIN — INSULIN GLARGINE 44 UNITS: 100 INJECTION, SOLUTION SUBCUTANEOUS at 21:53

## 2024-11-08 RX ADMIN — ACETAMINOPHEN 325MG 650 MG: 325 TABLET ORAL at 11:27

## 2024-11-08 RX ADMIN — PREGABALIN 200 MG: 100 CAPSULE ORAL at 21:52

## 2024-11-08 RX ADMIN — PREGABALIN 200 MG: 100 CAPSULE ORAL at 09:17

## 2024-11-08 RX ADMIN — CEFTRIAXONE SODIUM 1 G: 1 INJECTION, SOLUTION INTRAVENOUS at 13:18

## 2024-11-08 RX ADMIN — TRIHEXYPHENIDYL HYDROCHLORIDE 2 MG: 2 TABLET ORAL at 09:17

## 2024-11-08 RX ADMIN — ISOSORBIDE MONONITRATE 60 MG: 30 TABLET, EXTENDED RELEASE ORAL at 09:17

## 2024-11-08 RX ADMIN — ENOXAPARIN SODIUM 40 MG: 40 INJECTION SUBCUTANEOUS at 16:58

## 2024-11-08 RX ADMIN — Medication 1 TABLET: at 21:53

## 2024-11-08 RX ADMIN — ASPIRIN 81 MG: 81 TABLET, COATED ORAL at 09:30

## 2024-11-08 RX ADMIN — TRAZODONE HYDROCHLORIDE 50 MG: 50 TABLET ORAL at 21:53

## 2024-11-08 RX ADMIN — TRIHEXYPHENIDYL HYDROCHLORIDE 2 MG: 2 TABLET ORAL at 16:58

## 2024-11-08 RX ADMIN — QUETIAPINE FUMARATE 25 MG: 25 TABLET ORAL at 21:53

## 2024-11-08 ASSESSMENT — COGNITIVE AND FUNCTIONAL STATUS - GENERAL
HELP NEEDED FOR BATHING: A LITTLE
DAILY ACTIVITIY SCORE: 15
MOVING TO AND FROM BED TO CHAIR: A LITTLE
TOILETING: A LITTLE
HELP NEEDED FOR BATHING: A LOT
DRESSING REGULAR UPPER BODY CLOTHING: A LITTLE
MOBILITY SCORE: 11
PERSONAL GROOMING: A LITTLE
WALKING IN HOSPITAL ROOM: A LITTLE
STANDING UP FROM CHAIR USING ARMS: A LITTLE
TOILETING: A LOT
DRESSING REGULAR UPPER BODY CLOTHING: A LITTLE
DAILY ACTIVITIY SCORE: 19
STANDING UP FROM CHAIR USING ARMS: A LOT
DRESSING REGULAR LOWER BODY CLOTHING: A LITTLE
DRESSING REGULAR LOWER BODY CLOTHING: A LOT
MOBILITY SCORE: 18
TURNING FROM BACK TO SIDE WHILE IN FLAT BAD: A LITTLE
MOVING FROM LYING ON BACK TO SITTING ON SIDE OF FLAT BED WITH BEDRAILS: A LITTLE
WALKING IN HOSPITAL ROOM: TOTAL
PERSONAL GROOMING: A LITTLE
EATING MEALS: A LITTLE
CLIMB 3 TO 5 STEPS WITH RAILING: A LITTLE
MOVING FROM LYING ON BACK TO SITTING ON SIDE OF FLAT BED WITH BEDRAILS: A LITTLE
MOVING TO AND FROM BED TO CHAIR: TOTAL
TURNING FROM BACK TO SIDE WHILE IN FLAT BAD: A LITTLE
CLIMB 3 TO 5 STEPS WITH RAILING: TOTAL

## 2024-11-08 ASSESSMENT — ACTIVITIES OF DAILY LIVING (ADL)
ADL_ASSISTANCE: INDEPENDENT
ADL_ASSISTANCE: INDEPENDENT

## 2024-11-08 ASSESSMENT — PAIN DESCRIPTION - ORIENTATION
ORIENTATION: LEFT
ORIENTATION: RIGHT
ORIENTATION: LEFT

## 2024-11-08 ASSESSMENT — PAIN - FUNCTIONAL ASSESSMENT
PAIN_FUNCTIONAL_ASSESSMENT: 0-10

## 2024-11-08 ASSESSMENT — PAIN DESCRIPTION - LOCATION
LOCATION: HIP

## 2024-11-08 ASSESSMENT — PAIN DESCRIPTION - DESCRIPTORS: DESCRIPTORS: ACHING

## 2024-11-08 ASSESSMENT — PAIN SCALES - GENERAL
PAINLEVEL_OUTOF10: 8
PAINLEVEL_OUTOF10: 0 - NO PAIN
PAINLEVEL_OUTOF10: 4
PAINLEVEL_OUTOF10: 9
PAINLEVEL_OUTOF10: 4
PAINLEVEL_OUTOF10: 0 - NO PAIN

## 2024-11-08 NOTE — CARE PLAN
The patient's goals for the shift include      The clinical goals for the shift include patient will remain free from falls and injury      Problem: Pain - Adult  Goal: Verbalizes/displays adequate comfort level or baseline comfort level  Outcome: Progressing     Problem: Safety - Adult  Goal: Free from fall injury  Outcome: Progressing     Problem: Discharge Planning  Goal: Discharge to home or other facility with appropriate resources  Outcome: Progressing     Problem: Chronic Conditions and Co-morbidities  Goal: Patient's chronic conditions and co-morbidity symptoms are monitored and maintained or improved  Outcome: Progressing     Problem: Fall/Injury  Goal: Not fall by end of shift  Outcome: Progressing  Goal: Be free from injury by end of the shift  Outcome: Progressing  Goal: Verbalize understanding of personal risk factors for fall in the hospital  Outcome: Progressing  Goal: Verbalize understanding of risk factor reduction measures to prevent injury from fall in the home  Outcome: Progressing  Goal: Use assistive devices by end of the shift  Outcome: Progressing  Goal: Pace activities to prevent fatigue by end of the shift  Outcome: Progressing     Problem: Pain  Goal: Takes deep breaths with improved pain control throughout the shift  Outcome: Progressing  Goal: Turns in bed with improved pain control throughout the shift  Outcome: Progressing  Goal: Walks with improved pain control throughout the shift  Outcome: Progressing  Goal: Performs ADL's with improved pain control throughout shift  Outcome: Progressing  Goal: Participates in PT with improved pain control throughout the shift  Outcome: Progressing  Goal: Free from opioid side effects throughout the shift  Outcome: Progressing  Goal: Free from acute confusion related to pain meds throughout the shift  Outcome: Progressing

## 2024-11-08 NOTE — CONSULTS
CONSULT: NEPHROLOGY SERVICE    REASON FOR CONSULT: Hyponatremia   Admit Date: 11/7/2024 10:30 AM       HPI: Patient is a 69 y.o. male admitted 11/7/2024 with h/o Emphysema lung, GERD, HLD, Hypertension, essential, Low back pain, PVD, Schizophrenia, and Type 2 diabetes mellitus coming after fall staying on the grown for dome time, fells some pain  Consulted because of non improving hyponatremia since admission     Past Medical History:   Diagnosis Date    Dysphagia     Emphysema lung (Multi)     GERD (gastroesophageal reflux disease)     HLD (hyperlipidemia)     Hypertension, essential     Low back pain     PVD (peripheral vascular disease) (CMS-[facility])     Schizophrenia     Type 2 diabetes mellitus      Allergies: Ace inhibitors     No past surgical history on file.    Family History   Problem Relation Name Age of Onset    No Known Problems Mother      No Known Problems Father         Social History  He reports that he has quit smoking. His smoking use included cigarettes. He does not have any smokeless tobacco history on file. No history on file for alcohol use and drug use.    Review of Systems  As above     CURRENT HOSP MEDS:    Current Facility-Administered Medications:     acetaminophen (Tylenol) tablet 650 mg, 650 mg, oral, q4h PRN, 650 mg at 11/08/24 1127 **OR** acetaminophen (Tylenol) oral liquid 650 mg, 650 mg, nasogastric tube, q4h PRN **OR** acetaminophen (Tylenol) suppository 650 mg, 650 mg, rectal, q4h PRN, DARIEN Sousa-CNP    aspirin EC tablet 81 mg, 81 mg, oral, Daily, Shiv De La Rosa MD, 81 mg at 11/08/24 0930    atorvastatin (Lipitor) tablet 80 mg, 80 mg, oral, Daily, Shiv De La Rosa MD, 80 mg at 11/08/24 0917    calcium carbonate-vitamin D3 500 mg-5 mcg (200 unit) per tablet 1 tablet, 1 tablet, oral, BID, Shiv De La Rosa MD, 1 tablet at 11/08/24 0917    cefTRIAXone (Rocephin) 1 g in dextrose (iso) IV 50 mL, 1 g, intravenous, q24h, Ricardo Matthews APRN-CNP, Stopped at 11/08/24 1352    cyanocobalamin  (Vitamin B-12) tablet 1,000 mcg, 1,000 mcg, oral, Daily, Shiv De La Rosa MD, 1,000 mcg at 11/08/24 0917    DULoxetine (Cymbalta) DR capsule 60 mg, 60 mg, oral, Daily, Shiv De La Rosa MD, 60 mg at 11/08/24 0917    enoxaparin (Lovenox) syringe 40 mg, 40 mg, subcutaneous, q24h, PARI Sousa    insulin glargine (Lantus) injection 44 Units, 44 Units, subcutaneous, Nightly, Shiv De La Rosa MD    insulin lispro injection 13 Units, 13 Units, subcutaneous, TID, PARI Sousa    isosorbide mononitrate ER (Imdur) 24 hr tablet 60 mg, 60 mg, oral, Daily, Shiv De La Rosa MD, 60 mg at 11/08/24 0917    lidocaine 4 % patch 1 patch, 1 patch, transdermal, Daily, PARI Sousa, 1 patch at 11/08/24 0918    lidocaine 4 % patch 1 patch, 1 patch, transdermal, Daily, PARI Sousa    lithium ER (Lithobid) extended release tablet 300 mg, 300 mg, oral, BID, PARI Sousa, 300 mg at 11/08/24 0918    ondansetron ODT (Zofran-ODT) disintegrating tablet 4 mg, 4 mg, oral, Once, PARI Sousa    pantoprazole (ProtoNix) EC tablet 20 mg, 20 mg, oral, Daily before breakfast, Shiv De La Rosa MD, 20 mg at 11/08/24 0917    polyethylene glycol (Glycolax, Miralax) packet 17 g, 17 g, oral, Daily, PARI Sousa, 17 g at 11/07/24 1804    pregabalin (Lyrica) capsule 200 mg, 200 mg, oral, BID, Shiv De La Rosa MD, 200 mg at 11/08/24 0917    QUEtiapine (SEROquel) tablet 25 mg, 25 mg, oral, Nightly, PARI Sousa, 25 mg at 11/07/24 2033    sodium chloride 0.9% infusion, 100 mL/hr, intravenous, Continuous, Zaid Rubin MD    tamsulosin (Flomax) 24 hr capsule 0.4 mg, 0.4 mg, oral, Daily, PARI Sousa, 0.4 mg at 11/08/24 0917    traZODone (Desyrel) tablet 50 mg, 50 mg, oral, Nightly, PARI Sousa, 50 mg at 11/07/24 2033    trihexyphenidyl (Artane) tablet 2 mg, 2 mg, oral, BID, PARI Sousa, 2 mg at 11/08/24 0917     PHYSICAL EXAM:  /86 (BP Location: Left arm, Patient  "Position: Lying)   Pulse 98   Temp 36.5 °C (97.7 °F) (Temporal)   Resp 18   Ht 1.753 m (5' 9\")   Wt 83.9 kg (185 lb)   SpO2 96%   BMI 27.32 kg/m²     Intake/Output Summary (Last 24 hours) at 11/8/2024 1354  Last data filed at 11/8/2024 1100  Gross per 24 hour   Intake --   Output 600 ml   Net -600 ml     Gen: Awake, NAD  Neck: No JVD  Cardiac: RRR  Resp: decrease BS  Abd: Soft, non tender, +BS, non distended   Ext: No edema   Neuro: moves 4 ext  Peripheral Pulses: Capillary refill <2secs, strong peripheral pulses.  Skin: Skin color, texture, turgor normal, no suspicious rashes or lesions.    LABS:   Results for orders placed or performed during the hospital encounter of 11/07/24 (from the past 24 hours)   POCT GLUCOSE   Result Value Ref Range    POCT Glucose 147 (H) 74 - 99 mg/dL   POCT GLUCOSE   Result Value Ref Range    POCT Glucose 142 (H) 74 - 99 mg/dL   Basic metabolic panel   Result Value Ref Range    Glucose 127 (H) 74 - 99 mg/dL    Sodium 127 (L) 136 - 145 mmol/L    Potassium 3.5 3.5 - 5.3 mmol/L    Chloride 98 98 - 107 mmol/L    Bicarbonate 19 (L) 21 - 32 mmol/L    Anion Gap 14 10 - 20 mmol/L    Urea Nitrogen 12 6 - 23 mg/dL    Creatinine 1.14 0.50 - 1.30 mg/dL    eGFR 70 >60 mL/min/1.73m*2    Calcium 9.7 8.6 - 10.3 mg/dL   CBC   Result Value Ref Range    WBC 11.5 (H) 4.4 - 11.3 x10*3/uL    nRBC 0.0 0.0 - 0.0 /100 WBCs    RBC 4.64 4.50 - 5.90 x10*6/uL    Hemoglobin 14.0 13.5 - 17.5 g/dL    Hematocrit 42.8 41.0 - 52.0 %    MCV 92 80 - 100 fL    MCH 30.2 26.0 - 34.0 pg    MCHC 32.7 32.0 - 36.0 g/dL    RDW 13.3 11.5 - 14.5 %    Platelets 265 150 - 450 x10*3/uL   TSH   Result Value Ref Range    Thyroid Stimulating Hormone 1.08 0.44 - 3.98 mIU/L   Uric Acid   Result Value Ref Range    Uric Acid 6.8 4.0 - 7.5 mg/dL   Urine electrolytes   Result Value Ref Range    Sodium, Urine Random 14 mmol/L    Sodium/Creatinine Ratio 7 Not established. mmol/g Creat    Potassium, Urine Random 42 mmol/L    " Potassium/Creatinine Ratio 20 Not established mmol/g Creat    Chloride, Urine Random <15 mmol/L    Chloride/Creatinine Ratio      Creatinine, Urine Random 215.1 20.0 - 370.0 mg/dL   Urinalysis with Reflex Culture and Microscopic   Result Value Ref Range    Color, Urine Yellow Light-Yellow, Yellow, Dark-Yellow    Appearance, Urine Clear Clear    Specific Gravity, Urine 1.019 1.005 - 1.035    pH, Urine 6.5 5.0, 5.5, 6.0, 6.5, 7.0, 7.5, 8.0    Protein, Urine 30 (1+) (A) NEGATIVE, 10 (TRACE), 20 (TRACE) mg/dL    Glucose, Urine Normal Normal mg/dL    Blood, Urine NEGATIVE NEGATIVE    Ketones, Urine NEGATIVE NEGATIVE mg/dL    Bilirubin, Urine NEGATIVE NEGATIVE    Urobilinogen, Urine Normal Normal mg/dL    Nitrite, Urine NEGATIVE NEGATIVE    Leukocyte Esterase, Urine 500 Juan Luis/µL (A) NEGATIVE   Microscopic Only, Urine   Result Value Ref Range    WBC, Urine 21-50 (A) 1-5, NONE /HPF    RBC, Urine 3-5 NONE, 1-2, 3-5 /HPF    Bacteria, Urine 2+ (A) NONE SEEN /HPF    Budding Yeast, Urine PRESENT (A) NONE /HPF    Mucus, Urine 1+ Reference range not established. /LPF    Hyaline Casts, Urine 1+ (A) NONE /LPF   POCT GLUCOSE   Result Value Ref Range    POCT Glucose 143 (H) 74 - 99 mg/dL       DATA:   Diagnostic tests reviewed for today's visit:    Labs and meds  CMP, CBC, urine lytes seen    ASSESSMENT AND PLAN:  - Hyponatremia: new onset, hypotensive on admission and still tachycardic, perhaps with mild degree of hypovolemia supportive by urine sodium low at 14. Normal TSH and uric acid   Serum and urine osm pending to determine role of ADH  Also some of his meds could be related to hyponatremia, such seroquel, trazadone and cymbalta, but not new to him    PLAN;  - giving him 1L of NS  drip, following labs     Greatly appreciate the opportunity to assist in the care of this patient. Will continue to follow.     Signature: Zaid Rubin MD  Division of Nephrology and Hypertension

## 2024-11-08 NOTE — H&P
History Of Present Illness  Brannon Engel is a 69 y.o. male presenting with fall  Pt is a 70 y/o M with a PMH of empysema, GERD, HLD, HTN, PVD, DM2 and schizophrenia presenting to the ED after a fall   Pt seen in the ED , he is able to provide limited hx  Had a fall at the facility and did have difficulty walking and pain in the rt hip ,  ED workup with ct cspine, ct head  Xr of rt hip   Negative for fracture  Admitted as does have difficulty walking and needs pain controlled     Past Medical History  He has a past medical history of Dysphagia, Emphysema lung (Multi), GERD (gastroesophageal reflux disease), HLD (hyperlipidemia), Hypertension, essential, Low back pain, PVD (peripheral vascular disease) (CMS-[facility]), Schizophrenia, and Type 2 diabetes mellitus.    Surgical History  He has no past surgical history on file.     Social History  He reports that he has quit smoking. His smoking use included cigarettes. He does not have any smokeless tobacco history on file. No history on file for alcohol use and drug use.    Family History  Family History   Problem Relation Name Age of Onset    No Known Problems Mother      No Known Problems Father          Allergies  Ace inhibitors    Review of Systems     No chsetp ain   No shortness of breath   No cough   No h/o freq falls    Physical Exam     Well developed well nurished  No distress  Ao place  Face symmetrical   Neck no jvd no bruit  Chest clear  CVS regular  Ext no edema  Abdo soft nontender bs active, no masses  Cns alert appropriate able to move ext   Skin intact  Psych normal affect    Last Recorded Vitals  /65 (BP Location: Left arm, Patient Position: Lying)   Pulse 84   Temp 36.6 °C (97.9 °F) (Temporal)   Resp 18   Wt 83.9 kg (185 lb)   SpO2 97%     Relevant Results  Scheduled medications  enoxaparin, 40 mg, subcutaneous, q24h  insulin lispro, 13 Units, subcutaneous, TID  lidocaine, 1 patch, transdermal, Daily  lithium ER, 300 mg, oral, BID  ondansetron  ODT, 4 mg, oral, Once  polyethylene glycol, 17 g, oral, Daily  QUEtiapine, 25 mg, oral, Nightly  tamsulosin, 0.4 mg, oral, Daily  traZODone, 50 mg, oral, Nightly  trihexyphenidyl, 2 mg, oral, BID      Continuous medications     PRN medications  PRN medications: acetaminophen **OR** acetaminophen **OR** acetaminophen  Results for orders placed or performed during the hospital encounter of 11/07/24 (from the past 96 hours)   CBC and Auto Differential   Result Value Ref Range    WBC 15.5 (H) 4.4 - 11.3 x10*3/uL    nRBC 0.0 0.0 - 0.0 /100 WBCs    RBC 4.91 4.50 - 5.90 x10*6/uL    Hemoglobin 14.6 13.5 - 17.5 g/dL    Hematocrit 44.7 41.0 - 52.0 %    MCV 91 80 - 100 fL    MCH 29.7 26.0 - 34.0 pg    MCHC 32.7 32.0 - 36.0 g/dL    RDW 13.3 11.5 - 14.5 %    Platelets 290 150 - 450 x10*3/uL    Neutrophils % 79.3 40.0 - 80.0 %    Immature Granulocytes %, Automated 0.4 0.0 - 0.9 %    Lymphocytes % 9.9 13.0 - 44.0 %    Monocytes % 9.0 2.0 - 10.0 %    Eosinophils % 1.1 0.0 - 6.0 %    Basophils % 0.3 0.0 - 2.0 %    Neutrophils Absolute 12.31 (H) 1.20 - 7.70 x10*3/uL    Immature Granulocytes Absolute, Automated 0.06 0.00 - 0.70 x10*3/uL    Lymphocytes Absolute 1.54 1.20 - 4.80 x10*3/uL    Monocytes Absolute 1.39 (H) 0.10 - 1.00 x10*3/uL    Eosinophils Absolute 0.17 0.00 - 0.70 x10*3/uL    Basophils Absolute 0.05 0.00 - 0.10 x10*3/uL   Comprehensive metabolic panel   Result Value Ref Range    Glucose 90 74 - 99 mg/dL    Sodium 129 (L) 136 - 145 mmol/L    Potassium 5.0 3.5 - 5.3 mmol/L    Chloride 99 98 - 107 mmol/L    Bicarbonate 24 21 - 32 mmol/L    Anion Gap 11 10 - 20 mmol/L    Urea Nitrogen 9 6 - 23 mg/dL    Creatinine 1.08 0.50 - 1.30 mg/dL    eGFR 75 >60 mL/min/1.73m*2    Calcium 10.0 8.6 - 10.3 mg/dL    Albumin 3.8 3.4 - 5.0 g/dL    Alkaline Phosphatase 99 33 - 136 U/L    Total Protein 7.1 6.4 - 8.2 g/dL    AST 37 9 - 39 U/L    Bilirubin, Total 0.6 0.0 - 1.2 mg/dL    ALT 25 10 - 52 U/L   POCT GLUCOSE   Result Value Ref Range     POCT Glucose 147 (H) 74 - 99 mg/dL   POCT GLUCOSE   Result Value Ref Range    POCT Glucose 142 (H) 74 - 99 mg/dL            Assessment/Plan   Pt is a 70 y/o M with a PMH of empysema, GERD, HLD, HTN, PVD, DM2 and schizophrenia presenting to the ED after a fall   Assessment & Plan  Falls frequently    Leucocytosis  Hyponatremia   Schizophrenia   HTN  DM  PVD    Will cont to monitor   Ot and pt  Recheck labs in am  Check UA  Insulin resume home meds           Shiv De La Rosa MD

## 2024-11-08 NOTE — PROGRESS NOTES
Occupational Therapy    Evaluation    Patient Name: Brannon Engel  MRN: [id number]  Department: [facility]  Room: [address] [place] [address]  Today's Date: 11/8/2024  Time Calculation  Start Time: 0856  Stop Time: 0910  Time Calculation (min): 14 min        Assessment:  OT Assessment: Pt presents to OT and demos decreased balance, strength, endurance and cognition/safety awareness resulting in decreased safety and independence with ADLs and funcitonal transfers/mobility. Pt requires skilled OT services to address above deficits to safely return to OF.  Prognosis: Good  Evaluation/Treatment Tolerance: Patient limited by pain, Patient limited by fatigue  Medical Staff Made Aware: Yes  End of Session Communication: Bedside nurse  End of Session Patient Position: Bed, 3 rail up, Alarm on  OT Assessment Results: Decreased ADL status, Decreased upper extremity strength, Decreased safe judgment during ADL, Decreased cognition, Decreased endurance, Decreased functional mobility, Decreased trunk control for functional activities  Prognosis: Good  Evaluation/Treatment Tolerance: Patient limited by pain, Patient limited by fatigue  Medical Staff Made Aware: Yes  Strengths: Support of Caregivers, Living arrangement secure  Barriers to Participation: Comorbidities, Insight into problems  Plan:  Treatment Interventions: ADL retraining, Functional transfer training, UE strengthening/ROM, Endurance training, Cognitive reorientation, Patient/family training, Equipment evaluation/education, Neuromuscular reeducation, Compensatory technique education  OT Frequency: 3 times per week  OT Discharge Recommendations: Moderate intensity level of continued care  Equipment Recommended upon Discharge:  (TBD)  OT Recommended Transfer Status: Moderate assist, Assist of 2  OT - OK to Discharge: Yes (OT POC established this date)  Treatment Interventions: ADL retraining, Functional transfer training, UE strengthening/ROM, Endurance training, Cognitive reorientation,  Patient/family training, Equipment evaluation/education, Neuromuscular reeducation, Compensatory technique education    Subjective     General:  General  Reason for Referral: Pt is a 70 y/o male who presented to the ED s/p fall at [facility]. Imaging (-) for acute changes.  Referred By: DARIEN Sousa-CNP  Past Medical History Relevant to Rehab:   Past Medical History:   Diagnosis Date    Dysphagia     Emphysema lung (Multi)     GERD (gastroesophageal reflux disease)     HLD (hyperlipidemia)     Hypertension, essential     Low back pain     PVD (peripheral vascular disease) (CMS-HCC)     Schizophrenia     Type 2 diabetes mellitus       Family/Caregiver Present: No  Co-Treatment: PT  Co-Treatment Reason: to maximize safety and participation with skilled intervention  Prior to Session Communication: Bedside nurse  Patient Position Received: Bed, 3 rail up, Alarm on  General Comment: Pt supine in bed upon arrival and agreeable to OT Eval with min encouragment for OOB activity. Pt demos confusion and irritable at times.  Precautions:  Medical Precautions: Fall precautions            Pain:  Pain Assessment  Pain Assessment: 0-10  0-10 (Numeric) Pain Score: 9  Pain Type: Acute pain  Pain Location: Hip  Pain Orientation: Right, Left  Pain Interventions: Repositioned    Objective   Cognition:  Overall Cognitive Status: Impaired  Orientation Level: Disoriented to time  Safety/Judgement: Exceptions to WFL  Insight: Mild  Impulsive: Mildly  Processing Speed: Delayed           Home Living:  Type of Home: Assisted living  Lives With: Alone  Home Adaptive Equipment: Walker rolling or standard, Cane  Home Layout: One level  Home Access: No concerns  Bathroom Shower/Tub: Walk-in shower  Bathroom Toilet: Handicapped height  Bathroom Equipment: Grab bars in shower, Raised toilet seat with rails, Shower chair with back  Prior Function:  Level of [place]: Independent with ADLs and functional transfers, Needs assistance with  homemaking  Receives Help From:  ([facility] staff)  ADL Assistance: Independent  Homemaking Assistance: Needs assistance ([facility] staff assists)  Ambulatory Assistance: Independent (using SPC)  Prior Function Comments: Pt reports x1 fall in past 6 months. - driving       ADL:  Grooming Assistance: Stand by  Grooming Deficit: Wash/dry face, Verbal cueing, Supervision/safety (bed level)  UE Dressing Assistance: Minimal  UE Dressing Deficit:  (to don hospital gown, cues for sequencing)  LE Dressing Assistance: Maximal  LE Dressing Deficit: Don/doff R sock, Don/doff L sock  Toileting Deficit: Urinary Catheter  Activity Tolerance:  Endurance: Tolerates 10 - 20 min exercise with multiple rests  Bed Mobility/Transfers: Bed Mobility  Bed Mobility: Yes  Bed Mobility 1  Bed Mobility 1: Supine to sitting, Sitting to supine  Level of Assistance 1: Contact guard, Moderate verbal cues  Bed Mobility Comments 1: cues for sequencing and continued effort throughout, extra time and effort required to perform    Transfers  Transfer: Yes  Transfer 1  Technique 1: Sit to stand, Stand to sit  Transfer Device 1: Walker  Transfer Level of Assistance 1: Moderate assistance, +2, Moderate verbal cues  Trials/Comments 1: cues for sequencing, safe hand placement and walker safety, encouragment for pt to perform as pt initially declining standing trials, from EOB      Functional Mobility:  Functional Mobility  Functional Mobility Performed:  (instructed pt in side stepping to R side at EOB however pt unable to perform d/t B hip pain)  Sitting Balance:  Static Sitting Balance  Static Sitting-Balance Support: Bilateral upper extremity supported, Feet supported  Static Sitting-Level of Assistance: Close supervision  Static Sitting-Comment/Number of Minutes: at EOB  Dynamic Sitting Balance  Dynamic Sitting-Comments: SBA-CGA at EOB  Standing Balance:  Static Standing Balance  Static Standing-Balance Support: Bilateral upper extremity supported  Static  Standing-Level of Assistance: Minimum assistance (x2)  Static Standing-Comment/Number of Minutes: FWW     Strength:  Strength Comments: B UEs grossly 3/5 based on function       Extremities: RUE   RUE : Within Functional Limits and LUE   LUE: Within Functional Limits      Outcome Measures:[facility] Daily Activity  Putting on and taking off regular lower body clothing: A lot  Bathing (including washing, rinsing, drying): A lot  Putting on and taking off regular upper body clothing: A little  Toileting, which includes using toilet, bedpan or urinal: A lot  Taking care of personal grooming such as brushing teeth: A little  Eating Meals: A little  Daily Activity - Total Score: 15        Education Documentation  Body Mechanics, taught by Edith Low OT at 11/8/2024 10:10 AM.  Learner: Patient  Readiness: Acceptance  Method: Explanation  Response: Verbalizes Understanding, Needs Reinforcement    Precautions, taught by Edith Low OT at 11/8/2024 10:10 AM.  Learner: Patient  Readiness: Acceptance  Method: Explanation  Response: Verbalizes Understanding, Needs Reinforcement    ADL Training, taught by Edith Low OT at 11/8/2024 10:10 AM.  Learner: Patient  Readiness: Acceptance  Method: Explanation  Response: Verbalizes Understanding, Needs Reinforcement    Education Comments  No comments found.           Goals:  Encounter Problems       Encounter Problems (Active)       ADLs       Patient will perform UB and LB bathing with minimal assist  level of assistance and grab bars, shower chair, and long-handled sponge.       Start:  11/08/24    Expected End:  11/22/24            Patient with complete upper body dressing with supervision level of assistance donning and doffing all UE clothes with PRN adaptive equipment.       Start:  11/08/24    Expected End:  11/22/24            Patient with complete lower body dressing with minimal assist  level of assistance donning and doffing all LE clothes  with PRN adaptive  equipment.       Start:  11/08/24    Expected End:  11/22/24            Patient will complete daily grooming tasks with set-up level of assistance and PRN adaptive equipment.       Start:  11/08/24    Expected End:  11/22/24            Patient will complete toileting including hygiene clothing management/hygiene with minimal assist  level of assistance and raised toilet seat and grab bars vs. BSC.       Start:  11/08/24    Expected End:  11/22/24               MOBILITY       Patient will perform Functional mobility x Household distances/Community Distances with contact guard assist level of assistance and least restrictive device in order to improve safety and functional mobility.       Start:  11/08/24    Expected End:  11/22/24               TRANSFERS       Patient will perform bed mobility modified independent level of assistance in order to improve safety and independence with mobility       Start:  11/08/24    Expected End:  11/22/24            Patient will complete functional transfers with least restrictive device with contact guard assist level of assistance.       Start:  11/08/24    Expected End:  11/22/24

## 2024-11-08 NOTE — PROGRESS NOTES
Attempted to call patient multiple times to obtain FOC he did not answer his phone. Will need weekend follow up.

## 2024-11-08 NOTE — CARE PLAN
The patient's goals for the shift include      The clinical goals for the shift include patient will remain free from falls and injury    Problem: Skin  Goal: Decreased wound size/increased tissue granulation at next dressing change  Flowsheets (Taken 11/8/2024 1115)  Decreased wound size/increased tissue granulation at next dressing change: Promote sleep for wound healing  Goal: Participates in plan/prevention/treatment measures  Flowsheets (Taken 11/8/2024 1115)  Participates in plan/prevention/treatment measures: Discuss with provider PT/OT consult  Goal: Prevent/manage excess moisture  Flowsheets (Taken 11/8/2024 1115)  Prevent/manage excess moisture:   Cleanse incontinence/protect with barrier cream   Moisturize dry skin  Goal: Prevent/minimize sheer/friction injuries  Flowsheets (Taken 11/8/2024 1115)  Prevent/minimize sheer/friction injuries:   HOB 30 degrees or less   Use pull sheet  Goal: Promote/optimize nutrition  Flowsheets (Taken 11/8/2024 1115)  Promote/optimize nutrition: Consume > 50% meals/supplements  Goal: Promote skin healing  Flowsheets (Taken 11/8/2024 1115)  Promote skin healing: Assess skin/pad under line(s)/device(s)

## 2024-11-08 NOTE — PROGRESS NOTES
Brannon Engel is a 69 y.o. male on day 0 of admission presenting with Falls frequently.      Subjective           Objective     Last Recorded Vitals  /85   Pulse 94   Temp 36.7 °C (98.1 °F) (Temporal)   Resp 18   Wt 83.9 kg (185 lb)   SpO2 97%   Intake/Output last 3 Shifts:  No intake or output data in the 24 hours ending 11/08/24 0821    Admission Weight  Weight: 83.9 kg (185 lb) (11/07/24 1039)    Daily Weight  11/07/24 : 83.9 kg (185 lb)    Image Results  XR hip right with pelvis when performed 2 or 3 views  Narrative: Interpreted By:  Brendan Taylor,   STUDY:  XR HIP RIGHT WITH PELVIS WHEN PERFORMED 2 OR 3 VIEWS; ;  11/7/2024  11:00 am      INDICATION:  Signs/Symptoms:fall.      COMPARISON:  None.      ACCESSION NUMBER(S):  [id number]      ORDERING CLINICIAN:  SALTY CRUZ      FINDINGS:  Four view radiographs of the pelvis and right hip were obtained.  There is noncemented total left hip arthroplasty.  The hardware is intact. No acute fracture is seen.  There is no lucency around the hardware to suggest loosening.  The alignment is anatomic.  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 Penile prosthesis is in position.      Impression: Intact total left hip arthroplasty. Degenerative changes of the right  hip joint. No acute fracture or subluxation.      Signed by: Brendan Taylor 11/7/2024 11:42 AM  Dictation workstation:   [id number]  CT cervical spine wo IV contrast  Narrative: Interpreted By:  Dagoberto Trejo,   STUDY:  CT CERVICAL SPINE WO IV CONTRAST;  11/7/2024 10:52 am      INDICATION:  Signs/Symptoms:fall.          COMPARISON:  None.      ACCESSION NUMBER(S):  [id number]      ORDERING CLINICIAN:  SALTY CRUZ      TECHNIQUE:  Axial CT images of the cervical spine are obtained. Axial, coronal  and sagittal reconstructions are provided for review.      FINDINGS:  Minimal retrolisthesis  of C6 and C7.. The vertebral body heights are  preserved. Disc space loss from C5 through C7 with endplate sclerosis  and osteophytes. No erosions. No acute fracture-dislocation. Varying  degrees of central canal and neural foraminal narrowing bilaterally.      Incidental note is made of biapical cystic changes and scarring.      Impression: No evidence for an acute fracture or subluxation of the cervical  spine.      MACRO:  None      Signed by: Dagoberto Trejo 11/7/2024 11:34 AM  Dictation workstation:   [id number]  CT head wo IV contrast  Narrative: Interpreted By:  Dagoberto Trejo,   STUDY:  CT HEAD WO IV CONTRAST;  11/7/2024 10:52 am      INDICATION:  Signs/Symptoms:fall.          COMPARISON:  09/05/2024      ACCESSION NUMBER(S):  [id number]      ORDERING CLINICIAN:  SALTY CRUZ      TECHNIQUE:  Noncontrast axial CT scan of head was performed. Angled reformats in  brain and bone windows were generated. The images were reviewed in  bone, brain, blood and soft tissue windows.      FINDINGS:  The ventricles, cisterns and sulci are prominent, consistent with  moderate diffuse volume loss. There are areas of nonspecific white  matter hypodensity, which are probably age-related or microvascular  in nature.      Gray-white differentiation is intact and there is no evidence of  acute cortical infarct. No mass, mass effect or midline shift is  seen. There is no evidence of hemorrhage.      The visualized paranasal sinuses are clear. Nasal septal deviation to  the left.          Impression: No evidence of acute cortical infarct or intracranial hemorrhage.      Chronic changes as described.      MACRO:  None      Signed by: Dagoberto Trejo 11/7/2024 11:14 AM  Dictation workstation:   [id number]      Physical Exam    Well developed well nurished  No distress  Ao place  Face symmetrical   Neck no jvd no bruit  Chest clear  CVS regular  Ext no edema  Abdo soft nontender bs active, no masses  Cns alert appropriate able to  move ext   Skin intact  Psych normal affect  Relevant Results             No current facility-administered medications on file prior to encounter.     Current Outpatient Medications on File Prior to Encounter   Medication Sig Dispense Refill    insulin lispro (HumaLOG KwikPen Insulin) 200 unit/mL (3 mL) insulin pen pen Inject 13 Units under the skin.   Inject 13 Units under the skin 3 times daily (with meals). if blood sugar is over 80      lithium ER (Lithobid) 300 mg 12 hr tablet Take 1 tablet (300 mg) by mouth twice a day.      QUEtiapine (SEROquel) 25 mg tablet Take 1 tablet daily at bedtimes      tamsulosin (Flomax) 0.4 mg 24 hr capsule Take 1 capsule (0.4 mg) by mouth once daily.      traZODone (Desyrel) 50 mg tablet Take 2.5 tablets (125 mg) by mouth once daily at bedtime.      triamcinolone (Kenalog) 0.1 % cream Apply topically twice a day. Monday - friday      trihexyphenidyl (Artane) 2 mg tablet Take 1 tablet (2 mg) by mouth twice a day.      zolpidem (Ambien) 5 mg tablet Take 1 tablet (5 mg) by mouth.      acetaminophen (Tylenol) 500 mg tablet Take 2 tablets (1,000 mg) by mouth every 8 hours if needed for mild pain (1 - 3).      aspirin 81 mg EC tablet Take 1 tablet (81 mg) by mouth once daily.      atorvastatin (Lipitor) 80 mg tablet Take 1 tablet (80 mg) by mouth once daily.      calcium carbonate-vitamin D3 500 mg-5 mcg (200 unit) tablet Take 1 tablet by mouth 2 times a day.      cholecalciferol (Vitamin D3) 25 MCG (1000 UT) tablet Take 1 tablet (1,000 Units) by mouth once daily.      cyanocobalamin (Vitamin B-12) 1,000 mcg tablet Take 1 tablet (1,000 mcg) by mouth once daily.      diclofenac sodium (Voltaren Arthritis Pain) 1 % gel Apply 4.5 inches (4 g) topically every 2 hours if needed.      docusate sodium (Colace) 100 mg capsule Take 1 capsule (100 mg) by mouth 2 times a day.      DULoxetine (Cymbalta) 60 mg DR capsule Take 1 capsule (60 mg) by mouth once daily. Do not crush or chew.       esomeprazole (NexIUM) 40 mg DR capsule Take 1 capsule (40 mg) by mouth 2 times a day. Do not open capsule.      ferrous sulfate 325 (65 Fe) MG EC tablet Take 65 mg by mouth once daily. M,W,F  Do not crush, chew, or split.      fluticasone (Flonase) 50 mcg/actuation nasal spray Administer 1 spray into each nostril once daily. Shake gently. Before first use, prime pump. After use, clean tip and replace cap.      glucose (Glutose) 40 % gel oral gel Take 15 g by mouth if needed for low blood sugar - see comments.      hydrocortisone (Anusol-HC) 25 mg suppository Insert 1 suppository (25 mg) into the rectum 2 times a day as needed for hemorrhoids.      ibuprofen 200 mg tablet Take 1 tablet (200 mg) by mouth every 6 hours if needed for mild pain (1 - 3).      insulin glargine (Lantus U-100 Insulin) 100 unit/mL injection Inject 44 Units under the skin once daily at bedtime. Take as directed per insulin instructions.      ipratropium-albuteroL (Duo-Neb) 0.5-2.5 mg/3 mL nebulizer solution Take 3 mL by nebulization every 6 hours.      isosorbide mononitrate ER (Imdur) 60 mg 24 hr tablet Take 1 tablet (60 mg) by mouth once daily. Do not crush or chew.      magnesium hydroxide (Milk of Magnesia) 400 mg/5 mL suspension Take 30 mL by mouth once daily as needed for constipation.      multivitamin tablet Take 1 tablet by mouth once daily.      nitroglycerin (Nitrostat) 0.4 mg SL tablet Place 1 tablet (0.4 mg) under the tongue every 5 minutes if needed for chest pain.      oxybutynin XL (Ditropan-XL) 5 mg 24 hr tablet Take 3 tablets (15 mg) by mouth once daily. Do not crush, chew, or split.      oxyCODONE (Oxy-IR) 5 mg immediate release capsule Take 1 capsule (5 mg) by mouth every 6 hours if needed for severe pain (7 - 10).      pregabalin (Lyrica) 200 mg capsule Take 1 capsule (200 mg) by mouth 2 times a day.      semaglutide (Ozempic) 0.25 mg or 0.5 mg(2 mg/1.5 mL) pen injector Inject 0.25 mg under the skin 1 (one) time per week.  saturday      varenicline (Chantix) 1 mg tablet Take 1 tablet (1 mg) by mouth 2 times a day. Take with full glass of water.         Results for orders placed or performed during the hospital encounter of 11/07/24 (from the past 24 hours)   CBC and Auto Differential   Result Value Ref Range    WBC 15.5 (H) 4.4 - 11.3 x10*3/uL    nRBC 0.0 0.0 - 0.0 /100 WBCs    RBC 4.91 4.50 - 5.90 x10*6/uL    Hemoglobin 14.6 13.5 - 17.5 g/dL    Hematocrit 44.7 41.0 - 52.0 %    MCV 91 80 - 100 fL    MCH 29.7 26.0 - 34.0 pg    MCHC 32.7 32.0 - 36.0 g/dL    RDW 13.3 11.5 - 14.5 %    Platelets 290 150 - 450 x10*3/uL    Neutrophils % 79.3 40.0 - 80.0 %    Immature Granulocytes %, Automated 0.4 0.0 - 0.9 %    Lymphocytes % 9.9 13.0 - 44.0 %    Monocytes % 9.0 2.0 - 10.0 %    Eosinophils % 1.1 0.0 - 6.0 %    Basophils % 0.3 0.0 - 2.0 %    Neutrophils Absolute 12.31 (H) 1.20 - 7.70 x10*3/uL    Immature Granulocytes Absolute, Automated 0.06 0.00 - 0.70 x10*3/uL    Lymphocytes Absolute 1.54 1.20 - 4.80 x10*3/uL    Monocytes Absolute 1.39 (H) 0.10 - 1.00 x10*3/uL    Eosinophils Absolute 0.17 0.00 - 0.70 x10*3/uL    Basophils Absolute 0.05 0.00 - 0.10 x10*3/uL   Comprehensive metabolic panel   Result Value Ref Range    Glucose 90 74 - 99 mg/dL    Sodium 129 (L) 136 - 145 mmol/L    Potassium 5.0 3.5 - 5.3 mmol/L    Chloride 99 98 - 107 mmol/L    Bicarbonate 24 21 - 32 mmol/L    Anion Gap 11 10 - 20 mmol/L    Urea Nitrogen 9 6 - 23 mg/dL    Creatinine 1.08 0.50 - 1.30 mg/dL    eGFR 75 >60 mL/min/1.73m*2    Calcium 10.0 8.6 - 10.3 mg/dL    Albumin 3.8 3.4 - 5.0 g/dL    Alkaline Phosphatase 99 33 - 136 U/L    Total Protein 7.1 6.4 - 8.2 g/dL    AST 37 9 - 39 U/L    Bilirubin, Total 0.6 0.0 - 1.2 mg/dL    ALT 25 10 - 52 U/L   POCT GLUCOSE   Result Value Ref Range    POCT Glucose 147 (H) 74 - 99 mg/dL   POCT GLUCOSE   Result Value Ref Range    POCT Glucose 142 (H) 74 - 99 mg/dL   Basic metabolic panel   Result Value Ref Range    Glucose 127 (H) 74 -  99 mg/dL    Sodium 127 (L) 136 - 145 mmol/L    Potassium 3.5 3.5 - 5.3 mmol/L    Chloride 98 98 - 107 mmol/L    Bicarbonate 19 (L) 21 - 32 mmol/L    Anion Gap 14 10 - 20 mmol/L    Urea Nitrogen 12 6 - 23 mg/dL    Creatinine 1.14 0.50 - 1.30 mg/dL    eGFR 70 >60 mL/min/1.73m*2    Calcium 9.7 8.6 - 10.3 mg/dL   CBC   Result Value Ref Range    WBC 11.5 (H) 4.4 - 11.3 x10*3/uL    nRBC 0.0 0.0 - 0.0 /100 WBCs    RBC 4.64 4.50 - 5.90 x10*6/uL    Hemoglobin 14.0 13.5 - 17.5 g/dL    Hematocrit 42.8 41.0 - 52.0 %    MCV 92 80 - 100 fL    MCH 30.2 26.0 - 34.0 pg    MCHC 32.7 32.0 - 36.0 g/dL    RDW 13.3 11.5 - 14.5 %    Platelets 265 150 - 450 x10*3/uL   TSH   Result Value Ref Range    Thyroid Stimulating Hormone 1.08 0.44 - 3.98 mIU/L   Uric Acid   Result Value Ref Range    Uric Acid 6.8 4.0 - 7.5 mg/dL   Urine electrolytes   Result Value Ref Range    Sodium, Urine Random 14 mmol/L    Sodium/Creatinine Ratio 7 Not established. mmol/g Creat    Potassium, Urine Random 42 mmol/L    Potassium/Creatinine Ratio 20 Not established mmol/g Creat    Chloride, Urine Random <15 mmol/L    Chloride/Creatinine Ratio      Creatinine, Urine Random 215.1 20.0 - 370.0 mg/dL   Urinalysis with Reflex Culture and Microscopic   Result Value Ref Range    Color, Urine Yellow Light-Yellow, Yellow, Dark-Yellow    Appearance, Urine Clear Clear    Specific Gravity, Urine 1.019 1.005 - 1.035    pH, Urine 6.5 5.0, 5.5, 6.0, 6.5, 7.0, 7.5, 8.0    Protein, Urine 30 (1+) (A) NEGATIVE, 10 (TRACE), 20 (TRACE) mg/dL    Glucose, Urine Normal Normal mg/dL    Blood, Urine NEGATIVE NEGATIVE    Ketones, Urine NEGATIVE NEGATIVE mg/dL    Bilirubin, Urine NEGATIVE NEGATIVE    Urobilinogen, Urine Normal Normal mg/dL    Nitrite, Urine NEGATIVE NEGATIVE    Leukocyte Esterase, Urine 500 Juan Luis/µL (A) NEGATIVE   Microscopic Only, Urine   Result Value Ref Range    WBC, Urine 21-50 (A) 1-5, NONE /HPF    RBC, Urine 3-5 NONE, 1-2, 3-5 /HPF    Bacteria, Urine 2+ (A) NONE SEEN /HPF     Budding Yeast, Urine PRESENT (A) NONE /HPF    Mucus, Urine 1+ Reference range not established. /LPF    Hyaline Casts, Urine 1+ (A) NONE /LPF   POCT GLUCOSE   Result Value Ref Range    POCT Glucose 143 (H) 74 - 99 mg/dL       Assessment/Plan                  Assessment & Plan  Falls frequently     Check ct hip and pelvis to rule out fracture          Urine osm, lytes and serium osm  Consult renal   Will add fluid restriction  Trend labs       Schizophrenia   HTN  DM  PVD     Will cont to monitor   Ot and pt  ##Transcribed for Dr. TOM De La Rosa##

## 2024-11-08 NOTE — PROGRESS NOTES
Physical Therapy    Physical Therapy Evaluation    Patient Name: Brannon Engel  MRN: [id number]  Department: [place] [address]  Room: [address] [place] [address]  Today's Date: 11/8/2024   Time Calculation  Start Time: 0853  Stop Time: 0911  Time Calculation (min): 18 min    Assessment/Plan   PT Assessment  PT Assessment Results: Decreased strength, Decreased range of motion, Decreased endurance, Impaired balance, Decreased mobility, Pain, Impaired judgement, Decreased safety awareness  Rehab Prognosis: Good  End of Session Communication: Bedside nurse  Assessment Comment: PT Evaluation Completed. The patient presented with decreased mobility, balance, endurance, strength, safety awareness, and increased pain and fatigue. These impairments are negatively impacting his ability to perform at baseline functional level, in home environment. The patient is at risk of falls & injury. This patient would benefit from skilled therapy intervention to address limitations and progress towards the PT goals.  Anticipate moderate frequency PT needs at discharge  End of Session Patient Position: Bed, 3 rail up, Alarm on  IP OR SWING BED PT PLAN  Inpatient or Swing Bed: Inpatient  PT Plan  Treatment/Interventions: Bed mobility, Transfer training, Gait training, Balance training, Strengthening, Endurance training, Range of motion, Therapeutic exercise, Therapeutic activity, Home exercise program  PT Plan: Ongoing PT  PT Frequency: 3 times per week  PT Discharge Recommendations: Moderate intensity level of continued care  PT Recommended Transfer Status: Assist x1  PT - OK to Discharge: Yes (PT POC established.)    Subjective   General Visit Information:  General  Reason for Referral: To ED s/p fall at [facility]. Imaging (-) for acute changes.  Referred By: DARIEN Sousa-CNP  Past Medical History Relevant to Rehab:   Past Medical History:   Diagnosis Date    Dysphagia     Emphysema lung (Multi)     GERD (gastroesophageal reflux disease)     HLD (hyperlipidemia)      Hypertension, essential     Low back pain     PVD (peripheral vascular disease) (CMS-HCC)     Schizophrenia     Type 2 diabetes mellitus        Co-Treatment: OT  Co-Treatment Reason: To increase patient safety, participation, and transfer ability.  Prior to Session Communication: Bedside nurse  Patient Position Received: Bed, 3 rail up, Alarm on  General Comment: Pt pleasant and agreeable to PT eval with encouragement  Home Living:  Home Living  Type of Home: Assisted living  Lives With: Alone  Home Adaptive Equipment: Walker rolling or standard, Cane  Home Layout: One level  Home Access: Level entry  Bathroom Equipment: Shower chair with back  Prior Level of Function:  Prior Function Per Pt/Caregiver Report  Level of [place]: Independent with ADLs and functional transfers, Needs assistance with homemaking  Receives Help From:  (facility staff)  ADL Assistance: Independent  Homemaking Assistance: Needs assistance  Ambulatory Assistance: Independent (Using RW)  Prior Function Comments: Denies any additional falls. Does not drive.  Precautions:  Precautions  Medical Precautions: Fall precautions     Vital Signs (Past 2hrs)                 Objective   Pain:  Pain Assessment  Pain Assessment: 0-10  0-10 (Numeric) Pain Score: 9  Pain Location:  (Hal hips)  Pain Interventions:  (RN aware)  Cognition:  Cognition  Overall Cognitive Status: Impaired (Mild confusion)  Orientation Level: Disoriented to time  Safety/Judgement: Exceptions to WFL  Insight: Mild    General Assessments:  Activity Tolerance  Endurance: Tolerates 10 - 20 min exercise with multiple rests    Sensation  Light Touch: No apparent deficits         Postural Control  Postural Control: Within Functional Limits    Static Sitting Balance  Static Sitting-Balance Support: Feet supported, Bilateral upper extremity supported  Static Sitting-Level of Assistance: Close supervision  Dynamic Sitting Balance  Dynamic Sitting-Balance Support: Feet supported,  Bilateral upper extremity supported  Dynamic Sitting-Level of Assistance: Close supervision    Static Standing Balance  Static Standing-Balance Support: Bilateral upper extremity supported  Static Standing-Level of Assistance: Minimum assistance  Functional Assessments:  Bed Mobility  Bed Mobility: Yes  Bed Mobility 1  Bed Mobility 1: Supine to sitting  Level of Assistance 1: Close supervision  Bed Mobility Comments 1: Signficantly increased time and effort to complete.    Transfers  Transfer: Yes  Transfer 1  Technique 1: Sit to stand, Stand to sit  Transfer Device 1: Walker  Transfer Level of Assistance 1: Moderate assistance, +2  Trials/Comments 1: Cues for hand placement and scooting to the EOB. Increased time to complete.    Ambulation/Gait Training  Ambulation/Gait Training Performed: No (Pt attempts to sidestep however quickly loses balance retro. Grossly unable to ambulate.)  Extremity/Trunk Assessments:  RUE   RUE : Within Functional Limits  LUE   LUE: Within Functional Limits  RLE   RLE :  (Strength grossly 3/5 based on observation of functional mobility. ROM WFL.)  LLE   LLE :  (Strength grossly 3/5 based on observation of functional mobility. ROM WFL.)  Outcome Measures:  [facility] Basic Mobility  Turning from your back to your side while in a flat bed without using bedrails: A little  Moving from lying on your back to sitting on the side of a flat bed without using bedrails: A little  Moving to and from bed to chair (including a wheelchair): Total  Standing up from a chair using your arms (e.g. wheelchair or bedside chair): A lot  To walk in hospital room: Total  Climbing 3-5 steps with railing: Total  Basic Mobility - Total Score: 11    Encounter Problems       Encounter Problems (Active)       Balance       Pt will tolerate 10+ mins dynamic standing balance activities with CGA or less and no LOB.        Start:  11/08/24    Expected End:  11/22/24               Mobility       STG - Patient will ambulate 50  ft with CGA and a RW.        Start:  11/08/24    Expected End:  11/22/24            Pt will tolerate 15 reps of each LE exercise in order to improve strength and endurance.         Start:  11/08/24    Expected End:  11/22/24               PT Transfers       STG - Patient will perform bed mobility independently.       Start:  11/08/24    Expected End:  11/22/24            STG - Patient will transfer sit to and from stand with CGA and a RW.        Start:  11/08/24    Expected End:  11/22/24               Pain - Adult              Education Documentation  Body Mechanics, taught by Kimberly Wood, PT at 11/8/2024 10:13 AM.  Learner: Patient  Readiness: Acceptance  Method: Explanation  Response: Verbalizes Understanding    Mobility Training, taught by Kimberly Wood, PT at 11/8/2024 10:13 AM.  Learner: Patient  Readiness: Acceptance  Method: Explanation  Response: Verbalizes Understanding    Education Comments  No comments found.

## 2024-11-08 NOTE — PROGRESS NOTES
11/08/24 1318   Discharge Planning   Home or Post Acute Services Post acute facilities (Rehab/SNF/etc)   Type of Post Acute Facility Services Skilled nursing   Expected Discharge Disposition SNF     Met with patient at bedside to discuss therapy recommendations.  Patient believes there is a SNF component to [facility].  [facility] does not have SNF.  I informed patient of this information.  I gave him a SNF list printed from [facility] to review.  Asked him to review and then be able to give me 3-4 choices.  I let him know I will be back around 230 pm

## 2024-11-09 LAB
ANION GAP SERPL CALC-SCNC: 11 MMOL/L (ref 10–20)
BUN SERPL-MCNC: 12 MG/DL (ref 6–23)
CALCIUM SERPL-MCNC: 9.7 MG/DL (ref 8.6–10.3)
CHLORIDE SERPL-SCNC: 100 MMOL/L (ref 98–107)
CO2 SERPL-SCNC: 23 MMOL/L (ref 21–32)
CREAT SERPL-MCNC: 1.11 MG/DL (ref 0.5–1.3)
EGFRCR SERPLBLD CKD-EPI 2021: 73 ML/MIN/1.73M*2
ERYTHROCYTE [DISTWIDTH] IN BLOOD BY AUTOMATED COUNT: 13.4 % (ref 11.5–14.5)
GLUCOSE BLD MANUAL STRIP-MCNC: 114 MG/DL (ref 74–99)
GLUCOSE BLD MANUAL STRIP-MCNC: 124 MG/DL (ref 74–99)
GLUCOSE BLD MANUAL STRIP-MCNC: 168 MG/DL (ref 74–99)
GLUCOSE BLD MANUAL STRIP-MCNC: 90 MG/DL (ref 74–99)
GLUCOSE SERPL-MCNC: 74 MG/DL (ref 74–99)
HCT VFR BLD AUTO: 46.3 % (ref 41–52)
HGB BLD-MCNC: 14.7 G/DL (ref 13.5–17.5)
MCH RBC QN AUTO: 29.7 PG (ref 26–34)
MCHC RBC AUTO-ENTMCNC: 31.7 G/DL (ref 32–36)
MCV RBC AUTO: 94 FL (ref 80–100)
NRBC BLD-RTO: 0 /100 WBCS (ref 0–0)
OSMOLALITY UR: 405 MOSM/KG (ref 200–1200)
PLATELET # BLD AUTO: 264 X10*3/UL (ref 150–450)
POTASSIUM SERPL-SCNC: 4.1 MMOL/L (ref 3.5–5.3)
RBC # BLD AUTO: 4.95 X10*6/UL (ref 4.5–5.9)
SODIUM SERPL-SCNC: 130 MMOL/L (ref 136–145)
WBC # BLD AUTO: 10.5 X10*3/UL (ref 4.4–11.3)

## 2024-11-09 PROCEDURE — 99232 SBSQ HOSP IP/OBS MODERATE 35: CPT | Performed by: INTERNAL MEDICINE

## 2024-11-09 PROCEDURE — 82947 ASSAY GLUCOSE BLOOD QUANT: CPT

## 2024-11-09 PROCEDURE — 2500000001 HC RX 250 WO HCPCS SELF ADMINISTERED DRUGS (ALT 637 FOR MEDICARE OP): Performed by: NURSE PRACTITIONER

## 2024-11-09 PROCEDURE — 85027 COMPLETE CBC AUTOMATED: CPT | Performed by: NURSE PRACTITIONER

## 2024-11-09 PROCEDURE — 36415 COLL VENOUS BLD VENIPUNCTURE: CPT | Performed by: NURSE PRACTITIONER

## 2024-11-09 PROCEDURE — 2500000004 HC RX 250 GENERAL PHARMACY W/ HCPCS (ALT 636 FOR OP/ED): Performed by: NURSE PRACTITIONER

## 2024-11-09 PROCEDURE — 2500000004 HC RX 250 GENERAL PHARMACY W/ HCPCS (ALT 636 FOR OP/ED): Performed by: INTERNAL MEDICINE

## 2024-11-09 PROCEDURE — 2500000005 HC RX 250 GENERAL PHARMACY W/O HCPCS: Performed by: NURSE PRACTITIONER

## 2024-11-09 PROCEDURE — G0378 HOSPITAL OBSERVATION PER HR: HCPCS

## 2024-11-09 PROCEDURE — 2500000001 HC RX 250 WO HCPCS SELF ADMINISTERED DRUGS (ALT 637 FOR MEDICARE OP): Performed by: FAMILY MEDICINE

## 2024-11-09 PROCEDURE — 80048 BASIC METABOLIC PNL TOTAL CA: CPT | Performed by: NURSE PRACTITIONER

## 2024-11-09 PROCEDURE — 1100000001 HC PRIVATE ROOM DAILY

## 2024-11-09 PROCEDURE — 2500000002 HC RX 250 W HCPCS SELF ADMINISTERED DRUGS (ALT 637 FOR MEDICARE OP, ALT 636 FOR OP/ED): Performed by: NURSE PRACTITIONER

## 2024-11-09 PROCEDURE — 99233 SBSQ HOSP IP/OBS HIGH 50: CPT | Performed by: INTERNAL MEDICINE

## 2024-11-09 PROCEDURE — 2500000002 HC RX 250 W HCPCS SELF ADMINISTERED DRUGS (ALT 637 FOR MEDICARE OP, ALT 636 FOR OP/ED): Performed by: FAMILY MEDICINE

## 2024-11-09 RX ORDER — HYDROCODONE BITARTRATE AND ACETAMINOPHEN 5; 325 MG/1; MG/1
1 TABLET ORAL EVERY 6 HOURS PRN
Status: DISCONTINUED | OUTPATIENT
Start: 2024-11-09 | End: 2024-11-20 | Stop reason: HOSPADM

## 2024-11-09 RX ORDER — METHOCARBAMOL 500 MG/1
500 TABLET, FILM COATED ORAL EVERY 8 HOURS SCHEDULED
Status: DISCONTINUED | OUTPATIENT
Start: 2024-11-09 | End: 2024-11-20 | Stop reason: HOSPADM

## 2024-11-09 RX ADMIN — INSULIN GLARGINE 44 UNITS: 100 INJECTION, SOLUTION SUBCUTANEOUS at 20:20

## 2024-11-09 RX ADMIN — PREGABALIN 200 MG: 100 CAPSULE ORAL at 20:22

## 2024-11-09 RX ADMIN — ASPIRIN 81 MG: 81 TABLET, COATED ORAL at 09:09

## 2024-11-09 RX ADMIN — METHOCARBAMOL TABLETS 500 MG: 500 TABLET, COATED ORAL at 15:13

## 2024-11-09 RX ADMIN — TRIHEXYPHENIDYL HYDROCHLORIDE 2 MG: 2 TABLET ORAL at 09:09

## 2024-11-09 RX ADMIN — METHOCARBAMOL TABLETS 500 MG: 500 TABLET, COATED ORAL at 21:10

## 2024-11-09 RX ADMIN — QUETIAPINE FUMARATE 25 MG: 25 TABLET ORAL at 20:22

## 2024-11-09 RX ADMIN — LITHIUM CARBONATE 300 MG: 300 TABLET, EXTENDED RELEASE ORAL at 09:09

## 2024-11-09 RX ADMIN — Medication 1000 MCG: at 09:09

## 2024-11-09 RX ADMIN — PANTOPRAZOLE SODIUM 20 MG: 40 TABLET, DELAYED RELEASE ORAL at 06:01

## 2024-11-09 RX ADMIN — ATORVASTATIN CALCIUM 80 MG: 80 TABLET, FILM COATED ORAL at 09:09

## 2024-11-09 RX ADMIN — INSULIN LISPRO 6 UNITS: 100 INJECTION, SOLUTION INTRAVENOUS; SUBCUTANEOUS at 13:20

## 2024-11-09 RX ADMIN — Medication 1 TABLET: at 20:22

## 2024-11-09 RX ADMIN — SODIUM CHLORIDE 500 ML: 9 INJECTION, SOLUTION INTRAVENOUS at 11:53

## 2024-11-09 RX ADMIN — TRAZODONE HYDROCHLORIDE 50 MG: 50 TABLET ORAL at 20:22

## 2024-11-09 RX ADMIN — LIDOCAINE 4% 1 PATCH: 40 PATCH TOPICAL at 09:08

## 2024-11-09 RX ADMIN — INSULIN LISPRO 6 UNITS: 100 INJECTION, SOLUTION INTRAVENOUS; SUBCUTANEOUS at 17:19

## 2024-11-09 RX ADMIN — TRIHEXYPHENIDYL HYDROCHLORIDE 2 MG: 2 TABLET ORAL at 17:20

## 2024-11-09 RX ADMIN — TAMSULOSIN HYDROCHLORIDE 0.4 MG: 0.4 CAPSULE ORAL at 09:09

## 2024-11-09 RX ADMIN — ACETAMINOPHEN 325MG 650 MG: 325 TABLET ORAL at 20:22

## 2024-11-09 RX ADMIN — PREGABALIN 200 MG: 100 CAPSULE ORAL at 09:09

## 2024-11-09 RX ADMIN — DULOXETINE HYDROCHLORIDE 60 MG: 30 CAPSULE, DELAYED RELEASE ORAL at 09:09

## 2024-11-09 RX ADMIN — LIDOCAINE 4% 1 PATCH: 40 PATCH TOPICAL at 09:10

## 2024-11-09 RX ADMIN — ENOXAPARIN SODIUM 40 MG: 40 INJECTION SUBCUTANEOUS at 17:20

## 2024-11-09 RX ADMIN — LITHIUM CARBONATE 300 MG: 300 TABLET, EXTENDED RELEASE ORAL at 20:23

## 2024-11-09 RX ADMIN — Medication 1 TABLET: at 09:09

## 2024-11-09 RX ADMIN — CEFTRIAXONE SODIUM 1 G: 1 INJECTION, SOLUTION INTRAVENOUS at 13:20

## 2024-11-09 RX ADMIN — METHOCARBAMOL TABLETS 500 MG: 500 TABLET, COATED ORAL at 09:09

## 2024-11-09 RX ADMIN — POLYETHYLENE GLYCOL 3350 17 G: 17 POWDER, FOR SOLUTION ORAL at 09:09

## 2024-11-09 RX ADMIN — ISOSORBIDE MONONITRATE 60 MG: 30 TABLET, EXTENDED RELEASE ORAL at 09:09

## 2024-11-09 RX ADMIN — ACETAMINOPHEN 325MG 650 MG: 325 TABLET ORAL at 05:58

## 2024-11-09 ASSESSMENT — PAIN SCALES - GENERAL
PAINLEVEL_OUTOF10: 0 - NO PAIN
PAINLEVEL_OUTOF10: 4
PAINLEVEL_OUTOF10: 0 - NO PAIN
PAINLEVEL_OUTOF10: 3

## 2024-11-09 ASSESSMENT — COGNITIVE AND FUNCTIONAL STATUS - GENERAL
PERSONAL GROOMING: A LITTLE
PERSONAL GROOMING: A LITTLE
MOBILITY SCORE: 18
MOVING TO AND FROM BED TO CHAIR: A LITTLE
TURNING FROM BACK TO SIDE WHILE IN FLAT BAD: A LITTLE
MOVING TO AND FROM BED TO CHAIR: A LITTLE
DAILY ACTIVITIY SCORE: 19
DRESSING REGULAR UPPER BODY CLOTHING: A LITTLE
MOVING FROM LYING ON BACK TO SITTING ON SIDE OF FLAT BED WITH BEDRAILS: A LITTLE
WALKING IN HOSPITAL ROOM: A LITTLE
TOILETING: A LITTLE
CLIMB 3 TO 5 STEPS WITH RAILING: A LITTLE
MOVING FROM LYING ON BACK TO SITTING ON SIDE OF FLAT BED WITH BEDRAILS: A LITTLE
TURNING FROM BACK TO SIDE WHILE IN FLAT BAD: A LITTLE
DAILY ACTIVITIY SCORE: 19
DRESSING REGULAR LOWER BODY CLOTHING: A LITTLE
MOBILITY SCORE: 18
DRESSING REGULAR UPPER BODY CLOTHING: A LITTLE
TOILETING: A LITTLE
HELP NEEDED FOR BATHING: A LITTLE
CLIMB 3 TO 5 STEPS WITH RAILING: A LITTLE
STANDING UP FROM CHAIR USING ARMS: A LITTLE
WALKING IN HOSPITAL ROOM: A LITTLE
DRESSING REGULAR LOWER BODY CLOTHING: A LITTLE
STANDING UP FROM CHAIR USING ARMS: A LITTLE
HELP NEEDED FOR BATHING: A LITTLE

## 2024-11-09 ASSESSMENT — PAIN DESCRIPTION - ORIENTATION: ORIENTATION: LEFT;RIGHT

## 2024-11-09 ASSESSMENT — PAIN DESCRIPTION - LOCATION
LOCATION: BACK
LOCATION: BUTTOCKS

## 2024-11-09 ASSESSMENT — PAIN - FUNCTIONAL ASSESSMENT: PAIN_FUNCTIONAL_ASSESSMENT: 0-10

## 2024-11-09 NOTE — PROGRESS NOTES
Nephrology Consult Progress Note    Admit Date: 11/7/2024    Interval history:  [place]    CURRENT MEDICATIONS:    Current Facility-Administered Medications:     acetaminophen (Tylenol) tablet 650 mg, 650 mg, oral, q4h PRN, 650 mg at 11/09/24 0558 **OR** acetaminophen (Tylenol) oral liquid 650 mg, 650 mg, nasogastric tube, q4h PRN **OR** acetaminophen (Tylenol) suppository 650 mg, 650 mg, rectal, q4h PRN, DARIEN Sousa-CNP    aspirin EC tablet 81 mg, 81 mg, oral, Daily, Shiv De La Rosa MD, 81 mg at 11/09/24 0909    atorvastatin (Lipitor) tablet 80 mg, 80 mg, oral, Daily, Shiv De La Rosa MD, 80 mg at 11/09/24 0909    calcium carbonate-vitamin D3 500 mg-5 mcg (200 unit) per tablet 1 tablet, 1 tablet, oral, BID, Shiv De La Rosa MD, 1 tablet at 11/09/24 0909    cefTRIAXone (Rocephin) 1 g in dextrose (iso) IV 50 mL, 1 g, intravenous, q24h, DARIEN Sousa-CNP, Stopped at 11/08/24 1352    cyanocobalamin (Vitamin B-12) tablet 1,000 mcg, 1,000 mcg, oral, Daily, Shiv De La Rosa MD, 1,000 mcg at 11/09/24 0909    DULoxetine (Cymbalta) DR capsule 60 mg, 60 mg, oral, Daily, Shiv De La Rosa MD, 60 mg at 11/09/24 0909    enoxaparin (Lovenox) syringe 40 mg, 40 mg, subcutaneous, q24h, DARIEN Sousa-CNP, 40 mg at 11/08/24 1658    HYDROcodone-acetaminophen (Norco) 5-325 mg per tablet 1 tablet, 1 tablet, oral, q6h PRN, Shiv De La Rosa MD    insulin glargine (Lantus) injection 44 Units, 44 Units, subcutaneous, Nightly, Shiv De La Rosa MD, 44 Units at 11/08/24 6543    insulin lispro injection 6 Units, 6 Units, subcutaneous, TID, Shiv De La Rosa MD    isosorbide mononitrate ER (Imdur) 24 hr tablet 60 mg, 60 mg, oral, Daily, Shiv De La Rosa MD, 60 mg at 11/09/24 0909    lidocaine 4 % patch 1 patch, 1 patch, transdermal, Daily, PARI Sousa, 1 patch at 11/09/24 0910    lidocaine 4 % patch 1 patch, 1 patch, transdermal, Daily, PARI Sousa, 1 patch at 11/09/24 0908    lithium ER (Lithobid) extended release tablet 300 mg,  "300 mg, oral, BID, PARI Sousa, 300 mg at 11/09/24 0909    methocarbamol (Robaxin) tablet 500 mg, 500 mg, oral, q8h VILMA, Shiv De La Rosa MD, 500 mg at 11/09/24 0909    ondansetron ODT (Zofran-ODT) disintegrating tablet 4 mg, 4 mg, oral, Once, PARI Sousa    pantoprazole (ProtoNix) EC tablet 20 mg, 20 mg, oral, Daily before breakfast, Shiv De La Rosa MD, 20 mg at 11/09/24 0601    polyethylene glycol (Glycolax, Miralax) packet 17 g, 17 g, oral, Daily, PARI Sousa, 17 g at 11/09/24 0909    pregabalin (Lyrica) capsule 200 mg, 200 mg, oral, BID, Shiv De La Rosa MD, 200 mg at 11/09/24 0909    QUEtiapine (SEROquel) tablet 25 mg, 25 mg, oral, Nightly, PARI Sousa, 25 mg at 11/08/24 2153    sodium chloride 0.9 % bolus 500 mL, 500 mL, intravenous, Once, Zaid Rubin MD    tamsulosin (Flomax) 24 hr capsule 0.4 mg, 0.4 mg, oral, Daily, PARI Sousa, 0.4 mg at 11/09/24 0909    traZODone (Desyrel) tablet 50 mg, 50 mg, oral, Nightly, PARI Sousa, 50 mg at 11/08/24 2153    trihexyphenidyl (Artane) tablet 2 mg, 2 mg, oral, BID, PARI Sousa, 2 mg at 11/09/24 0909       Intake/Output Summary (Last 24 hours) at 11/9/2024 1128  Last data filed at 11/9/2024 0355  Gross per 24 hour   Intake 500 ml   Output 150 ml   Net 350 ml       PHYSICAL EXAM:  /76 (BP Location: Left arm, Patient Position: Lying)   Pulse 89   Temp 37.1 °C (98.7 °F) (Oral)   Resp 18   Ht 1.753 m (5' 9\")   Wt 83.9 kg (185 lb)   SpO2 98%   BMI 27.32 kg/m²     Intake/Output Summary (Last 24 hours) at 11/9/2024 1128  Last data filed at 11/9/2024 0355  Gross per 24 hour   Intake 500 ml   Output 150 ml   Net 350 ml     Gen: Awake, NAD  Neck: No JVD  Cardiac: RRR  Resp: decrease BS  Abd: Soft, non tender, +BS, non distended   Ext: No edema   Neuro: moves 4 ext  Peripheral Pulses: Capillary refill <2secs, strong peripheral pulses.  Skin: Skin color, texture, turgor normal, no suspicious rashes or " lesions.    Labs:  Results for orders placed or performed during the hospital encounter of 11/07/24 (from the past 24 hours)   POCT GLUCOSE   Result Value Ref Range    POCT Glucose 143 (H) 74 - 99 mg/dL   POCT GLUCOSE   Result Value Ref Range    POCT Glucose 105 (H) 74 - 99 mg/dL   POCT GLUCOSE   Result Value Ref Range    POCT Glucose 116 (H) 74 - 99 mg/dL   POCT GLUCOSE   Result Value Ref Range    POCT Glucose 114 (H) 74 - 99 mg/dL   Basic metabolic panel   Result Value Ref Range    Glucose 74 74 - 99 mg/dL    Sodium 130 (L) 136 - 145 mmol/L    Potassium 4.1 3.5 - 5.3 mmol/L    Chloride 100 98 - 107 mmol/L    Bicarbonate 23 21 - 32 mmol/L    Anion Gap 11 10 - 20 mmol/L    Urea Nitrogen 12 6 - 23 mg/dL    Creatinine 1.11 0.50 - 1.30 mg/dL    eGFR 73 >60 mL/min/1.73m*2    Calcium 9.7 8.6 - 10.3 mg/dL   CBC   Result Value Ref Range    WBC 10.5 4.4 - 11.3 x10*3/uL    nRBC 0.0 0.0 - 0.0 /100 WBCs    RBC 4.95 4.50 - 5.90 x10*6/uL    Hemoglobin 14.7 13.5 - 17.5 g/dL    Hematocrit 46.3 41.0 - 52.0 %    MCV 94 80 - 100 fL    MCH 29.7 26.0 - 34.0 pg    MCHC 31.7 (L) 32.0 - 36.0 g/dL    RDW 13.4 11.5 - 14.5 %    Platelets 264 150 - 450 x10*3/uL   POCT GLUCOSE   Result Value Ref Range    POCT Glucose 90 74 - 99 mg/dL        DATA:   Diagnostic tests reviewed for today's visit:    New labs and imaging   BMP, CBC, urine lytes seen    Assessment and Plan:  h/o Emphysema lung, GERD, HLD, Hypertension, essential, Low back pain, PVD, Schizophrenia, and Type 2 diabetes mellitus coming after fall staying on the grown for dome time  - Hypo-osmolar Hyponatremia: new onset, hypotensive on admission, Isaura low 14, Uosm 275, ADH induce due to hypovolemia  Na better up to 130 with NS bolus   Also some of his meds could be related to hyponatremia, such seroquel, trazadone and cymbalta, but not new to him     PLAN;  - 500 ml NS bolus today, will follow labs     Will continue to follow.   Time spent on consult progress: 45 min    Signature:  Zaid Rubin MD

## 2024-11-09 NOTE — PROGRESS NOTES
Brannon Engel is a 69 y.o. male     Patient complains of suprapubic/hip pain    Review of Systems           Vitals:    11/09/24 1210   BP: 119/77   Pulse: 83   Resp: 18   Temp: 37.2 °C (98.9 °F)   SpO2: 100%        Scheduled medications  aspirin, 81 mg, oral, Daily  atorvastatin, 80 mg, oral, Daily  calcium carbonate-vitamin D3, 1 tablet, oral, BID  cefTRIAXone, 1 g, intravenous, q24h  cyanocobalamin, 1,000 mcg, oral, Daily  DULoxetine, 60 mg, oral, Daily  enoxaparin, 40 mg, subcutaneous, q24h  insulin glargine, 44 Units, subcutaneous, Nightly  insulin lispro, 6 Units, subcutaneous, TID  isosorbide mononitrate ER, 60 mg, oral, Daily  lidocaine, 1 patch, transdermal, Daily  lidocaine, 1 patch, transdermal, Daily  lithium ER, 300 mg, oral, BID  methocarbamol, 500 mg, oral, q8h VILMA  ondansetron ODT, 4 mg, oral, Once  pantoprazole, 20 mg, oral, Daily before breakfast  polyethylene glycol, 17 g, oral, Daily  pregabalin, 200 mg, oral, BID  QUEtiapine, 25 mg, oral, Nightly  sodium chloride, 500 mL, intravenous, Once  tamsulosin, 0.4 mg, oral, Daily  traZODone, 50 mg, oral, Nightly  trihexyphenidyl, 2 mg, oral, BID      Continuous medications     PRN medications  PRN medications: acetaminophen **OR** acetaminophen **OR** acetaminophen, HYDROcodone-acetaminophen    Lab Review   Results from last 7 days   Lab Units 11/09/24  0509 11/08/24  0536 11/07/24  1318   WBC AUTO x10*3/uL 10.5 11.5* 15.5*   HEMOGLOBIN g/dL 14.7 14.0 14.6   HEMATOCRIT % 46.3 42.8 44.7   PLATELETS AUTO x10*3/uL 264 265 290     Results from last 7 days   Lab Units 11/09/24  0509 11/08/24  0536 11/07/24  1318   SODIUM mmol/L 130* 127* 129*   POTASSIUM mmol/L 4.1 3.5 5.0   CHLORIDE mmol/L 100 98 99   CO2 mmol/L 23 19* 24   BUN mg/dL 12 12 9   CREATININE mg/dL 1.11 1.14 1.08   CALCIUM mg/dL 9.7 9.7 10.0   PROTEIN TOTAL g/dL  --   --  7.1   BILIRUBIN TOTAL mg/dL  --   --  0.6   ALK PHOS U/L  --   --  99   ALT U/L  --   --  25   AST U/L  --   --  37   GLUCOSE  mg/dL 74 127* 90            CT pelvis wo IV contrast   Final Result   No acute fracture or suspicious osseous lesions.   Mild osteoarthritis of the right hip.   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        Signed by: Cali Martinez 11/8/2024 3:59 PM   Dictation workstation:   [id number]      XR hip right with pelvis when performed 2 or 3 views   Final Result   Intact total left hip arthroplasty. Degenerative changes of the right   hip joint. No acute fracture or subluxation.        Signed by: Brendan Taylor 11/7/2024 11:42 AM   Dictation workstation:   [id number]      CT head wo IV contrast   Final Result   No evidence of acute cortical infarct or intracranial hemorrhage.        Chronic changes as described.        MACRO:   None        Signed by: Dagoberto Trejo 11/7/2024 11:14 AM   Dictation workstation:   [id number]      CT cervical spine wo IV contrast   Final Result   No evidence for an acute fracture or subluxation of the cervical   spine.        MACRO:   None        Signed by: Dagoberto Trejo 11/7/2024 11:34 AM   Dictation workstation:   [id number]            Physical Exam    Constitutional   General appearance: Alert and in no acute distress.   Pulmonary   Respiratory assessment: No respiratory distress, normal respiratory rhythm and effort.    Auscultation of Lungs: Clear bilateral breath sounds.   Cardiovascular   Auscultation of heart: Apical pulse normal, heart rate and rhythm normal, normal S1 and S2, no murmurs and no pericardial rub.    Exam for edema: No peripheral edema.   Abdomen   Abdominal Exam: No bruits, normal bowel sounds, soft, non-tender, no abdominal mass palpated.    Liver and Spleen exam: No hepato-splenomegaly.   Musculoskeletal   Moves all extremities  Neurologic   Cranial nerves: Nerves 2-12 were intact, no focal neuro defects.         Assessment/Plan      #Urinary tract infection  Continue antibiotics    #Hyponatremia  Improving  numbers  Getting another fluid bolus    #Chronic hip pain  CT rule out fractures and shows mild osteoarthritis    #Schizophrenia  Continue home medications    #Diabetes mellitus  Sliding scale insulin coverage    #Hypertension  Stable continue home medications

## 2024-11-09 NOTE — CARE PLAN
The patient's goals for the shift include      The clinical goals for the shift include pt will remain free from fall and injury

## 2024-11-09 NOTE — PROGRESS NOTES
"   11/09/24 0819   Discharge Planning   Expected Discharge Disposition SNF     I met with this patient at his bedside to discuss discharge planning, patient was somewhat confused when I asked him about receiving a list for facilities in regards to SNF placement he stated he did not receive one, than patient stated he did receive a list, when I asked him where he would like me to send referrals to he stated \" I'll get it together.\" I did call the patient's daughter at [phone] and spoke to Nathan in regards to discharge planning, I sent her a referral list to [email or web address] to review. I will continue to monitor for discharge planning and home going needs.  "

## 2024-11-09 NOTE — CARE PLAN
The patient's goals for the shift include      The clinical goals for the shift include Pt will remain HDS throughout shift

## 2024-11-10 VITALS
DIASTOLIC BLOOD PRESSURE: 65 MMHG | OXYGEN SATURATION: 96 % | HEIGHT: 69 IN | SYSTOLIC BLOOD PRESSURE: 119 MMHG | BODY MASS INDEX: 27.4 KG/M2 | WEIGHT: 185 LBS | RESPIRATION RATE: 18 BRPM | HEART RATE: 66 BPM | TEMPERATURE: 98.6 F

## 2024-11-10 LAB
ANION GAP SERPL CALC-SCNC: 11 MMOL/L (ref 10–20)
BACTERIA UR CULT: ABNORMAL
BUN SERPL-MCNC: 10 MG/DL (ref 6–23)
CALCIUM SERPL-MCNC: 9 MG/DL (ref 8.6–10.3)
CHLORIDE SERPL-SCNC: 109 MMOL/L (ref 98–107)
CO2 SERPL-SCNC: 21 MMOL/L (ref 21–32)
CREAT SERPL-MCNC: 0.97 MG/DL (ref 0.5–1.3)
EGFRCR SERPLBLD CKD-EPI 2021: 86 ML/MIN/1.73M*2
ERYTHROCYTE [DISTWIDTH] IN BLOOD BY AUTOMATED COUNT: 13.2 % (ref 11.5–14.5)
GLUCOSE BLD MANUAL STRIP-MCNC: 104 MG/DL (ref 74–99)
GLUCOSE BLD MANUAL STRIP-MCNC: 110 MG/DL (ref 74–99)
GLUCOSE BLD MANUAL STRIP-MCNC: 120 MG/DL (ref 74–99)
GLUCOSE BLD MANUAL STRIP-MCNC: 125 MG/DL (ref 74–99)
GLUCOSE SERPL-MCNC: 93 MG/DL (ref 74–99)
HCT VFR BLD AUTO: 43.4 % (ref 41–52)
HGB BLD-MCNC: 14.1 G/DL (ref 13.5–17.5)
MCH RBC QN AUTO: 30.6 PG (ref 26–34)
MCHC RBC AUTO-ENTMCNC: 32.5 G/DL (ref 32–36)
MCV RBC AUTO: 94 FL (ref 80–100)
NRBC BLD-RTO: 0 /100 WBCS (ref 0–0)
PLATELET # BLD AUTO: 267 X10*3/UL (ref 150–450)
POTASSIUM SERPL-SCNC: 3.9 MMOL/L (ref 3.5–5.3)
RBC # BLD AUTO: 4.61 X10*6/UL (ref 4.5–5.9)
SODIUM SERPL-SCNC: 137 MMOL/L (ref 136–145)
WBC # BLD AUTO: 10.6 X10*3/UL (ref 4.4–11.3)

## 2024-11-10 PROCEDURE — 80048 BASIC METABOLIC PNL TOTAL CA: CPT | Performed by: NURSE PRACTITIONER

## 2024-11-10 PROCEDURE — 99232 SBSQ HOSP IP/OBS MODERATE 35: CPT | Performed by: INTERNAL MEDICINE

## 2024-11-10 PROCEDURE — G0378 HOSPITAL OBSERVATION PER HR: HCPCS

## 2024-11-10 PROCEDURE — 85027 COMPLETE CBC AUTOMATED: CPT | Performed by: NURSE PRACTITIONER

## 2024-11-10 PROCEDURE — 36415 COLL VENOUS BLD VENIPUNCTURE: CPT | Performed by: NURSE PRACTITIONER

## 2024-11-10 PROCEDURE — 2500000001 HC RX 250 WO HCPCS SELF ADMINISTERED DRUGS (ALT 637 FOR MEDICARE OP): Performed by: NURSE PRACTITIONER

## 2024-11-10 PROCEDURE — 2500000005 HC RX 250 GENERAL PHARMACY W/O HCPCS: Performed by: NURSE PRACTITIONER

## 2024-11-10 PROCEDURE — 82947 ASSAY GLUCOSE BLOOD QUANT: CPT

## 2024-11-10 PROCEDURE — 2500000002 HC RX 250 W HCPCS SELF ADMINISTERED DRUGS (ALT 637 FOR MEDICARE OP, ALT 636 FOR OP/ED): Performed by: NURSE PRACTITIONER

## 2024-11-10 PROCEDURE — 1100000001 HC PRIVATE ROOM DAILY

## 2024-11-10 PROCEDURE — 2500000004 HC RX 250 GENERAL PHARMACY W/ HCPCS (ALT 636 FOR OP/ED): Performed by: NURSE PRACTITIONER

## 2024-11-10 PROCEDURE — 2500000002 HC RX 250 W HCPCS SELF ADMINISTERED DRUGS (ALT 637 FOR MEDICARE OP, ALT 636 FOR OP/ED): Performed by: FAMILY MEDICINE

## 2024-11-10 PROCEDURE — 2500000001 HC RX 250 WO HCPCS SELF ADMINISTERED DRUGS (ALT 637 FOR MEDICARE OP): Performed by: FAMILY MEDICINE

## 2024-11-10 RX ADMIN — LIDOCAINE 4% 1 PATCH: 40 PATCH TOPICAL at 09:27

## 2024-11-10 RX ADMIN — LITHIUM CARBONATE 300 MG: 300 TABLET, EXTENDED RELEASE ORAL at 09:27

## 2024-11-10 RX ADMIN — POLYETHYLENE GLYCOL 3350 17 G: 17 POWDER, FOR SOLUTION ORAL at 09:27

## 2024-11-10 RX ADMIN — Medication 1 TABLET: at 20:12

## 2024-11-10 RX ADMIN — ASPIRIN 81 MG: 81 TABLET, COATED ORAL at 09:27

## 2024-11-10 RX ADMIN — ACETAMINOPHEN 325MG 650 MG: 325 TABLET ORAL at 20:12

## 2024-11-10 RX ADMIN — LITHIUM CARBONATE 300 MG: 300 TABLET, EXTENDED RELEASE ORAL at 20:12

## 2024-11-10 RX ADMIN — ENOXAPARIN SODIUM 40 MG: 40 INJECTION SUBCUTANEOUS at 17:23

## 2024-11-10 RX ADMIN — QUETIAPINE FUMARATE 25 MG: 25 TABLET ORAL at 20:12

## 2024-11-10 RX ADMIN — LIDOCAINE 4% 1 PATCH: 40 PATCH TOPICAL at 09:29

## 2024-11-10 RX ADMIN — INSULIN LISPRO 6 UNITS: 100 INJECTION, SOLUTION INTRAVENOUS; SUBCUTANEOUS at 17:22

## 2024-11-10 RX ADMIN — TRAZODONE HYDROCHLORIDE 50 MG: 50 TABLET ORAL at 20:12

## 2024-11-10 RX ADMIN — Medication 1000 MCG: at 09:27

## 2024-11-10 RX ADMIN — Medication 1 TABLET: at 09:27

## 2024-11-10 RX ADMIN — ATORVASTATIN CALCIUM 80 MG: 80 TABLET, FILM COATED ORAL at 09:27

## 2024-11-10 RX ADMIN — TRIHEXYPHENIDYL HYDROCHLORIDE 2 MG: 2 TABLET ORAL at 17:35

## 2024-11-10 RX ADMIN — PANTOPRAZOLE SODIUM 20 MG: 40 TABLET, DELAYED RELEASE ORAL at 05:23

## 2024-11-10 RX ADMIN — TAMSULOSIN HYDROCHLORIDE 0.4 MG: 0.4 CAPSULE ORAL at 09:27

## 2024-11-10 RX ADMIN — PREGABALIN 200 MG: 100 CAPSULE ORAL at 20:12

## 2024-11-10 RX ADMIN — METHOCARBAMOL TABLETS 500 MG: 500 TABLET, COATED ORAL at 14:21

## 2024-11-10 RX ADMIN — METHOCARBAMOL TABLETS 500 MG: 500 TABLET, COATED ORAL at 21:36

## 2024-11-10 RX ADMIN — INSULIN LISPRO 6 UNITS: 100 INJECTION, SOLUTION INTRAVENOUS; SUBCUTANEOUS at 11:42

## 2024-11-10 RX ADMIN — TRIHEXYPHENIDYL HYDROCHLORIDE 2 MG: 2 TABLET ORAL at 09:38

## 2024-11-10 RX ADMIN — PREGABALIN 200 MG: 100 CAPSULE ORAL at 09:27

## 2024-11-10 RX ADMIN — CEFTRIAXONE SODIUM 1 G: 1 INJECTION, SOLUTION INTRAVENOUS at 12:12

## 2024-11-10 RX ADMIN — METHOCARBAMOL TABLETS 500 MG: 500 TABLET, COATED ORAL at 05:23

## 2024-11-10 RX ADMIN — ISOSORBIDE MONONITRATE 60 MG: 30 TABLET, EXTENDED RELEASE ORAL at 09:27

## 2024-11-10 RX ADMIN — DULOXETINE HYDROCHLORIDE 60 MG: 30 CAPSULE, DELAYED RELEASE ORAL at 09:27

## 2024-11-10 RX ADMIN — INSULIN GLARGINE 44 UNITS: 100 INJECTION, SOLUTION SUBCUTANEOUS at 20:15

## 2024-11-10 ASSESSMENT — COGNITIVE AND FUNCTIONAL STATUS - GENERAL
STANDING UP FROM CHAIR USING ARMS: A LITTLE
MOVING FROM LYING ON BACK TO SITTING ON SIDE OF FLAT BED WITH BEDRAILS: A LITTLE
MOVING TO AND FROM BED TO CHAIR: A LITTLE
TOILETING: A LITTLE
DAILY ACTIVITIY SCORE: 18
HELP NEEDED FOR BATHING: A LITTLE
TURNING FROM BACK TO SIDE WHILE IN FLAT BAD: A LITTLE
EATING MEALS: A LITTLE
DRESSING REGULAR UPPER BODY CLOTHING: A LITTLE
DRESSING REGULAR LOWER BODY CLOTHING: A LITTLE
WALKING IN HOSPITAL ROOM: A LITTLE
CLIMB 3 TO 5 STEPS WITH RAILING: A LITTLE
PERSONAL GROOMING: A LITTLE
MOBILITY SCORE: 18

## 2024-11-10 ASSESSMENT — PAIN SCALES - GENERAL
PAINLEVEL_OUTOF10: 3
PAINLEVEL_OUTOF10: 0 - NO PAIN
PAINLEVEL_OUTOF10: 1

## 2024-11-10 ASSESSMENT — PAIN DESCRIPTION - LOCATION: LOCATION: BACK

## 2024-11-10 NOTE — CARE PLAN
The patient's goals for the shift include  Yadira rest    The clinical goals for the shift include safety      Problem: Pain - Adult  Goal: Verbalizes/displays adequate comfort level or baseline comfort level  Outcome: Progressing     Problem: Safety - Adult  Goal: Free from fall injury  Outcome: Progressing     Problem: Discharge Planning  Goal: Discharge to home or other facility with appropriate resources  Outcome: Progressing     Problem: Chronic Conditions and Co-morbidities  Goal: Patient's chronic conditions and co-morbidity symptoms are monitored and maintained or improved  Outcome: Progressing     Problem: Fall/Injury  Goal: Not fall by end of shift  Outcome: Progressing  Goal: Be free from injury by end of the shift  Outcome: Progressing  Goal: Verbalize understanding of personal risk factors for fall in the hospital  Outcome: Progressing  Goal: Verbalize understanding of risk factor reduction measures to prevent injury from fall in the home  Outcome: Progressing  Goal: Use assistive devices by end of the shift  Outcome: Progressing  Goal: Pace activities to prevent fatigue by end of the shift  Outcome: Progressing     Problem: Pain  Goal: Takes deep breaths with improved pain control throughout the shift  Outcome: Progressing  Goal: Turns in bed with improved pain control throughout the shift  Outcome: Progressing  Goal: Walks with improved pain control throughout the shift  Outcome: Progressing  Goal: Performs ADL's with improved pain control throughout shift  Outcome: Progressing  Goal: Participates in PT with improved pain control throughout the shift  Outcome: Progressing  Goal: Free from opioid side effects throughout the shift  Outcome: Progressing  Goal: Free from acute confusion related to pain meds throughout the shift  Outcome: Progressing     Problem: Skin  Goal: Decreased wound size/increased tissue granulation at next dressing change  Outcome: Progressing  Goal: Participates in  plan/prevention/treatment measures  Outcome: Progressing  Goal: Prevent/manage excess moisture  Outcome: Progressing  Goal: Prevent/minimize sheer/friction injuries  Outcome: Progressing  Goal: Promote/optimize nutrition  Outcome: Progressing  Goal: Promote skin healing  Outcome: Progressing

## 2024-11-10 NOTE — PROGRESS NOTES
Brannon Engel is a 69 y.o. male     Used to complain of pain  We will get PT OT consulted  Continue pain medications as before    Review of Systems           Vitals:    11/10/24 0810   BP: 123/82   Pulse: 96   Resp: 18   Temp: 36.7 °C (98 °F)   SpO2: 99%        Scheduled medications  aspirin, 81 mg, oral, Daily  atorvastatin, 80 mg, oral, Daily  calcium carbonate-vitamin D3, 1 tablet, oral, BID  cefTRIAXone, 1 g, intravenous, q24h  cyanocobalamin, 1,000 mcg, oral, Daily  DULoxetine, 60 mg, oral, Daily  enoxaparin, 40 mg, subcutaneous, q24h  insulin glargine, 44 Units, subcutaneous, Nightly  insulin lispro, 6 Units, subcutaneous, TID  isosorbide mononitrate ER, 60 mg, oral, Daily  lidocaine, 1 patch, transdermal, Daily  lidocaine, 1 patch, transdermal, Daily  lithium ER, 300 mg, oral, BID  methocarbamol, 500 mg, oral, q8h VILMA  ondansetron ODT, 4 mg, oral, Once  pantoprazole, 20 mg, oral, Daily before breakfast  polyethylene glycol, 17 g, oral, Daily  pregabalin, 200 mg, oral, BID  QUEtiapine, 25 mg, oral, Nightly  tamsulosin, 0.4 mg, oral, Daily  traZODone, 50 mg, oral, Nightly  trihexyphenidyl, 2 mg, oral, BID      Continuous medications     PRN medications  PRN medications: acetaminophen **OR** acetaminophen **OR** acetaminophen, HYDROcodone-acetaminophen    Lab Review   Results from last 7 days   Lab Units 11/10/24  0507 11/09/24  0509 11/08/24  0536   WBC AUTO x10*3/uL 10.6 10.5 11.5*   HEMOGLOBIN g/dL 14.1 14.7 14.0   HEMATOCRIT % 43.4 46.3 42.8   PLATELETS AUTO x10*3/uL 267 264 265     Results from last 7 days   Lab Units 11/10/24  0507 11/09/24  0509 11/08/24  0536 11/07/24  1318   SODIUM mmol/L 137 130* 127* 129*   POTASSIUM mmol/L 3.9 4.1 3.5 5.0   CHLORIDE mmol/L 109* 100 98 99   CO2 mmol/L 21 23 19* 24   BUN mg/dL 10 12 12 9   CREATININE mg/dL 0.97 1.11 1.14 1.08   CALCIUM mg/dL 9.0 9.7 9.7 10.0   PROTEIN TOTAL g/dL  --   --   --  7.1   BILIRUBIN TOTAL mg/dL  --   --   --  0.6   ALK PHOS U/L  --   --   --   99   ALT U/L  --   --   --  25   AST U/L  --   --   --  37   GLUCOSE mg/dL 93 74 127* 90            CT pelvis wo IV contrast   Final Result   No acute fracture or suspicious osseous lesions.   Mild osteoarthritis of the right hip.   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        Signed by: Cali Martinez 11/8/2024 3:59 PM   Dictation workstation:   [id number]      XR hip right with pelvis when performed 2 or 3 views   Final Result   Intact total left hip arthroplasty. Degenerative changes of the right   hip joint. No acute fracture or subluxation.        Signed by: Brendan Taylor 11/7/2024 11:42 AM   Dictation workstation:   [id number]      CT head wo IV contrast   Final Result   No evidence of acute cortical infarct or intracranial hemorrhage.        Chronic changes as described.        MACRO:   None        Signed by: Dagoberto Trejo 11/7/2024 11:14 AM   Dictation workstation:   [id number]      CT cervical spine wo IV contrast   Final Result   No evidence for an acute fracture or subluxation of the cervical   spine.        MACRO:   None        Signed by: Dagoberto Trejo 11/7/2024 11:34 AM   Dictation workstation:   [id number]            Physical Exam    Constitutional   General appearance: Alert and in no acute distress.   Pulmonary   Respiratory assessment: No respiratory distress, normal respiratory rhythm and effort.    Auscultation of Lungs: Clear bilateral breath sounds.   Cardiovascular   Auscultation of heart: Apical pulse normal, heart rate and rhythm normal, normal S1 and S2, no murmurs and no pericardial rub.    Exam for edema: No peripheral edema.   Abdomen   Abdominal Exam: No bruits, normal bowel sounds, soft, non-tender, no abdominal mass palpated.    Liver and Spleen exam: No hepato-splenomegaly.   Musculoskeletal   Moves all extremities  Neurologic   Cranial nerves: Nerves 2-12 were intact, no focal neuro defects.         Assessment/Plan      #Urinary  tract infection  Continue antibiotics    #Hyponatremia  Has resolved    #Chronic hip pain  From chronic osteoarthritis  Lidocaine patch/pain control  PT OT consulted    #Schizophrenia  Continue home medications    #Diabetes mellitus  Sliding scale insulin coverage    #Hypertension  Stable continue home medications

## 2024-11-10 NOTE — PROGRESS NOTES
11/10/24 1221   Discharge Planning   Expected Discharge Disposition SNF     T[facility] spoke with pt dtr Cherry Agarwal of [facility] is foc. Need updated PT OT notes to start auth.

## 2024-11-11 LAB
GLUCOSE BLD MANUAL STRIP-MCNC: 101 MG/DL (ref 74–99)
GLUCOSE BLD MANUAL STRIP-MCNC: 107 MG/DL (ref 74–99)
GLUCOSE BLD MANUAL STRIP-MCNC: 154 MG/DL (ref 74–99)
GLUCOSE BLD MANUAL STRIP-MCNC: 93 MG/DL (ref 74–99)

## 2024-11-11 PROCEDURE — 2500000001 HC RX 250 WO HCPCS SELF ADMINISTERED DRUGS (ALT 637 FOR MEDICARE OP): Performed by: NURSE PRACTITIONER

## 2024-11-11 PROCEDURE — 2500000004 HC RX 250 GENERAL PHARMACY W/ HCPCS (ALT 636 FOR OP/ED): Performed by: NURSE PRACTITIONER

## 2024-11-11 PROCEDURE — 82947 ASSAY GLUCOSE BLOOD QUANT: CPT

## 2024-11-11 PROCEDURE — G0378 HOSPITAL OBSERVATION PER HR: HCPCS

## 2024-11-11 PROCEDURE — 97530 THERAPEUTIC ACTIVITIES: CPT | Mod: GP,CQ | Performed by: PHYSICAL THERAPY ASSISTANT

## 2024-11-11 PROCEDURE — 97535 SELF CARE MNGMENT TRAINING: CPT | Mod: GO | Performed by: OCCUPATIONAL THERAPIST

## 2024-11-11 PROCEDURE — 2500000001 HC RX 250 WO HCPCS SELF ADMINISTERED DRUGS (ALT 637 FOR MEDICARE OP): Performed by: FAMILY MEDICINE

## 2024-11-11 PROCEDURE — 2500000002 HC RX 250 W HCPCS SELF ADMINISTERED DRUGS (ALT 637 FOR MEDICARE OP, ALT 636 FOR OP/ED): Performed by: NURSE PRACTITIONER

## 2024-11-11 PROCEDURE — 2500000002 HC RX 250 W HCPCS SELF ADMINISTERED DRUGS (ALT 637 FOR MEDICARE OP, ALT 636 FOR OP/ED): Performed by: FAMILY MEDICINE

## 2024-11-11 PROCEDURE — 1100000001 HC PRIVATE ROOM DAILY

## 2024-11-11 PROCEDURE — 97110 THERAPEUTIC EXERCISES: CPT | Mod: GP,CQ | Performed by: PHYSICAL THERAPY ASSISTANT

## 2024-11-11 RX ORDER — INSULIN LISPRO 100 [IU]/ML
6 INJECTION, SOLUTION INTRAVENOUS; SUBCUTANEOUS
Start: 2024-11-11

## 2024-11-11 RX ORDER — CEPHALEXIN 500 MG/1
500 CAPSULE ORAL 2 TIMES DAILY
Start: 2024-11-11 | End: 2024-11-12 | Stop reason: HOSPADM

## 2024-11-11 RX ORDER — POLYETHYLENE GLYCOL 3350 17 G/17G
17 POWDER, FOR SOLUTION ORAL DAILY
Start: 2024-11-12

## 2024-11-11 RX ORDER — LIDOCAINE 560 MG/1
1 PATCH PERCUTANEOUS; TOPICAL; TRANSDERMAL DAILY
Start: 2024-11-12

## 2024-11-11 RX ORDER — TRAZODONE HYDROCHLORIDE 50 MG/1
50 TABLET ORAL NIGHTLY
Start: 2024-11-11

## 2024-11-11 RX ORDER — ACETAMINOPHEN 325 MG/1
650 TABLET ORAL EVERY 4 HOURS PRN
Start: 2024-11-11

## 2024-11-11 RX ORDER — TAMSULOSIN HYDROCHLORIDE 0.4 MG/1
0.4 CAPSULE ORAL
Start: 2024-11-11

## 2024-11-11 RX ORDER — METHOCARBAMOL 500 MG/1
500 TABLET, FILM COATED ORAL EVERY 8 HOURS SCHEDULED
Start: 2024-11-11

## 2024-11-11 RX ADMIN — DULOXETINE HYDROCHLORIDE 60 MG: 30 CAPSULE, DELAYED RELEASE ORAL at 09:08

## 2024-11-11 RX ADMIN — ATORVASTATIN CALCIUM 80 MG: 80 TABLET, FILM COATED ORAL at 09:11

## 2024-11-11 RX ADMIN — QUETIAPINE FUMARATE 25 MG: 25 TABLET ORAL at 20:33

## 2024-11-11 RX ADMIN — PREGABALIN 200 MG: 100 CAPSULE ORAL at 20:33

## 2024-11-11 RX ADMIN — TAMSULOSIN HYDROCHLORIDE 0.4 MG: 0.4 CAPSULE ORAL at 09:08

## 2024-11-11 RX ADMIN — PANTOPRAZOLE SODIUM 20 MG: 40 TABLET, DELAYED RELEASE ORAL at 05:42

## 2024-11-11 RX ADMIN — LITHIUM CARBONATE 300 MG: 300 TABLET, EXTENDED RELEASE ORAL at 20:33

## 2024-11-11 RX ADMIN — TRIHEXYPHENIDYL HYDROCHLORIDE 2 MG: 2 TABLET ORAL at 09:07

## 2024-11-11 RX ADMIN — POLYETHYLENE GLYCOL 3350 17 G: 17 POWDER, FOR SOLUTION ORAL at 09:09

## 2024-11-11 RX ADMIN — Medication 1000 MCG: at 09:08

## 2024-11-11 RX ADMIN — Medication 1 TABLET: at 09:08

## 2024-11-11 RX ADMIN — INSULIN LISPRO 6 UNITS: 100 INJECTION, SOLUTION INTRAVENOUS; SUBCUTANEOUS at 09:11

## 2024-11-11 RX ADMIN — INSULIN LISPRO 6 UNITS: 100 INJECTION, SOLUTION INTRAVENOUS; SUBCUTANEOUS at 17:31

## 2024-11-11 RX ADMIN — ENOXAPARIN SODIUM 40 MG: 40 INJECTION SUBCUTANEOUS at 17:30

## 2024-11-11 RX ADMIN — TRIHEXYPHENIDYL HYDROCHLORIDE 2 MG: 2 TABLET ORAL at 17:30

## 2024-11-11 RX ADMIN — INSULIN LISPRO 6 UNITS: 100 INJECTION, SOLUTION INTRAVENOUS; SUBCUTANEOUS at 13:12

## 2024-11-11 RX ADMIN — ISOSORBIDE MONONITRATE 60 MG: 30 TABLET, EXTENDED RELEASE ORAL at 09:08

## 2024-11-11 RX ADMIN — TRAZODONE HYDROCHLORIDE 50 MG: 50 TABLET ORAL at 20:33

## 2024-11-11 RX ADMIN — ASPIRIN 81 MG: 81 TABLET, COATED ORAL at 09:08

## 2024-11-11 RX ADMIN — Medication 1 TABLET: at 20:33

## 2024-11-11 RX ADMIN — INSULIN GLARGINE 44 UNITS: 100 INJECTION, SOLUTION SUBCUTANEOUS at 20:33

## 2024-11-11 RX ADMIN — HYDROCODONE BITARTRATE AND ACETAMINOPHEN 1 TABLET: 5; 325 TABLET ORAL at 15:55

## 2024-11-11 RX ADMIN — HYDROCODONE BITARTRATE AND ACETAMINOPHEN 1 TABLET: 5; 325 TABLET ORAL at 09:27

## 2024-11-11 RX ADMIN — PREGABALIN 200 MG: 100 CAPSULE ORAL at 09:08

## 2024-11-11 RX ADMIN — CEFTRIAXONE SODIUM 1 G: 1 INJECTION, SOLUTION INTRAVENOUS at 13:12

## 2024-11-11 RX ADMIN — LITHIUM CARBONATE 300 MG: 300 TABLET, EXTENDED RELEASE ORAL at 09:08

## 2024-11-11 RX ADMIN — METHOCARBAMOL TABLETS 500 MG: 500 TABLET, COATED ORAL at 20:33

## 2024-11-11 RX ADMIN — METHOCARBAMOL TABLETS 500 MG: 500 TABLET, COATED ORAL at 05:42

## 2024-11-11 RX ADMIN — METHOCARBAMOL TABLETS 500 MG: 500 TABLET, COATED ORAL at 13:12

## 2024-11-11 ASSESSMENT — COGNITIVE AND FUNCTIONAL STATUS - GENERAL
MOVING FROM LYING ON BACK TO SITTING ON SIDE OF FLAT BED WITH BEDRAILS: A LOT
DRESSING REGULAR LOWER BODY CLOTHING: A LITTLE
MOBILITY SCORE: 17
TURNING FROM BACK TO SIDE WHILE IN FLAT BAD: A LITTLE
CLIMB 3 TO 5 STEPS WITH RAILING: TOTAL
PERSONAL GROOMING: A LITTLE
DRESSING REGULAR LOWER BODY CLOTHING: A LOT
MOVING TO AND FROM BED TO CHAIR: A LITTLE
CLIMB 3 TO 5 STEPS WITH RAILING: A LOT
HELP NEEDED FOR BATHING: A LITTLE
EATING MEALS: A LITTLE
PERSONAL GROOMING: A LITTLE
TOILETING: A LOT
MOVING TO AND FROM BED TO CHAIR: A LOT
DRESSING REGULAR UPPER BODY CLOTHING: A LITTLE
MOVING FROM LYING ON BACK TO SITTING ON SIDE OF FLAT BED WITH BEDRAILS: A LITTLE
PERSONAL GROOMING: A LITTLE
DAILY ACTIVITIY SCORE: 19
TURNING FROM BACK TO SIDE WHILE IN FLAT BAD: A LOT
TOILETING: A LITTLE
STANDING UP FROM CHAIR USING ARMS: A LOT
TOILETING: A LITTLE
HELP NEEDED FOR BATHING: A LOT
MOBILITY SCORE: 11
DRESSING REGULAR LOWER BODY CLOTHING: A LITTLE
HELP NEEDED FOR BATHING: A LITTLE
TURNING FROM BACK TO SIDE WHILE IN FLAT BAD: A LITTLE
DAILY ACTIVITIY SCORE: 19
WALKING IN HOSPITAL ROOM: A LITTLE
DAILY ACTIVITIY SCORE: 15
STANDING UP FROM CHAIR USING ARMS: A LITTLE
DRESSING REGULAR UPPER BODY CLOTHING: A LITTLE
DRESSING REGULAR UPPER BODY CLOTHING: A LITTLE
WALKING IN HOSPITAL ROOM: A LITTLE
MOBILITY SCORE: 17
MOVING TO AND FROM BED TO CHAIR: A LITTLE
MOVING FROM LYING ON BACK TO SITTING ON SIDE OF FLAT BED WITH BEDRAILS: A LITTLE
WALKING IN HOSPITAL ROOM: A LOT
CLIMB 3 TO 5 STEPS WITH RAILING: A LOT
STANDING UP FROM CHAIR USING ARMS: A LITTLE

## 2024-11-11 ASSESSMENT — PAIN SCALES - GENERAL
PAINLEVEL_OUTOF10: 8
PAINLEVEL_OUTOF10: 0 - NO PAIN
PAINLEVEL_OUTOF10: 7
PAINLEVEL_OUTOF10: 8
PAINLEVEL_OUTOF10: 0 - NO PAIN
PAINLEVEL_OUTOF10: 8
PAINLEVEL_OUTOF10: 8
PAINLEVEL_OUTOF10: 6
PAINLEVEL_OUTOF10: 6
PAINLEVEL_OUTOF10: 8

## 2024-11-11 ASSESSMENT — PAIN DESCRIPTION - LOCATION
LOCATION: OTHER (COMMENT)
LOCATION: LEG

## 2024-11-11 ASSESSMENT — ACTIVITIES OF DAILY LIVING (ADL): HOME_MANAGEMENT_TIME_ENTRY: 10

## 2024-11-11 ASSESSMENT — PAIN - FUNCTIONAL ASSESSMENT
PAIN_FUNCTIONAL_ASSESSMENT: 0-10

## 2024-11-11 ASSESSMENT — PAIN DESCRIPTION - ORIENTATION
ORIENTATION: RIGHT;LEFT
ORIENTATION: RIGHT;LEFT

## 2024-11-11 NOTE — PROGRESS NOTES
Brannon Engel is a 69 y.o. male       Pt resting in bed  Nad  No complaints        Review of Systems           Vitals:    11/11/24 1140   BP: 103/71   Pulse: 94   Resp: 18   Temp: 36.7 °C (98.1 °F)   SpO2: 95%        Scheduled medications  aspirin, 81 mg, oral, Daily  atorvastatin, 80 mg, oral, Daily  calcium carbonate-vitamin D3, 1 tablet, oral, BID  cefTRIAXone, 1 g, intravenous, q24h  cyanocobalamin, 1,000 mcg, oral, Daily  DULoxetine, 60 mg, oral, Daily  enoxaparin, 40 mg, subcutaneous, q24h  insulin glargine, 44 Units, subcutaneous, Nightly  insulin lispro, 6 Units, subcutaneous, TID  isosorbide mononitrate ER, 60 mg, oral, Daily  lidocaine, 1 patch, transdermal, Daily  lidocaine, 1 patch, transdermal, Daily  lithium ER, 300 mg, oral, BID  methocarbamol, 500 mg, oral, q8h VILMA  ondansetron ODT, 4 mg, oral, Once  pantoprazole, 20 mg, oral, Daily before breakfast  polyethylene glycol, 17 g, oral, Daily  pregabalin, 200 mg, oral, BID  QUEtiapine, 25 mg, oral, Nightly  tamsulosin, 0.4 mg, oral, Daily  traZODone, 50 mg, oral, Nightly  trihexyphenidyl, 2 mg, oral, BID      Continuous medications     PRN medications  PRN medications: acetaminophen **OR** acetaminophen **OR** acetaminophen, HYDROcodone-acetaminophen    Lab Review   Results from last 7 days   Lab Units 11/10/24  0507 11/09/24  0509 11/08/24  0536   WBC AUTO x10*3/uL 10.6 10.5 11.5*   HEMOGLOBIN g/dL 14.1 14.7 14.0   HEMATOCRIT % 43.4 46.3 42.8   PLATELETS AUTO x10*3/uL 267 264 265     Results from last 7 days   Lab Units 11/10/24  0507 11/09/24  0509 11/08/24  0536 11/07/24  1318   SODIUM mmol/L 137 130* 127* 129*   POTASSIUM mmol/L 3.9 4.1 3.5 5.0   CHLORIDE mmol/L 109* 100 98 99   CO2 mmol/L 21 23 19* 24   BUN mg/dL 10 12 12 9   CREATININE mg/dL 0.97 1.11 1.14 1.08   CALCIUM mg/dL 9.0 9.7 9.7 10.0   PROTEIN TOTAL g/dL  --   --   --  7.1   BILIRUBIN TOTAL mg/dL  --   --   --  0.6   ALK PHOS U/L  --   --   --  99   ALT U/L  --   --   --  25   AST U/L   --   --   --  37   GLUCOSE mg/dL 93 74 127* 90            CT pelvis wo IV contrast   Final Result   No acute fracture or suspicious osseous lesions.   Mild osteoarthritis of the right hip.   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        Signed by: Cali Martinez 11/8/2024 3:59 PM   Dictation workstation:   [id number]      XR hip right with pelvis when performed 2 or 3 views   Final Result   Intact total left hip arthroplasty. Degenerative changes of the right   hip joint. No acute fracture or subluxation.        Signed by: Brendan Taylor 11/7/2024 11:42 AM   Dictation workstation:   [id number]      CT head wo IV contrast   Final Result   No evidence of acute cortical infarct or intracranial hemorrhage.        Chronic changes as described.        MACRO:   None        Signed by: Dagoberto Trejo 11/7/2024 11:14 AM   Dictation workstation:   [id number]      CT cervical spine wo IV contrast   Final Result   No evidence for an acute fracture or subluxation of the cervical   spine.        MACRO:   None        Signed by: Dagoberto Trejo 11/7/2024 11:34 AM   Dictation workstation:   [id number]            Physical Exam    Constitutional   General appearance: Alert and in no acute distress.   Pulmonary   Respiratory assessment: No respiratory distress, normal respiratory rhythm and effort.    Auscultation of Lungs: Clear bilateral breath sounds.   Cardiovascular   Auscultation of heart: Apical pulse normal, heart rate and rhythm normal, normal S1 and S2, no murmurs and no pericardial rub.    Exam for edema: No peripheral edema.   Abdomen   Abdominal Exam: No bruits, normal bowel sounds, soft, non-tender, no abdominal mass palpated.    Liver and Spleen exam: No hepato-splenomegaly.   Musculoskeletal   Moves all extremities  Neurologic   Cranial nerves: Nerves 2-12 were intact, no focal neuro defects.         Assessment/Plan      #Urinary tract infection  Continue  antibiotics    #Hyponatremia  Has resolved    #Chronic hip pain  From chronic osteoarthritis  Lidocaine patch/pain control  PT OT consulted    #Schizophrenia  Continue home medications    #Diabetes mellitus  Sliding scale insulin coverage    #Hypertension  Stable continue home medications    PTOT    Pt stable for dc when arranged to snf    Plan of care discussed with: Provider, RN, Patient    Patient case and plan of care discussed with Dr. TOM De La Rosa.    Ricardo Matthews, DARIEN - CNP  -In collaboration with Dr. TOM De La Rosa    [facility]  Office: [phone]  Fax: [phone]  I have reviewed the above note obtained and documented by the NP/PA and I personally participated in the key components. I have discussed the case and management of the patient's care. Changes made to the note, and all key components of history and physical/progress note done by me.  dw nursing  Rocephin for uti, mixed growth gabby dc  Dc plan for snf  Pt is medically cleared for dc  Shiv De La Rosa MD    .

## 2024-11-11 NOTE — CONSULTS
Nutrition Assessment Note    Reason for Assessment  Reason for Assessment: Admission nursing screening    Pt admitted for:  Falls frequently [R29.6]  Fall, initial encounter [W19.XXXA]  Pain of right hip [M25.551]    MST triggered for unsure for weight loss and eating poorly due to decreased appetite.  Chart reviewed and pt visited.  Per pt appetite comes and goes.  Reported -200# a while ago.  Pt typically eats 3 meals a day.    Pt agreeable to supplement while admitted.    Past Medical History:   Diagnosis Date    Dysphagia     Emphysema lung (Multi)     GERD (gastroesophageal reflux disease)     HLD (hyperlipidemia)     Hypertension, essential     Low back pain     PVD (peripheral vascular disease) (CMS-[facility])     Schizophrenia     Type 2 diabetes mellitus      Results for orders placed or performed during the hospital encounter of 11/07/24 (from the past 24 hours)   POCT GLUCOSE   Result Value Ref Range    POCT Glucose 110 (H) 74 - 99 mg/dL   POCT GLUCOSE   Result Value Ref Range    POCT Glucose 125 (H) 74 - 99 mg/dL   POCT GLUCOSE   Result Value Ref Range    POCT Glucose 101 (H) 74 - 99 mg/dL   POCT GLUCOSE   Result Value Ref Range    POCT Glucose 93 74 - 99 mg/dL     Scheduled medications  aspirin, 81 mg, oral, Daily  atorvastatin, 80 mg, oral, Daily  calcium carbonate-vitamin D3, 1 tablet, oral, BID  cefTRIAXone, 1 g, intravenous, q24h  cyanocobalamin, 1,000 mcg, oral, Daily  DULoxetine, 60 mg, oral, Daily  enoxaparin, 40 mg, subcutaneous, q24h  insulin glargine, 44 Units, subcutaneous, Nightly  insulin lispro, 6 Units, subcutaneous, TID  isosorbide mononitrate ER, 60 mg, oral, Daily  lidocaine, 1 patch, transdermal, Daily  lidocaine, 1 patch, transdermal, Daily  lithium ER, 300 mg, oral, BID  methocarbamol, 500 mg, oral, q8h VILMA  ondansetron ODT, 4 mg, oral, Once  pantoprazole, 20 mg, oral, Daily before breakfast  polyethylene glycol, 17 g, oral, Daily  pregabalin, 200 mg, oral, BID  QUEtiapine, 25 mg,  "oral, Nightly  tamsulosin, 0.4 mg, oral, Daily  traZODone, 50 mg, oral, Nightly  trihexyphenidyl, 2 mg, oral, BID      Continuous medications     PRN medications  PRN medications: acetaminophen **OR** acetaminophen **OR** acetaminophen, HYDROcodone-acetaminophen  Dietary Orders (From admission, onward)       Start     Ordered    11/11/24 1506  Oral nutritional supplements  Until discontinued        Question Answer Comment   Deliver with Breakfast strawberry   Select supplement: Glucerna Shake        11/11/24 1506    11/08/24 1017  Adult diet Consistent Carb; CCD 60 gm/meal; 1200 mL fluid  Diet effective now        Question Answer Comment   Diet type Consistent Carb    Carb diet selection: CCD 60 gm/meal    Dietary fluid restriction / 24h: 1200 mL fluid        11/08/24 1016    11/07/24 1831  May Participate in Room Service  ( ROOM SERVICE MAY PARTICIPATE)  Once        Question:  .  Answer:  Yes    11/07/24 1830                    History:  Food and Nutrient History  Energy Intake: Good > 75 %  Food and Nutrient History: per flowsheets    Anthropometrics:  Height: 175.3 cm (5' 9\")  Weight: 83.9 kg (185 lb)  BMI (Calculated): 27.31    Wt Readings from Last 2 Encounters:   11/07/24 83.9 kg (185 lb)   09/05/24 77.1 kg (170 lb)     Weight Change  Weight History / % Weight Change: 88.5kg 5/15/24, 92.1kg 11/16/23  Significant Weight Loss: No    Estimated Energy Needs  Total Energy Estimated Needs (kCal): 2100 kCal  Total Estimated Energy Need per Day (kCal/kg): 2520 kCal/kg  Method for Estimating Needs: 25-30    Estimated Protein Needs  Total Protein Estimated Needs (g): 65 g  Total Protein Estimated Needs (g/kg): 85 g/kg  Method for Estimating Needs: 0.8-1.0    Estimated Fluid Needs  Method for Estimating Needs: 1ml/kcal or per MD    Nutrition Focused Physical Findings:  Subcutaneous Fat Loss  Orbital Fat Pads: Well nourished (slightly bulging fat pads)  Buccal Fat Pads: Well nourished (full, rounded cheeks)    Muscle " Wasting  Temporalis: Well nourished (well-defined muscle)    Edema  Edema: none    Physical Findings (Nutrition Deficiency/Toxicity)  Skin: Negative     Nutrition Diagnosis   Malnutrition Diagnosis  Patient has Malnutrition Diagnosis: No    Patient has Nutrition Diagnosis: Yes  Nutrition Diagnosis 1: Predicted inadequate energy intake  Diagnosis Status (1): New  Related to (1): chronic illness  As Evidenced by (1): report of varied appetite       Nutrition Interventions/Recommendations   Food and/or Nutrient Delivery Interventions  Goal: > 75% of meals consumed     Medical Food Supplement: Commercial beverage  Goal: Glucerna Daily for encouraged intake    Education Documentation  No documentation found.      Nutrition Monitoring and Evaluation   Food and Nutrient Related History  Energy Intake: Estimated energy intake    Fluid Intake: Estimated fluid intake    Amount of Food: Estimated amout of food, Medical food intake    Mealtime Behavior: Limited number of accepted foods    Anthropometrics: Body Composition/Growth/Weight History  Weight Change: Weight gain, Weight loss    Biochemical Data, Medical Tests and Procedures  Electrolyte and Renal Panel: Other (Comment)  Criteria: as clinically indicated    Gastrointestinal Profile: Other (Comment)  Criteria: as clinically indicated    Glucose/Endocrine Profile: Other (Comment)  Criteria: as clinically indicated    Nutritional Anemia Profile: Other (Comment)  Criteria: as clinically indicated    Vitamin Profile: Other (Comment)  Criteria: as clinically indicated    Nutrition Focused Physical Findings  Digestive System: Decrease in appetite    Other: Stool output, Urine volume, Overall appearance    Follow Up  Time Spent (min): 60 minutes  Last Date of Nutrition Visit: 11/11/24  Nutrition Follow-Up Needed?: Dietitian to reassess per policy  Follow up Comment: KARLI URIBE

## 2024-11-11 NOTE — DISCHARGE SUMMARY
Inpatient Discharge Summary    BRIEF OVERVIEW  Admitting Provider: Shiv De La Rosa MD  Discharge Provider: Shiv De La Rosa MD  Primary Care Physician at Discharge: Jared Andrew [phone]      Treatment Team:   Attending Provider: Shiv De La Rosa MD  Consulting Physician: Zaid Rubin MD  Registered Nurse: Ayse Youngblood RN  Occupational Therapist: Edith Low OT  Transitional Care Coordinator: Rosanna Castaneda  Licensed Practical Nurse: Kristan Kerr LPN  Patient Care Technician: KEHINDE Cantu      Admission Date: 11/7/2024     Discharge Date: 11/11/2024    Primary Discharge Diagnosis  Principal Problem:    Falls frequently  Active Problems:    Fall, initial encounter        Discharge Disposition  Home  Code Status at Discharge: full     Active Issues Requiring Follow-up  hospital follow up, general health maintenance and medication refills.  With pcp     Outpatient Follow-Up  No future appointments.    [unfilled]    Test Results Pending at Discharge  Pending Labs       Order Current Status    Extra Urine Gray Tube Collected (11/08/24 1103)    Urinalysis with Reflex Culture and Microscopic In process                  DETAILS OF HOSPITAL STAY    Presenting Problem/History of Present Illness  Falls frequently [R29.6]  Fall, initial encounter [W19.XXXA]  Pain of right hip [M25.551]      History Of Present Illness  Brannon Engel is a 69 y.o. male presenting with fall  Pt is a 70 y/o M with a PMH of empysema, GERD, HLD, HTN, PVD, DM2 and schizophrenia presenting to the ED after a fall   Pt seen in the ED , he is able to provide limited hx  Had a fall at the facility and did have difficulty walking and pain in the rt hip ,  ED workup with ct cspine, ct head  Xr of rt hip   Negative for fracture  Admitted as does have difficulty walking and needs pain controlled    Hospital Course       Assessment/Plan  #Urinary tract infection  Continue antibiotics     #Hyponatremia  Has resolved     #Chronic hip  pain  From chronic osteoarthritis  Lidocaine patch/pain control  PT OT consulted     #Schizophrenia  Continue home medications     #Diabetes mellitus  Sliding scale insulin coverage     #Hypertension  Stable continue home medications     PTOT     Pt stable for dc when arranged to snf            Your medication list        START taking these medications        Instructions Last Dose Given Next Dose Due   cephalexin 500 mg capsule  Commonly known as: Keflex      Take 1 capsule (500 mg) by mouth 2 times a day for 5 days.       insulin lispro 100 unit/mL injection  Replaces: HumaLOG KwikPen Insulin 200 unit/mL (3 mL) insulin pen pen      Inject 6 Units under the skin 3 times daily (morning, midday, late afternoon). Take as directed per insulin instructions.       lidocaine 4 % patch  Start taking on: November 12, 2024      Place 1 patch over 12 hours on the skin once daily. Remove & discard patch within 12 hours or as directed by MD.       lidocaine 4 % patch  Start taking on: November 12, 2024      Place 1 patch over 12 hours on the skin once daily. Remove & discard patch within 12 hours or as directed by MD.       methocarbamol 500 mg tablet  Commonly known as: Robaxin      Take 1 tablet (500 mg) by mouth every 8 hours.       polyethylene glycol 17 gram packet  Commonly known as: Glycolax, Miralax  Start taking on: November 12, 2024      Take 17 g by mouth once daily.              CHANGE how you take these medications        Instructions Last Dose Given Next Dose Due   acetaminophen 325 mg tablet  Commonly known as: Tylenol  What changed:   medication strength  how much to take  when to take this      Take 2 tablets (650 mg) by mouth every 4 hours if needed for mild pain (1 - 3).       traZODone 50 mg tablet  Commonly known as: Desyrel  What changed: See the new instructions.      Take 1 tablet (50 mg) by mouth once daily at bedtime.              CONTINUE taking these medications        Instructions Last Dose Given  Next Dose Due   aspirin 81 mg EC tablet           atorvastatin 80 mg tablet  Commonly known as: Lipitor           calcium carbonate-vitamin D3 500 mg-5 mcg (200 unit) tablet           cyanocobalamin 1,000 mcg tablet  Commonly known as: Vitamin B-12           DULoxetine 60 mg DR capsule  Commonly known as: Cymbalta           esomeprazole 40 mg DR capsule  Commonly known as: NexIUM           isosorbide mononitrate ER 60 mg 24 hr tablet  Commonly known as: Imdur           Lantus U-100 Insulin 100 unit/mL injection  Generic drug: insulin glargine           lithium  mg 12 hr tablet  Commonly known as: Lithobid           nitroglycerin 0.4 mg SL tablet  Commonly known as: Nitrostat           pregabalin 200 mg capsule  Commonly known as: Lyrica           QUEtiapine 25 mg tablet  Commonly known as: SEROquel           tamsulosin 0.4 mg 24 hr capsule  Commonly known as: Flomax      Take 1 capsule (0.4 mg) by mouth once daily.       trihexyphenidyl 2 mg tablet  Commonly known as: Artane                  STOP taking these medications      docusate sodium 100 mg capsule  Commonly known as: Colace        ferrous sulfate 325 (65 Fe) MG EC tablet        fluticasone 50 mcg/actuation nasal spray  Commonly known as: Flonase        glucose 40 % gel oral gel  Commonly known as: Glutose        HumaLOG KwikPen Insulin 200 unit/mL (3 mL) insulin pen pen  Generic drug: insulin lispro  Replaced by: insulin lispro 100 unit/mL injection        hydrocortisone 25 mg suppository  Commonly known as: Anusol-HC        ibuprofen 200 mg tablet        ipratropium-albuteroL 0.5-2.5 mg/3 mL nebulizer solution  Commonly known as: Duo-Neb        magnesium hydroxide 400 mg/5 mL suspension  Commonly known as: Milk of Magnesia        multivitamin tablet        oxybutynin XL 5 mg 24 hr tablet  Commonly known as: Ditropan-XL        oxyCODONE 5 mg immediate release capsule  Commonly known as: Oxy-IR        Ozempic 0.25 mg or 0.5 mg(2 mg/1.5 mL) pen  injector  Generic drug: semaglutide        triamcinolone 0.1 % cream  Commonly known as: Kenalog        varenicline 1 mg tablet  Commonly known as: Chantix        Vitamin D3 25 MCG (1000 UT) tablet  Generic drug: cholecalciferol        Voltaren Arthritis Pain 1 % gel  Generic drug: diclofenac sodium        zolpidem 5 mg tablet  Commonly known as: Ambien                  Where to Get Your Medications        Information about where to get these medications is not yet available    Ask your nurse or doctor about these medications  acetaminophen 325 mg tablet  cephalexin 500 mg capsule  insulin lispro 100 unit/mL injection  lidocaine 4 % patch  lidocaine 4 % patch  methocarbamol 500 mg tablet  polyethylene glycol 17 gram packet  tamsulosin 0.4 mg 24 hr capsule  traZODone 50 mg tablet              Physical Exam at Discharge  Discharge Condition: good  Heart Rate: 94  Resp: 18  BP: 103/71  Temp: 36.7 °C (98.1 °F)  Weight: 83.9 kg (185 lb)    agree [facility] discharge summary , reviewd and dw NP  Shiv De La Rosa MD

## 2024-11-11 NOTE — PROGRESS NOTES
Physical Therapy    Physical Therapy Treatment    Patient Name: Brannon Engel  MRN: [id number]  Department: [place] [id number]  Room: [address] [place] [address]  Today's Date: 11/11/2024  Time Calculation  Start Time: 0925  Stop Time: 0951  Time Calculation (min): 26 min         Assessment/Plan   PT Assessment  End of Session Communication: Bedside nurse  Assessment Comment:  (pt demo fair umm to activity, lo endurance to seated on chair.)  End of Session Patient Position: Bed, 3 rail up, Alarm on  PT Plan  Inpatient/Swing Bed or Outpatient: Inpatient  PT Plan  Treatment/Interventions: Bed mobility, Transfer training, Stair training, Gait training, Therapeutic activity, Therapeutic exercise  PT Plan: Ongoing PT  PT Frequency: 3 times per week  PT Discharge Recommendations: Moderate intensity level of continued care  PT Recommended Transfer Status: Assist x1  PT - OK to Discharge: Yes (per PT POC)      General Visit Information:   PT  Visit  PT Received On: 11/11/24  General  Reason for Referral: Pt is a 70 y/o male who presented to the ED s/p fall at [facility] (-) for acute changes.  Referred By: PARI Sousa  Family/Caregiver Present: No  Co-Treatment: OT  Co-Treatment Reason: partial cotx with OT to maximize safety and participation with skilled intervention  Prior to Session Communication: Bedside nurse  Patient Position Received: Bed, 3 rail up, Alarm off, caregiver present  Preferred Learning Style: auditory, kinesthetic, verbal  General Comment:  (pt agreeable to tx.)    Subjective   Precautions:  Precautions  Medical Precautions: Fall precautions    Vital Signs (Past 2hrs)                 Objective   Pain:  Pain Assessment  Pain Assessment: 0-10  0-10 (Numeric) Pain Score: 8  Pain Type: Acute pain  Pain Location: Back  Pain Orientation: Lower  Pain Interventions: Repositioned  Cognition:  Cognition  Overall Cognitive Status: Impaired  Orientation Level: Disoriented to time  Coordination:     Postural Control:      Extremity/Trunk Assessments:    Activity Tolerance:  Activity Tolerance  Endurance: Decreased tolerance for upright activites  Treatments:  Therapeutic Exercise  Therapeutic Exercise Performed:  (pt perfomred seated ther ex:heel raises, toe taps, LAQ x 8-10 reps with BLe's pt req increasede time and cues to improve ROM adn complete ther ex.)              Ambulation/Gait Training  Ambulation/Gait Training Performed: Yes  Ambulation/Gait Training 1  Surface 1: Level tile  Device 1: Rolling walker  Gait Support Devices: Gait belt  Assistance 1: Moderate assistance, Maximum assistance (mod x 2, max x x1)  Quality of Gait 1: Narrow base of support, Inconsistent stride length, Decreased step length, Antalgic  Comments/Distance (ft) 1: 4-5 short shuffle steps x2 bed to chair and back to bed  Transfers  Transfer: Yes  Transfer 1  Transfer From 1: Sit to, Stand to  Technique 1: Sit to stand, Stand to sit  Transfer Device 1: Walker, Gait belt  Transfer Level of Assistance 1: Moderate assistance, +2, Moderate verbal cues  Trials/Comments 1: 3 trials (pt req increased cues for proper hand placement and sequencing.)         Outcome Measures:  [facility] Basic Mobility  Turning from your back to your side while in a flat bed without using bedrails: A lot  Moving from lying on your back to sitting on the side of a flat bed without using bedrails: A lot  Moving to and from bed to chair (including a wheelchair): A lot  Standing up from a chair using your arms (e.g. wheelchair or bedside chair): A lot  To walk in hospital room: A lot  Climbing 3-5 steps with railing: Total  Basic Mobility - Total Score: 11    Education Documentation  Body Mechanics, taught by Huey Hudson PTA at 11/11/2024 10:51 AM.  Learner: Patient  Readiness: Acceptance  Method: Explanation  Response: Verbalizes Understanding, Needs Reinforcement    Mobility Training, taught by Huey Hudson PTA at 11/11/2024 10:51 AM.  Learner: Patient  Readiness:  Acceptance  Method: Explanation  Response: Verbalizes Understanding, Needs Reinforcement    Education Comments  No comments found.        OP EDUCATION:       Encounter Problems       Encounter Problems (Active)       Balance       Pt will tolerate 10+ mins dynamic standing balance activities with CGA or less and no LOB.        Start:  11/08/24    Expected End:  11/22/24               Mobility       STG - Patient will ambulate 50 ft with CGA and a RW.        Start:  11/08/24    Expected End:  11/22/24            Pt will tolerate 15 reps of each LE exercise in order to improve strength and endurance.         Start:  11/08/24    Expected End:  11/22/24               PT Transfers       STG - Patient will perform bed mobility independently. (Progressing)       Start:  11/08/24    Expected End:  11/22/24            STG - Patient will transfer sit to and from stand with CGA and a RW.  (Progressing)       Start:  11/08/24    Expected End:  11/22/24               Pain - Adult

## 2024-11-11 NOTE — PROGRESS NOTES
Occupational Therapy    OT Treatment    Patient Name: Brannon Engel  MRN: [id number]  Department: [place] [address]  Room: [address] [place] [address]  Today's Date: 11/11/2024  Time Calculation  Start Time: 0918  Stop Time: 0938  Time Calculation (min): 20 min        Assessment:  End of Session Communication: Bedside nurse  End of Session Patient Position: Up in chair, Alarm on     Plan:  Treatment Interventions: ADL retraining, Functional transfer training, UE strengthening/ROM, Endurance training, Cognitive reorientation, Patient/family training, Equipment evaluation/education, Neuromuscular reeducation, Compensatory technique education  OT Frequency: 3 times per week  OT Discharge Recommendations: Moderate intensity level of continued care  Equipment Recommended upon Discharge:  (TBD)  OT Recommended Transfer Status: Moderate assist, Assist of 2  OT - OK to Discharge: Yes (continue per OT POC)  Treatment Interventions: ADL retraining, Functional transfer training, UE strengthening/ROM, Endurance training, Cognitive reorientation, Patient/family training, Equipment evaluation/education, Neuromuscular reeducation, Compensatory technique education    Subjective   Previous Visit Info:  OT Last Visit  OT Received On: 11/11/24  General:  General  Reason for Referral: Pt is a 68 y/o male who presented to the ED s/p fall at [facility]. Imaging (-) for acute changes.  Referred By: DARIEN Sousa-CNP  Past Medical History Relevant to Rehab:   Past Medical History:   Diagnosis Date    Dysphagia     Emphysema lung (Multi)     GERD (gastroesophageal reflux disease)     HLD (hyperlipidemia)     Hypertension, essential     Low back pain     PVD (peripheral vascular disease) (CMS-HCC)     Schizophrenia     Type 2 diabetes mellitus       Family/Caregiver Present: No  Co-Treatment: PT  Co-Treatment Reason: partial cotx with PTA to maximize safety and participation with skilled intervention  Prior to Session Communication: Bedside nurse  Patient Position  Received: Bed, 3 rail up, Alarm off, not on at start of session  General Comment: Pt supine in bed upon arrival and agreeable to treatment. Pt participates as able, limited by pain this date.  Precautions:  Medical Precautions: Fall precautions        Pain:  Pain Assessment  Pain Assessment: 0-10  0-10 (Numeric) Pain Score: 8 (RN notified)  Pain Type: Acute pain  Pain Location: Back  Pain Orientation: Lower  Pain Interventions: Repositioned    Objective    Cognition:  Cognition  Overall Cognitive Status: Impaired  Orientation Level: Disoriented to time       Activities of Daily Living:      UE Dressing  UE Dressing Level of Assistance: Minimum assistance, Minimal verbal cues  UE Dressing Where Assessed: Edge of bed  UE Dressing Comments: to don front opening garment    LE Dressing  LE Dressing: Yes  LE Dressing Adaptive Equipment: Reacher, Sock aide  Sock Level of Assistance: Moderate assistance, Moderate verbal cues  LE Dressing Where Assessed: Chair  LE Dressing Comments: cues for sequencing and correct use of AE, extra time to perform d/t pain       Bed Mobility/Transfers: Bed Mobility  Bed Mobility: Yes  Bed Mobility 1  Bed Mobility 1: Supine to sitting  Level of Assistance 1: Close supervision, Minimal verbal cues  Bed Mobility Comments 1: cues for sequencing, extra time to perform    Transfers  Transfer: Yes  Transfer 1  Technique 1: Sit to stand, Stand to sit  Transfer Device 1: Gait belt, Walker  Transfer Level of Assistance 1: Moderate assistance, +2, Moderate verbal cues  Trials/Comments 1: cues for sequencing, safe hand placement and walker safety  Transfers 2  Transfer From 2: Bed to  Transfer to 2: Chair with arms  Technique 2: Stand pivot  Transfer Device 2: Gait belt, Walker  Transfer Level of Assistance 2: Moderate assistance, +2, Maximum verbal cues  Trials/Comments 2: cues for sequencing, walker safety, upright posture d/t retrolean and alignment of body to chair, pt attempting to sit prematurely  during transfer    Sitting Balance:  Static Sitting Balance  Static Sitting-Balance Support: Bilateral upper extremity supported, Feet supported  Static Sitting-Level of Assistance: Close supervision  Static Sitting-Comment/Number of Minutes: at EOB  Dynamic Sitting Balance  Dynamic Sitting-Comments: SBA at EOB  Standing Balance:  Static Standing Balance  Static Standing-Balance Support: Bilateral upper extremity supported  Static Standing-Level of Assistance: Moderate assistance  Static Standing-Comment/Number of Minutes: using FWW, max cues for upright posture d/t retrolean  Dynamic Standing Balance  Dynamic Standing-Comments: Mod A x2      Outcome Measures:[facility] Daily Activity  Putting on and taking off regular lower body clothing: A lot  Bathing (including washing, rinsing, drying): A lot  Putting on and taking off regular upper body clothing: A little  Toileting, which includes using toilet, bedpan or urinal: A lot  Taking care of personal grooming such as brushing teeth: A little  Eating Meals: A little  Daily Activity - Total Score: 15        Education Documentation  Body Mechanics, taught by Edith Low OT at 11/11/2024 11:26 AM.  Learner: Patient  Readiness: Acceptance  Method: Explanation  Response: Needs Reinforcement    Precautions, taught by Edith Low OT at 11/11/2024 11:26 AM.  Learner: Patient  Readiness: Acceptance  Method: Explanation  Response: Needs Reinforcement    ADL Training, taught by Edith Low OT at 11/11/2024 11:26 AM.  Learner: Patient  Readiness: Acceptance  Method: Explanation  Response: Needs Reinforcement    Education Comments  No comments found.           Goals:  Encounter Problems       Encounter Problems (Active)       ADLs       Patient will perform UB and LB bathing with minimal assist  level of assistance and grab bars, shower chair, and long-handled sponge. (Progressing)       Start:  11/08/24    Expected End:  11/22/24            Patient with complete upper body  dressing with supervision level of assistance donning and doffing all UE clothes with PRN adaptive equipment. (Progressing)       Start:  11/08/24    Expected End:  11/22/24            Patient with complete lower body dressing with minimal assist  level of assistance donning and doffing all LE clothes  with PRN adaptive equipment. (Progressing)       Start:  11/08/24    Expected End:  11/22/24            Patient will complete daily grooming tasks with set-up level of assistance and PRN adaptive equipment. (Progressing)       Start:  11/08/24    Expected End:  11/22/24            Patient will complete toileting including hygiene clothing management/hygiene with minimal assist  level of assistance and raised toilet seat and grab bars vs. BSC. (Progressing)       Start:  11/08/24    Expected End:  11/22/24               MOBILITY       Patient will perform Functional mobility x Household distances/Community Distances with contact guard assist level of assistance and least restrictive device in order to improve safety and functional mobility. (Progressing)       Start:  11/08/24    Expected End:  11/22/24               TRANSFERS       Patient will perform bed mobility modified independent level of assistance in order to improve safety and independence with mobility (Progressing)       Start:  11/08/24    Expected End:  11/22/24            Patient will complete functional transfers with least restrictive device with contact guard assist level of assistance. (Progressing)       Start:  11/08/24    Expected End:  11/22/24

## 2024-11-11 NOTE — PROGRESS NOTES
11/11/24 0909   Discharge Planning   Expected Discharge Disposition SNF     Once med ready plan would be to discharge to [facility], PT/OT both rec'd Mod, will need new updated notes before auth can be requested, reached out to PT/OT to see today.    12:18 PT/OT updated notes rec'd Mod, I did request for auth to be started for facility, I will send updated notes in [facility].

## 2024-11-12 LAB
GLUCOSE BLD MANUAL STRIP-MCNC: 108 MG/DL (ref 74–99)
GLUCOSE BLD MANUAL STRIP-MCNC: 141 MG/DL (ref 74–99)
GLUCOSE BLD MANUAL STRIP-MCNC: 80 MG/DL (ref 74–99)
GLUCOSE BLD MANUAL STRIP-MCNC: 88 MG/DL (ref 74–99)

## 2024-11-12 PROCEDURE — 2500000001 HC RX 250 WO HCPCS SELF ADMINISTERED DRUGS (ALT 637 FOR MEDICARE OP): Performed by: FAMILY MEDICINE

## 2024-11-12 PROCEDURE — 82947 ASSAY GLUCOSE BLOOD QUANT: CPT

## 2024-11-12 PROCEDURE — 2500000004 HC RX 250 GENERAL PHARMACY W/ HCPCS (ALT 636 FOR OP/ED): Performed by: NURSE PRACTITIONER

## 2024-11-12 PROCEDURE — G0378 HOSPITAL OBSERVATION PER HR: HCPCS

## 2024-11-12 PROCEDURE — 2500000002 HC RX 250 W HCPCS SELF ADMINISTERED DRUGS (ALT 637 FOR MEDICARE OP, ALT 636 FOR OP/ED): Performed by: FAMILY MEDICINE

## 2024-11-12 PROCEDURE — 2500000002 HC RX 250 W HCPCS SELF ADMINISTERED DRUGS (ALT 637 FOR MEDICARE OP, ALT 636 FOR OP/ED): Performed by: NURSE PRACTITIONER

## 2024-11-12 PROCEDURE — 1100000001 HC PRIVATE ROOM DAILY

## 2024-11-12 PROCEDURE — 2500000001 HC RX 250 WO HCPCS SELF ADMINISTERED DRUGS (ALT 637 FOR MEDICARE OP): Performed by: NURSE PRACTITIONER

## 2024-11-12 RX ADMIN — Medication 1000 MCG: at 09:56

## 2024-11-12 RX ADMIN — ASPIRIN 81 MG: 81 TABLET, COATED ORAL at 09:55

## 2024-11-12 RX ADMIN — PREGABALIN 200 MG: 100 CAPSULE ORAL at 20:49

## 2024-11-12 RX ADMIN — ISOSORBIDE MONONITRATE 60 MG: 30 TABLET, EXTENDED RELEASE ORAL at 09:56

## 2024-11-12 RX ADMIN — INSULIN GLARGINE 44 UNITS: 100 INJECTION, SOLUTION SUBCUTANEOUS at 20:52

## 2024-11-12 RX ADMIN — PREGABALIN 200 MG: 100 CAPSULE ORAL at 09:56

## 2024-11-12 RX ADMIN — TRIHEXYPHENIDYL HYDROCHLORIDE 2 MG: 2 TABLET ORAL at 17:14

## 2024-11-12 RX ADMIN — LITHIUM CARBONATE 300 MG: 300 TABLET, EXTENDED RELEASE ORAL at 20:49

## 2024-11-12 RX ADMIN — Medication 1 TABLET: at 20:49

## 2024-11-12 RX ADMIN — LITHIUM CARBONATE 300 MG: 300 TABLET, EXTENDED RELEASE ORAL at 09:57

## 2024-11-12 RX ADMIN — INSULIN LISPRO 6 UNITS: 100 INJECTION, SOLUTION INTRAVENOUS; SUBCUTANEOUS at 10:03

## 2024-11-12 RX ADMIN — METHOCARBAMOL TABLETS 500 MG: 500 TABLET, COATED ORAL at 13:22

## 2024-11-12 RX ADMIN — QUETIAPINE FUMARATE 25 MG: 25 TABLET ORAL at 20:50

## 2024-11-12 RX ADMIN — POLYETHYLENE GLYCOL 3350 17 G: 17 POWDER, FOR SOLUTION ORAL at 09:57

## 2024-11-12 RX ADMIN — Medication 1 TABLET: at 09:56

## 2024-11-12 RX ADMIN — TAMSULOSIN HYDROCHLORIDE 0.4 MG: 0.4 CAPSULE ORAL at 09:56

## 2024-11-12 RX ADMIN — METHOCARBAMOL TABLETS 500 MG: 500 TABLET, COATED ORAL at 06:56

## 2024-11-12 RX ADMIN — ATORVASTATIN CALCIUM 80 MG: 80 TABLET, FILM COATED ORAL at 09:56

## 2024-11-12 RX ADMIN — DULOXETINE HYDROCHLORIDE 60 MG: 30 CAPSULE, DELAYED RELEASE ORAL at 09:56

## 2024-11-12 RX ADMIN — ENOXAPARIN SODIUM 40 MG: 40 INJECTION SUBCUTANEOUS at 17:15

## 2024-11-12 RX ADMIN — PANTOPRAZOLE SODIUM 20 MG: 40 TABLET, DELAYED RELEASE ORAL at 06:56

## 2024-11-12 RX ADMIN — TRAZODONE HYDROCHLORIDE 50 MG: 50 TABLET ORAL at 20:50

## 2024-11-12 RX ADMIN — INSULIN LISPRO 6 UNITS: 100 INJECTION, SOLUTION INTRAVENOUS; SUBCUTANEOUS at 13:26

## 2024-11-12 RX ADMIN — INSULIN LISPRO 6 UNITS: 100 INJECTION, SOLUTION INTRAVENOUS; SUBCUTANEOUS at 17:15

## 2024-11-12 RX ADMIN — TRIHEXYPHENIDYL HYDROCHLORIDE 2 MG: 2 TABLET ORAL at 09:56

## 2024-11-12 RX ADMIN — METHOCARBAMOL TABLETS 500 MG: 500 TABLET, COATED ORAL at 21:01

## 2024-11-12 ASSESSMENT — COGNITIVE AND FUNCTIONAL STATUS - GENERAL
TOILETING: A LITTLE
CLIMB 3 TO 5 STEPS WITH RAILING: A LOT
MOVING TO AND FROM BED TO CHAIR: A LITTLE
WALKING IN HOSPITAL ROOM: A LITTLE
HELP NEEDED FOR BATHING: A LITTLE
DRESSING REGULAR UPPER BODY CLOTHING: A LITTLE
STANDING UP FROM CHAIR USING ARMS: A LITTLE
PERSONAL GROOMING: A LITTLE
MOBILITY SCORE: 18
DAILY ACTIVITIY SCORE: 19
DRESSING REGULAR LOWER BODY CLOTHING: A LITTLE
TURNING FROM BACK TO SIDE WHILE IN FLAT BAD: A LITTLE

## 2024-11-12 ASSESSMENT — PAIN - FUNCTIONAL ASSESSMENT
PAIN_FUNCTIONAL_ASSESSMENT: 0-10

## 2024-11-12 ASSESSMENT — PAIN SCALES - GENERAL
PAINLEVEL_OUTOF10: 0 - NO PAIN
PAINLEVEL_OUTOF10: 8
PAINLEVEL_OUTOF10: 8

## 2024-11-12 NOTE — CARE PLAN
Problem: Pain - Adult  Goal: Verbalizes/displays adequate comfort level or baseline comfort level  Outcome: Progressing     Problem: Safety - Adult  Goal: Free from fall injury  Outcome: Progressing     Problem: Discharge Planning  Goal: Discharge to home or other facility with appropriate resources  Outcome: Progressing     Problem: Chronic Conditions and Co-morbidities  Goal: Patient's chronic conditions and co-morbidity symptoms are monitored and maintained or improved  Outcome: Progressing     Problem: Fall/Injury  Goal: Not fall by end of shift  Outcome: Progressing  Goal: Be free from injury by end of the shift  Outcome: Progressing  Goal: Verbalize understanding of personal risk factors for fall in the hospital  Outcome: Progressing  Goal: Verbalize understanding of risk factor reduction measures to prevent injury from fall in the home  Outcome: Progressing  Goal: Use assistive devices by end of the shift  Outcome: Progressing  Goal: Pace activities to prevent fatigue by end of the shift  Outcome: Progressing     Problem: Pain  Goal: Takes deep breaths with improved pain control throughout the shift  Outcome: Progressing  Goal: Turns in bed with improved pain control throughout the shift  Outcome: Progressing  Goal: Walks with improved pain control throughout the shift  Outcome: Progressing  Goal: Performs ADL's with improved pain control throughout shift  Outcome: Progressing  Goal: Participates in PT with improved pain control throughout the shift  Outcome: Progressing  Goal: Free from opioid side effects throughout the shift  Outcome: Progressing  Goal: Free from acute confusion related to pain meds throughout the shift  Outcome: Progressing     Problem: Skin  Goal: Decreased wound size/increased tissue granulation at next dressing change  Outcome: Progressing  Goal: Participates in plan/prevention/treatment measures  Outcome: Progressing  Goal: Prevent/manage excess moisture  Outcome: Progressing  Goal:  10 Prevent/minimize sheer/friction injuries  Outcome: Progressing  Goal: Promote/optimize nutrition  Outcome: Progressing  Goal: Promote skin healing  Outcome: Progressing   The patient's goals for the shift include:   restful night and pain free.    The clinical goals for the shift include Pt to remain safe

## 2024-11-12 NOTE — PROGRESS NOTES
11/12/24 0753   Discharge Planning   Expected Discharge Disposition SNF     Once auth is received plan is to discharge to [facility], PT/OT rec'd Mod on 11/11, auth was started on 11/11, clinical updates have been sent to the facility through [facility], I will continue to monitor for discharge planing.

## 2024-11-12 NOTE — PROGRESS NOTES
Brannon Engel is a 69 y.o. male       Pt resting in bed  Nad  No complaints  Pain in hip controlled      Review of Systems       Vitals:    11/12/24 0955   BP: 127/69   Pulse:    Resp:    Temp:    SpO2:         Scheduled medications  aspirin, 81 mg, oral, Daily  atorvastatin, 80 mg, oral, Daily  calcium carbonate-vitamin D3, 1 tablet, oral, BID  cyanocobalamin, 1,000 mcg, oral, Daily  DULoxetine, 60 mg, oral, Daily  enoxaparin, 40 mg, subcutaneous, q24h  insulin glargine, 44 Units, subcutaneous, Nightly  insulin lispro, 6 Units, subcutaneous, TID  isosorbide mononitrate ER, 60 mg, oral, Daily  lidocaine, 1 patch, transdermal, Daily  lidocaine, 1 patch, transdermal, Daily  lithium ER, 300 mg, oral, BID  methocarbamol, 500 mg, oral, q8h VILMA  ondansetron ODT, 4 mg, oral, Once  pantoprazole, 20 mg, oral, Daily before breakfast  polyethylene glycol, 17 g, oral, Daily  pregabalin, 200 mg, oral, BID  QUEtiapine, 25 mg, oral, Nightly  tamsulosin, 0.4 mg, oral, Daily  traZODone, 50 mg, oral, Nightly  trihexyphenidyl, 2 mg, oral, BID      Continuous medications     PRN medications  PRN medications: acetaminophen **OR** acetaminophen **OR** acetaminophen, HYDROcodone-acetaminophen    Lab Review   Results from last 7 days   Lab Units 11/10/24  0507 11/09/24  0509 11/08/24  0536   WBC AUTO x10*3/uL 10.6 10.5 11.5*   HEMOGLOBIN g/dL 14.1 14.7 14.0   HEMATOCRIT % 43.4 46.3 42.8   PLATELETS AUTO x10*3/uL 267 264 265     Results from last 7 days   Lab Units 11/10/24  0507 11/09/24  0509 11/08/24  0536 11/07/24  1318   SODIUM mmol/L 137 130* 127* 129*   POTASSIUM mmol/L 3.9 4.1 3.5 5.0   CHLORIDE mmol/L 109* 100 98 99   CO2 mmol/L 21 23 19* 24   BUN mg/dL 10 12 12 9   CREATININE mg/dL 0.97 1.11 1.14 1.08   CALCIUM mg/dL 9.0 9.7 9.7 10.0   PROTEIN TOTAL g/dL  --   --   --  7.1   BILIRUBIN TOTAL mg/dL  --   --   --  0.6   ALK PHOS U/L  --   --   --  99   ALT U/L  --   --   --  25   AST U/L  --   --   --  37   GLUCOSE mg/dL 93 74 127*  90            CT pelvis wo IV contrast   Final Result   No acute fracture or suspicious osseous lesions.   Mild osteoarthritis of the right hip.   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        Signed by: Cali Martinez 11/8/2024 3:59 PM   Dictation workstation:   [id number]      XR hip right with pelvis when performed 2 or 3 views   Final Result   Intact total left hip arthroplasty. Degenerative changes of the right   hip joint. No acute fracture or subluxation.        Signed by: Brendan Taylor 11/7/2024 11:42 AM   Dictation workstation:   [id number]      CT head wo IV contrast   Final Result   No evidence of acute cortical infarct or intracranial hemorrhage.        Chronic changes as described.        MACRO:   None        Signed by: Dagoberto Trejo 11/7/2024 11:14 AM   Dictation workstation:   [id number]      CT cervical spine wo IV contrast   Final Result   No evidence for an acute fracture or subluxation of the cervical   spine.        MACRO:   None        Signed by: Dagoberto Trejo 11/7/2024 11:34 AM   Dictation workstation:   [id number]            Physical Exam    Constitutional   General appearance: Alert and in no acute distress.   Pulmonary   Respiratory assessment: No respiratory distress, normal respiratory rhythm and effort.    Auscultation of Lungs: Clear bilateral breath sounds.   Cardiovascular   Auscultation of heart: Apical pulse normal, heart rate and rhythm normal, normal S1 and S2, no murmurs and no pericardial rub.    Exam for edema: No peripheral edema.   Abdomen   Abdominal Exam: No bruits, normal bowel sounds, soft, non-tender, no abdominal mass palpated.    Liver and Spleen exam: No hepato-splenomegaly.   Musculoskeletal   Moves all extremities  Neurologic   Cranial nerves: Nerves 2-12 were intact, no focal neuro defects.      pt has jc prior to admission      Assessment/Plan      #Urinary tract infection  Stopped rocephin    #Hyponatremia  Has  resolved    #Chronic hip pain  From chronic osteoarthritis  Lidocaine patch/pain control  PT OT consulted    #Schizophrenia  Continue home medications    #Diabetes mellitus  Sliding scale insulin coverage    #Hypertension  Stable continue home medications    PTOT    Pt stable for dc when arranged to snf  No antibiotics   Cont with jc     Plan of care discussed with: Provider, RN, Patient      Shiv De La Rosa MD    .

## 2024-11-13 LAB
GLUCOSE BLD MANUAL STRIP-MCNC: 135 MG/DL (ref 74–99)
GLUCOSE BLD MANUAL STRIP-MCNC: 151 MG/DL (ref 74–99)
GLUCOSE BLD MANUAL STRIP-MCNC: 178 MG/DL (ref 74–99)
GLUCOSE BLD MANUAL STRIP-MCNC: 82 MG/DL (ref 74–99)

## 2024-11-13 PROCEDURE — 2500000004 HC RX 250 GENERAL PHARMACY W/ HCPCS (ALT 636 FOR OP/ED): Performed by: NURSE PRACTITIONER

## 2024-11-13 PROCEDURE — 2500000002 HC RX 250 W HCPCS SELF ADMINISTERED DRUGS (ALT 637 FOR MEDICARE OP, ALT 636 FOR OP/ED): Performed by: NURSE PRACTITIONER

## 2024-11-13 PROCEDURE — 1100000001 HC PRIVATE ROOM DAILY

## 2024-11-13 PROCEDURE — 97110 THERAPEUTIC EXERCISES: CPT | Mod: GP,CQ | Performed by: PHYSICAL THERAPY ASSISTANT

## 2024-11-13 PROCEDURE — 2500000002 HC RX 250 W HCPCS SELF ADMINISTERED DRUGS (ALT 637 FOR MEDICARE OP, ALT 636 FOR OP/ED): Performed by: FAMILY MEDICINE

## 2024-11-13 PROCEDURE — 2500000001 HC RX 250 WO HCPCS SELF ADMINISTERED DRUGS (ALT 637 FOR MEDICARE OP): Performed by: FAMILY MEDICINE

## 2024-11-13 PROCEDURE — 2500000001 HC RX 250 WO HCPCS SELF ADMINISTERED DRUGS (ALT 637 FOR MEDICARE OP): Performed by: NURSE PRACTITIONER

## 2024-11-13 PROCEDURE — G0378 HOSPITAL OBSERVATION PER HR: HCPCS

## 2024-11-13 PROCEDURE — 82947 ASSAY GLUCOSE BLOOD QUANT: CPT

## 2024-11-13 PROCEDURE — 97530 THERAPEUTIC ACTIVITIES: CPT | Mod: GP,CQ | Performed by: PHYSICAL THERAPY ASSISTANT

## 2024-11-13 RX ADMIN — Medication 1000 MCG: at 08:58

## 2024-11-13 RX ADMIN — ISOSORBIDE MONONITRATE 60 MG: 30 TABLET, EXTENDED RELEASE ORAL at 08:58

## 2024-11-13 RX ADMIN — INSULIN LISPRO 6 UNITS: 100 INJECTION, SOLUTION INTRAVENOUS; SUBCUTANEOUS at 17:21

## 2024-11-13 RX ADMIN — ATORVASTATIN CALCIUM 80 MG: 80 TABLET, FILM COATED ORAL at 08:58

## 2024-11-13 RX ADMIN — INSULIN LISPRO 6 UNITS: 100 INJECTION, SOLUTION INTRAVENOUS; SUBCUTANEOUS at 09:00

## 2024-11-13 RX ADMIN — INSULIN GLARGINE 44 UNITS: 100 INJECTION, SOLUTION SUBCUTANEOUS at 20:46

## 2024-11-13 RX ADMIN — METHOCARBAMOL TABLETS 500 MG: 500 TABLET, COATED ORAL at 13:25

## 2024-11-13 RX ADMIN — TAMSULOSIN HYDROCHLORIDE 0.4 MG: 0.4 CAPSULE ORAL at 08:58

## 2024-11-13 RX ADMIN — QUETIAPINE FUMARATE 25 MG: 25 TABLET ORAL at 20:48

## 2024-11-13 RX ADMIN — TRAZODONE HYDROCHLORIDE 50 MG: 50 TABLET ORAL at 20:47

## 2024-11-13 RX ADMIN — PREGABALIN 200 MG: 100 CAPSULE ORAL at 08:58

## 2024-11-13 RX ADMIN — PREGABALIN 200 MG: 100 CAPSULE ORAL at 20:47

## 2024-11-13 RX ADMIN — HYDROCODONE BITARTRATE AND ACETAMINOPHEN 1 TABLET: 5; 325 TABLET ORAL at 20:47

## 2024-11-13 RX ADMIN — LITHIUM CARBONATE 300 MG: 300 TABLET, EXTENDED RELEASE ORAL at 08:58

## 2024-11-13 RX ADMIN — TRIHEXYPHENIDYL HYDROCHLORIDE 2 MG: 2 TABLET ORAL at 11:41

## 2024-11-13 RX ADMIN — ASPIRIN 81 MG: 81 TABLET, COATED ORAL at 08:58

## 2024-11-13 RX ADMIN — LITHIUM CARBONATE 300 MG: 300 TABLET, EXTENDED RELEASE ORAL at 20:47

## 2024-11-13 RX ADMIN — POLYETHYLENE GLYCOL 3350 17 G: 17 POWDER, FOR SOLUTION ORAL at 08:59

## 2024-11-13 RX ADMIN — HYDROCODONE BITARTRATE AND ACETAMINOPHEN 1 TABLET: 5; 325 TABLET ORAL at 08:58

## 2024-11-13 RX ADMIN — METHOCARBAMOL TABLETS 500 MG: 500 TABLET, COATED ORAL at 20:48

## 2024-11-13 RX ADMIN — INSULIN LISPRO 6 UNITS: 100 INJECTION, SOLUTION INTRAVENOUS; SUBCUTANEOUS at 13:25

## 2024-11-13 RX ADMIN — METHOCARBAMOL TABLETS 500 MG: 500 TABLET, COATED ORAL at 06:29

## 2024-11-13 RX ADMIN — ENOXAPARIN SODIUM 40 MG: 40 INJECTION SUBCUTANEOUS at 17:21

## 2024-11-13 RX ADMIN — DULOXETINE HYDROCHLORIDE 60 MG: 30 CAPSULE, DELAYED RELEASE ORAL at 08:58

## 2024-11-13 RX ADMIN — Medication 1 TABLET: at 20:47

## 2024-11-13 RX ADMIN — Medication 1 TABLET: at 08:58

## 2024-11-13 RX ADMIN — TRIHEXYPHENIDYL HYDROCHLORIDE 2 MG: 2 TABLET ORAL at 17:21

## 2024-11-13 RX ADMIN — PANTOPRAZOLE SODIUM 20 MG: 40 TABLET, DELAYED RELEASE ORAL at 06:29

## 2024-11-13 ASSESSMENT — COGNITIVE AND FUNCTIONAL STATUS - GENERAL
CLIMB 3 TO 5 STEPS WITH RAILING: TOTAL
PERSONAL GROOMING: A LITTLE
WALKING IN HOSPITAL ROOM: A LOT
CLIMB 3 TO 5 STEPS WITH RAILING: A LOT
TURNING FROM BACK TO SIDE WHILE IN FLAT BAD: A LITTLE
MOBILITY SCORE: 11
WALKING IN HOSPITAL ROOM: A LOT
WALKING IN HOSPITAL ROOM: A LOT
TOILETING: A LITTLE
MOVING TO AND FROM BED TO CHAIR: A LITTLE
HELP NEEDED FOR BATHING: A LITTLE
MOBILITY SCORE: 17
TOILETING: A LITTLE
DRESSING REGULAR UPPER BODY CLOTHING: A LITTLE
DAILY ACTIVITIY SCORE: 18
MOVING TO AND FROM BED TO CHAIR: A LOT
PERSONAL GROOMING: A LITTLE
TURNING FROM BACK TO SIDE WHILE IN FLAT BAD: A LITTLE
MOVING TO AND FROM BED TO CHAIR: A LITTLE
DAILY ACTIVITIY SCORE: 19
PERSONAL GROOMING: A LITTLE
TOILETING: A LITTLE
STANDING UP FROM CHAIR USING ARMS: A LITTLE
HELP NEEDED FOR BATHING: A LITTLE
MOVING FROM LYING ON BACK TO SITTING ON SIDE OF FLAT BED WITH BEDRAILS: A LOT
CLIMB 3 TO 5 STEPS WITH RAILING: A LOT
MOVING TO AND FROM BED TO CHAIR: A LITTLE
DRESSING REGULAR LOWER BODY CLOTHING: A LOT
DRESSING REGULAR UPPER BODY CLOTHING: A LITTLE
DRESSING REGULAR LOWER BODY CLOTHING: A LITTLE
CLIMB 3 TO 5 STEPS WITH RAILING: A LOT
TURNING FROM BACK TO SIDE WHILE IN FLAT BAD: A LITTLE
DAILY ACTIVITIY SCORE: 18
HELP NEEDED FOR BATHING: A LITTLE
STANDING UP FROM CHAIR USING ARMS: A LOT
WALKING IN HOSPITAL ROOM: A LITTLE
DRESSING REGULAR UPPER BODY CLOTHING: A LITTLE
STANDING UP FROM CHAIR USING ARMS: A LITTLE
TURNING FROM BACK TO SIDE WHILE IN FLAT BAD: A LOT
MOBILITY SCORE: 18
MOBILITY SCORE: 17
STANDING UP FROM CHAIR USING ARMS: A LITTLE
DRESSING REGULAR LOWER BODY CLOTHING: A LOT

## 2024-11-13 ASSESSMENT — PAIN SCALES - GENERAL
PAINLEVEL_OUTOF10: 8
PAINLEVEL_OUTOF10: 3
PAINLEVEL_OUTOF10: 4
PAINLEVEL_OUTOF10: 3
PAINLEVEL_OUTOF10: 7
PAINLEVEL_OUTOF10: 9
PAINLEVEL_OUTOF10: 1

## 2024-11-13 ASSESSMENT — PAIN - FUNCTIONAL ASSESSMENT
PAIN_FUNCTIONAL_ASSESSMENT: 0-10

## 2024-11-13 ASSESSMENT — PAIN DESCRIPTION - LOCATION: LOCATION: BACK

## 2024-11-13 NOTE — PROGRESS NOTES
Physical Therapy    Physical Therapy Treatment    Patient Name: Brannon Engel  MRN: [id number]  Department: [place] [address]  Room: [address] [place] [address]  Today's Date: 11/13/2024  Time Calculation  Start Time: 1358  Stop Time: 1422  Time Calculation (min): 24 min         Assessment/Plan   PT Assessment  End of Session Communication: Bedside nurse  Assessment Comment:  (pt demo fair umm to increased activity)  End of Session Patient Position: Up in chair, Alarm on  PT Plan  Inpatient/Swing Bed or Outpatient: Inpatient  PT Plan  Treatment/Interventions: Bed mobility, Transfer training, Gait training, Therapeutic activity, Therapeutic exercise  PT Plan: Ongoing PT  PT Frequency: 3 times per week  PT Discharge Recommendations: Moderate intensity level of continued care  PT Recommended Transfer Status: Assist x1  PT - OK to Discharge: Yes (per PT POC)      General Visit Information:   PT  Visit  PT Received On: 11/13/24  General  Reason for Referral: Pt is a 70 y/o male who presented to the ED s/p fall at [facility]. Imaging (-) for acute changes.  Referred By: DARIEN Sousa-CNP  Prior to Session Communication: Bedside nurse  Patient Position Received: Bed, 3 rail up, Alarm off, caregiver present  Preferred Learning Style: auditory, kinesthetic, verbal  General Comment:  (pt agreeable to tx.)    Subjective   Precautions:  Precautions  Medical Precautions: Fall precautions    Vital Signs (Past 2hrs)        Date/Time Vitals Session Patient Position Pulse Resp SpO2 BP MAP (mmHg)    11/13/24 1546 --  --  100  18  97 %  94/58  70                         Objective   Pain:  Pain Assessment  Pain Assessment: 0-10  0-10 (Numeric) Pain Score: 3  Pain Type: Chronic pain  Cognition:  Cognition  Orientation Level: Oriented X4  Coordination:     Postural Control:     Extremity/Trunk Assessments:    Activity Tolerance:     Treatments:  Therapeutic Exercise  Therapeutic Exercise Performed: Yes (pt performed seated ther ex:AP,QS,GS,Heel slides,LAQ x 15 reps  with BLE's)         Bed Mobility  Bed Mobility: Yes  Bed Mobility 1  Bed Mobility 1: Supine to sitting  Level of Assistance 1: Moderate assistance, Moderate verbal cues, Moderate tactile cues  Bed Mobility Comments 1:  (pt req increased cues for proper hand placement and sequencing)    Ambulation/Gait Training  Ambulation/Gait Training Performed: Yes  Ambulation/Gait Training 1  Surface 1: Level tile  Device 1: Rolling walker  Gait Support Devices: Gait belt  Assistance 1: Moderate assistance, Moderate verbal cues, Moderate tactile cues  Quality of Gait 1: Narrow base of support, Inconsistent stride length, Decreased step length, Antalgic  Comments/Distance (ft) 1: 4-5 short steps (pt req increased cues for proper FWW placement.)  Transfers  Transfer: Yes  Transfer 1  Transfer From 1: Sit to, Stand to  Technique 1: Sit to stand, Stand to sit  Transfer Device 1: Walker, Gait belt  Transfer Level of Assistance 1: Moderate assistance, Moderate verbal cues, Moderate tactile cues  Trials/Comments 1:  (pt req min cues for proper hand placement.)         Outcome Measures:  [facility] Basic Mobility  Turning from your back to your side while in a flat bed without using bedrails: A lot  Moving from lying on your back to sitting on the side of a flat bed without using bedrails: A lot  Moving to and from bed to chair (including a wheelchair): A lot  Standing up from a chair using your arms (e.g. wheelchair or bedside chair): A lot  To walk in hospital room: A lot  Climbing 3-5 steps with railing: Total  Basic Mobility - Total Score: 11    Education Documentation  Body Mechanics, taught by Huey Hudson PTA at 11/13/2024  4:19 PM.  Learner: Patient  Readiness: Acceptance  Method: Explanation  Response: Needs Reinforcement    Mobility Training, taught by Huey Hudson PTA at 11/13/2024  4:19 PM.  Learner: Patient  Readiness: Acceptance  Method: Explanation  Response: Needs Reinforcement    Education Comments  No  comments found.        OP EDUCATION:       Encounter Problems       Encounter Problems (Active)       Balance       Pt will tolerate 10+ mins dynamic standing balance activities with CGA or less and no LOB.        Start:  11/08/24    Expected End:  11/22/24               Mobility       STG - Patient will ambulate 50 ft with CGA and a RW.        Start:  11/08/24    Expected End:  11/22/24            Pt will tolerate 15 reps of each LE exercise in order to improve strength and endurance.         Start:  11/08/24    Expected End:  11/22/24               PT Transfers       STG - Patient will perform bed mobility independently. (Progressing)       Start:  11/08/24    Expected End:  11/22/24            STG - Patient will transfer sit to and from stand with CGA and a RW.  (Progressing)       Start:  11/08/24    Expected End:  11/22/24               Pain - Adult

## 2024-11-13 NOTE — PROGRESS NOTES
Report received from nightshift RN. Patient resting in bed, arousable to voice, oriented x3, with complaints of chronic hip/back pain registering 9/10 at this time. MAR reviewed for PRN medication pain management. Vital signs reviewed. Labs and orders reviewed. Will assume care of patient.

## 2024-11-13 NOTE — PROGRESS NOTES
Brannon Engel is a 69 y.o. male       Pt resting in bed  Nad  No complaints  Pain in hip controlled    Review of Systems       Vitals:    11/13/24 0746   BP: 116/69   Pulse: 89   Resp: 18   Temp: 36.7 °C (98 °F)   SpO2: 99%        Scheduled medications  aspirin, 81 mg, oral, Daily  atorvastatin, 80 mg, oral, Daily  calcium carbonate-vitamin D3, 1 tablet, oral, BID  cyanocobalamin, 1,000 mcg, oral, Daily  DULoxetine, 60 mg, oral, Daily  enoxaparin, 40 mg, subcutaneous, q24h  insulin glargine, 44 Units, subcutaneous, Nightly  insulin lispro, 6 Units, subcutaneous, TID  isosorbide mononitrate ER, 60 mg, oral, Daily  lidocaine, 1 patch, transdermal, Daily  lidocaine, 1 patch, transdermal, Daily  lithium ER, 300 mg, oral, BID  methocarbamol, 500 mg, oral, q8h VILMA  ondansetron ODT, 4 mg, oral, Once  pantoprazole, 20 mg, oral, Daily before breakfast  polyethylene glycol, 17 g, oral, Daily  pregabalin, 200 mg, oral, BID  QUEtiapine, 25 mg, oral, Nightly  tamsulosin, 0.4 mg, oral, Daily  traZODone, 50 mg, oral, Nightly  trihexyphenidyl, 2 mg, oral, BID      Continuous medications     PRN medications  PRN medications: acetaminophen **OR** acetaminophen **OR** acetaminophen, HYDROcodone-acetaminophen    Lab Review   Results from last 7 days   Lab Units 11/10/24  0507 11/09/24  0509 11/08/24  0536   WBC AUTO x10*3/uL 10.6 10.5 11.5*   HEMOGLOBIN g/dL 14.1 14.7 14.0   HEMATOCRIT % 43.4 46.3 42.8   PLATELETS AUTO x10*3/uL 267 264 265     Results from last 7 days   Lab Units 11/10/24  0507 11/09/24  0509 11/08/24  0536 11/07/24  1318   SODIUM mmol/L 137 130* 127* 129*   POTASSIUM mmol/L 3.9 4.1 3.5 5.0   CHLORIDE mmol/L 109* 100 98 99   CO2 mmol/L 21 23 19* 24   BUN mg/dL 10 12 12 9   CREATININE mg/dL 0.97 1.11 1.14 1.08   CALCIUM mg/dL 9.0 9.7 9.7 10.0   PROTEIN TOTAL g/dL  --   --   --  7.1   BILIRUBIN TOTAL mg/dL  --   --   --  0.6   ALK PHOS U/L  --   --   --  99   ALT U/L  --   --   --  25   AST U/L  --   --   --  37    GLUCOSE mg/dL 93 74 127* 90            CT pelvis wo IV contrast   Final Result   No acute fracture or suspicious osseous lesions.   Mild osteoarthritis of the right hip.   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        Signed by: Cali Martinez 11/8/2024 3:59 PM   Dictation workstation:   [id number]      XR hip right with pelvis when performed 2 or 3 views   Final Result   Intact total left hip arthroplasty. Degenerative changes of the right   hip joint. No acute fracture or subluxation.        Signed by: Brendan Taylor 11/7/2024 11:42 AM   Dictation workstation:   [id number]      CT head wo IV contrast   Final Result   No evidence of acute cortical infarct or intracranial hemorrhage.        Chronic changes as described.        MACRO:   None        Signed by: Dagoberto Trejo 11/7/2024 11:14 AM   Dictation workstation:   [id number]      CT cervical spine wo IV contrast   Final Result   No evidence for an acute fracture or subluxation of the cervical   spine.        MACRO:   None        Signed by: Dagoberto Trejo 11/7/2024 11:34 AM   Dictation workstation:   [id number]            Physical Exam    Constitutional   General appearance: Alert and in no acute distress.   Pulmonary   Respiratory assessment: No respiratory distress, normal respiratory rhythm and effort.    Auscultation of Lungs: Clear bilateral breath sounds.   Cardiovascular   Auscultation of heart: Apical pulse normal, heart rate and rhythm normal, normal S1 and S2, no murmurs and no pericardial rub.    Exam for edema: No peripheral edema.   Abdomen   Abdominal Exam: No bruits, normal bowel sounds, soft, non-tender, no abdominal mass palpated.    Liver and Spleen exam: No hepato-splenomegaly.   Musculoskeletal   Moves all extremities  Neurologic   Cranial nerves: Nerves 2-12 were intact, no focal neuro defects.      pt has jc prior to admission      Assessment/Plan      #Urinary tract infection  Stopped  rocephin    #Hyponatremia  Has resolved    #Chronic hip pain  From chronic osteoarthritis  Lidocaine patch/pain control  PT OT consulted    #Schizophrenia  Continue home medications    #Diabetes mellitus  Sliding scale insulin coverage    #Hypertension  Stable continue home medications    PTOT    Pt stable for dc when arranged to snf  No antibiotics   Cont with jc     Plan of care discussed with: Provider, RN, Patient  Pt cleared for dc      Shiv De La Rosa MD    .

## 2024-11-13 NOTE — PROGRESS NOTES
"Occupational Therapy                 Therapy Communication Note    Patient Name: Brannon Engel  MRN: [id number]  Department: [place] [address]  Room: [address] [place] [address]  Today's Date: 11/13/2024     Discipline: Occupational Therapy    Missed Visit Reason: Missed Visit Reason: Patient refused (pt declining therapy at this time d/t fatigue and feeling \"sore.\" Despite encouragement, pt continued to decline. RN notified.)    Missed Time: Attempt      "

## 2024-11-13 NOTE — CARE PLAN
The patient's goals for the shift include  restful night of sleep and management of pain.    The clinical goals for the shift include  patient will remain HD stable and free from falls.

## 2024-11-13 NOTE — PROGRESS NOTES
"   11/13/24 0917   Discharge Planning   Who is requesting discharge planning? Patient   Expected Discharge Disposition SNF     Per conversation with  , patient's daughter would like referrals sent to [facility], patient's daughter stated she wasn't aware she had a choice of facilities, per notes on 11/9 choice list was emailed to patient's daughter and per notes on 11/10, TCC followed up with patient's daughter who stated [facility] was [facility], I have sent a referral over to [facility] for facility to review. I will continue to monitor for discharge planning.    11:12 I spoke to the patient at his bedside in regards to discharge planning, patient seemed less confused today than he did the other day, I had asked him if he had spoken to his daughter Nathan in regards to facilities for placement on discharge, he stated that he has not talked to her recently, I did let him know that she was looking into [facility] however they do not have any beds, I asked him if he had talked to anyone in regards to discharging to [facility], he stated no. I asked him if he makes his own decisions in regards to placement or if his daughter makes those decisions for him, he stated \"she tells me where to go, and I go there.\"  I asked the patient if he was interested in going to [facility] he stated \" I don't care where I go as long as it has a star rating.\" Patient's daughter is also scheduled to have a procedure at [facility] so it may be difficult to reach her in regards to discharge planning, I will continue to monitor.    12:35 I called and left a message for the patients daughter at [phone] in regards to patient receiving insurance auth for [facility], and that [facility] does not have any beds at this time, I am reaching out to facility to see when auth will exp, will wait for patient's daughter to call me back with facility information. Auth " will exp 11/15

## 2024-11-13 NOTE — CARE PLAN
The patient's goals for the shift include  safety.    The clinical goals for the shift include patient will remain safe and free from falls this shift    Over the shift, the patient did make progress toward the following goals.      Problem: Pain - Adult  Goal: Verbalizes/displays adequate comfort level or baseline comfort level  Outcome: Progressing     Problem: Safety - Adult  Goal: Free from fall injury  Outcome: Progressing     Problem: Discharge Planning  Goal: Discharge to home or other facility with appropriate resources  Outcome: Progressing     Problem: Chronic Conditions and Co-morbidities  Goal: Patient's chronic conditions and co-morbidity symptoms are monitored and maintained or improved  Outcome: Progressing     Problem: Fall/Injury  Goal: Not fall by end of shift  Outcome: Progressing  Goal: Be free from injury by end of the shift  Outcome: Progressing  Goal: Verbalize understanding of personal risk factors for fall in the hospital  Outcome: Progressing  Goal: Verbalize understanding of risk factor reduction measures to prevent injury from fall in the home  Outcome: Progressing  Goal: Use assistive devices by end of the shift  Outcome: Progressing  Goal: Pace activities to prevent fatigue by end of the shift  Outcome: Progressing     Problem: Pain  Goal: Takes deep breaths with improved pain control throughout the shift  Outcome: Progressing  Goal: Turns in bed with improved pain control throughout the shift  Outcome: Progressing  Goal: Walks with improved pain control throughout the shift  Outcome: Progressing  Goal: Performs ADL's with improved pain control throughout shift  Outcome: Progressing  Goal: Participates in PT with improved pain control throughout the shift  Outcome: Progressing  Goal: Free from opioid side effects throughout the shift  Outcome: Progressing  Goal: Free from acute confusion related to pain meds throughout the shift  Outcome: Progressing     Problem: Skin  Goal: Decreased  wound size/increased tissue granulation at next dressing change  Outcome: Progressing  Goal: Participates in plan/prevention/treatment measures  Outcome: Progressing  Goal: Prevent/manage excess moisture  Outcome: Progressing  Goal: Prevent/minimize sheer/friction injuries  Outcome: Progressing  Goal: Promote/optimize nutrition  Outcome: Progressing  Goal: Promote skin healing  Outcome: Progressing

## 2024-11-14 LAB
GLUCOSE BLD MANUAL STRIP-MCNC: 131 MG/DL (ref 74–99)
GLUCOSE BLD MANUAL STRIP-MCNC: 139 MG/DL (ref 74–99)
GLUCOSE BLD MANUAL STRIP-MCNC: 174 MG/DL (ref 74–99)
GLUCOSE BLD MANUAL STRIP-MCNC: 90 MG/DL (ref 74–99)

## 2024-11-14 PROCEDURE — 2500000004 HC RX 250 GENERAL PHARMACY W/ HCPCS (ALT 636 FOR OP/ED): Performed by: NURSE PRACTITIONER

## 2024-11-14 PROCEDURE — 2500000001 HC RX 250 WO HCPCS SELF ADMINISTERED DRUGS (ALT 637 FOR MEDICARE OP): Performed by: FAMILY MEDICINE

## 2024-11-14 PROCEDURE — 2500000002 HC RX 250 W HCPCS SELF ADMINISTERED DRUGS (ALT 637 FOR MEDICARE OP, ALT 636 FOR OP/ED): Performed by: NURSE PRACTITIONER

## 2024-11-14 PROCEDURE — 1100000001 HC PRIVATE ROOM DAILY

## 2024-11-14 PROCEDURE — 2500000002 HC RX 250 W HCPCS SELF ADMINISTERED DRUGS (ALT 637 FOR MEDICARE OP, ALT 636 FOR OP/ED): Performed by: FAMILY MEDICINE

## 2024-11-14 PROCEDURE — G0378 HOSPITAL OBSERVATION PER HR: HCPCS

## 2024-11-14 PROCEDURE — 82947 ASSAY GLUCOSE BLOOD QUANT: CPT

## 2024-11-14 PROCEDURE — 97530 THERAPEUTIC ACTIVITIES: CPT | Mod: GO | Performed by: OCCUPATIONAL THERAPIST

## 2024-11-14 PROCEDURE — 2500000001 HC RX 250 WO HCPCS SELF ADMINISTERED DRUGS (ALT 637 FOR MEDICARE OP): Performed by: NURSE PRACTITIONER

## 2024-11-14 RX ADMIN — METHOCARBAMOL TABLETS 500 MG: 500 TABLET, COATED ORAL at 23:10

## 2024-11-14 RX ADMIN — QUETIAPINE FUMARATE 25 MG: 25 TABLET ORAL at 21:01

## 2024-11-14 RX ADMIN — TRIHEXYPHENIDYL HYDROCHLORIDE 2 MG: 2 TABLET ORAL at 09:23

## 2024-11-14 RX ADMIN — ISOSORBIDE MONONITRATE 60 MG: 30 TABLET, EXTENDED RELEASE ORAL at 09:24

## 2024-11-14 RX ADMIN — PREGABALIN 200 MG: 100 CAPSULE ORAL at 21:01

## 2024-11-14 RX ADMIN — TAMSULOSIN HYDROCHLORIDE 0.4 MG: 0.4 CAPSULE ORAL at 09:24

## 2024-11-14 RX ADMIN — TRAZODONE HYDROCHLORIDE 50 MG: 50 TABLET ORAL at 21:01

## 2024-11-14 RX ADMIN — INSULIN GLARGINE 44 UNITS: 100 INJECTION, SOLUTION SUBCUTANEOUS at 21:01

## 2024-11-14 RX ADMIN — HYDROCODONE BITARTRATE AND ACETAMINOPHEN 1 TABLET: 5; 325 TABLET ORAL at 12:36

## 2024-11-14 RX ADMIN — METHOCARBAMOL TABLETS 500 MG: 500 TABLET, COATED ORAL at 09:24

## 2024-11-14 RX ADMIN — TRIHEXYPHENIDYL HYDROCHLORIDE 2 MG: 2 TABLET ORAL at 17:03

## 2024-11-14 RX ADMIN — PANTOPRAZOLE SODIUM 20 MG: 40 TABLET, DELAYED RELEASE ORAL at 09:23

## 2024-11-14 RX ADMIN — INSULIN LISPRO 6 UNITS: 100 INJECTION, SOLUTION INTRAVENOUS; SUBCUTANEOUS at 17:03

## 2024-11-14 RX ADMIN — Medication 1000 MCG: at 09:25

## 2024-11-14 RX ADMIN — Medication 1 TABLET: at 09:23

## 2024-11-14 RX ADMIN — LITHIUM CARBONATE 300 MG: 300 TABLET, EXTENDED RELEASE ORAL at 21:01

## 2024-11-14 RX ADMIN — METHOCARBAMOL TABLETS 500 MG: 500 TABLET, COATED ORAL at 17:03

## 2024-11-14 RX ADMIN — ATORVASTATIN CALCIUM 80 MG: 80 TABLET, FILM COATED ORAL at 09:24

## 2024-11-14 RX ADMIN — PREGABALIN 200 MG: 100 CAPSULE ORAL at 09:25

## 2024-11-14 RX ADMIN — DULOXETINE HYDROCHLORIDE 60 MG: 30 CAPSULE, DELAYED RELEASE ORAL at 09:23

## 2024-11-14 RX ADMIN — HYDROCODONE BITARTRATE AND ACETAMINOPHEN 1 TABLET: 5; 325 TABLET ORAL at 21:01

## 2024-11-14 RX ADMIN — LITHIUM CARBONATE 300 MG: 300 TABLET, EXTENDED RELEASE ORAL at 09:25

## 2024-11-14 RX ADMIN — ENOXAPARIN SODIUM 40 MG: 40 INJECTION SUBCUTANEOUS at 17:03

## 2024-11-14 RX ADMIN — ASPIRIN 81 MG: 81 TABLET, COATED ORAL at 09:23

## 2024-11-14 RX ADMIN — INSULIN LISPRO 6 UNITS: 100 INJECTION, SOLUTION INTRAVENOUS; SUBCUTANEOUS at 09:17

## 2024-11-14 RX ADMIN — Medication 1 TABLET: at 21:01

## 2024-11-14 ASSESSMENT — COGNITIVE AND FUNCTIONAL STATUS - GENERAL
STANDING UP FROM CHAIR USING ARMS: A LITTLE
PERSONAL GROOMING: A LITTLE
PERSONAL GROOMING: A LITTLE
WALKING IN HOSPITAL ROOM: A LOT
TOILETING: A LITTLE
DRESSING REGULAR UPPER BODY CLOTHING: A LITTLE
MOVING TO AND FROM BED TO CHAIR: A LITTLE
CLIMB 3 TO 5 STEPS WITH RAILING: A LOT
HELP NEEDED FOR BATHING: A LITTLE
PERSONAL GROOMING: A LITTLE
DAILY ACTIVITIY SCORE: 19
MOBILITY SCORE: 17
MOVING TO AND FROM BED TO CHAIR: A LITTLE
HELP NEEDED FOR BATHING: A LITTLE
STANDING UP FROM CHAIR USING ARMS: A LITTLE
DAILY ACTIVITIY SCORE: 18
CLIMB 3 TO 5 STEPS WITH RAILING: A LOT
MOBILITY SCORE: 18
TOILETING: A LITTLE
DRESSING REGULAR LOWER BODY CLOTHING: A LOT
DRESSING REGULAR LOWER BODY CLOTHING: A LOT
DAILY ACTIVITIY SCORE: 15
DRESSING REGULAR UPPER BODY CLOTHING: A LITTLE
WALKING IN HOSPITAL ROOM: A LOT
TOILETING: A LOT
DRESSING REGULAR UPPER BODY CLOTHING: A LITTLE
TURNING FROM BACK TO SIDE WHILE IN FLAT BAD: A LITTLE
EATING MEALS: A LITTLE
DRESSING REGULAR LOWER BODY CLOTHING: A LITTLE
HELP NEEDED FOR BATHING: A LOT

## 2024-11-14 ASSESSMENT — PAIN - FUNCTIONAL ASSESSMENT
PAIN_FUNCTIONAL_ASSESSMENT: 0-10

## 2024-11-14 ASSESSMENT — PAIN DESCRIPTION - DESCRIPTORS
DESCRIPTORS: ACHING
DESCRIPTORS: ACHING

## 2024-11-14 ASSESSMENT — PAIN SCALES - GENERAL
PAINLEVEL_OUTOF10: 8
PAINLEVEL_OUTOF10: 7
PAINLEVEL_OUTOF10: 3
PAINLEVEL_OUTOF10: 10 - WORST POSSIBLE PAIN
PAINLEVEL_OUTOF10: 0 - NO PAIN

## 2024-11-14 ASSESSMENT — PAIN DESCRIPTION - LOCATION: LOCATION: GROIN

## 2024-11-14 NOTE — PROGRESS NOTES
Occupational Therapy    OT Treatment    Patient Name: Brannon Engel  MRN: [id number]  Department: [place] [address]  Room: [address] [place] [address]  Today's Date: 11/14/2024  Time Calculation  Start Time: 1542  Stop Time: 1555  Time Calculation (min): 13 min        Assessment:  End of Session Communication: Bedside nurse  End of Session Patient Position: Up in chair, Alarm on     Plan:  Treatment Interventions: ADL retraining, Functional transfer training, UE strengthening/ROM, Endurance training, Cognitive reorientation, Patient/family training, Equipment evaluation/education, Neuromuscular reeducation, Compensatory technique education  OT Frequency: 3 times per week  OT Discharge Recommendations: Moderate intensity level of continued care  Equipment Recommended upon Discharge:  (TBD)  OT Recommended Transfer Status: Moderate assist, Assist of 1  OT - OK to Discharge: Yes (continue per OT POC)  Treatment Interventions: ADL retraining, Functional transfer training, UE strengthening/ROM, Endurance training, Cognitive reorientation, Patient/family training, Equipment evaluation/education, Neuromuscular reeducation, Compensatory technique education    Subjective   Previous Visit Info:  OT Last Visit  OT Received On: 11/14/24  General:  General  Reason for Referral: Pt is a 68 y/o male who presented to the ED s/p fall at [facility]. Imaging (-) for acute changes.  Referred By: DARIEN Sousa-CNP  Past Medical History Relevant to Rehab:   Past Medical History:   Diagnosis Date    Dysphagia     Emphysema lung (Multi)     GERD (gastroesophageal reflux disease)     HLD (hyperlipidemia)     Hypertension, essential     Low back pain     PVD (peripheral vascular disease) (CMS-HCC)     Schizophrenia     Type 2 diabetes mellitus       Family/Caregiver Present: No  Prior to Session Communication: Bedside nurse  Patient Position Received: Up in chair, Alarm on  General Comment: Pt seated in chair upon arrival and agreeable to treatment. Pt declines all ADLs  this date despite education/encouragement.  Precautions:  Medical Precautions: Fall precautions          Pain:  Pain Assessment  Pain Assessment: 0-10  0-10 (Numeric) Pain Score: 8  Pain Location: Buttocks  Pain Orientation: Right, Left  Pain Interventions: Repositioned    Objective    Cognition:  Cognition  Overall Cognitive Status: Impaired  Orientation Level: Disoriented to time         Bed Mobility/Transfers: Bed Mobility  Bed Mobility: Yes  Bed Mobility 1  Bed Mobility 1: Supine to sitting  Level of Assistance 1: Minimum assistance, Moderate verbal cues  Bed Mobility Comments 1: assist at trunk, cues for sequencing, extra time to complete    Transfers  Transfer: Yes  Transfer 1  Technique 1: Sit to stand, Stand to sit  Transfer Device 1: Gait belt, Walker  Transfer Level of Assistance 1: Moderate assistance, Moderate verbal cues  Trials/Comments 1: cues for sequencing, safe hand placement and walker safety, from EOB, pt declined sit>stand from chair  Transfers 2  Transfer From 2: Bed to  Transfer to 2: Chair with arms  Technique 2: Stand pivot  Transfer Device 2: Gait belt, Walker  Transfer Level of Assistance 2: Minimum assistance, Moderate verbal cues  Trials/Comments 2: cues for sequencing, walker safety and alignment to chair, assist for balance and safe walker management         Sitting Balance:  Static Sitting Balance  Static Sitting-Balance Support: Bilateral upper extremity supported, Feet supported  Static Sitting-Level of Assistance: Close supervision  Static Sitting-Comment/Number of Minutes: at EOB  Standing Balance:  Static Standing Balance  Static Standing-Balance Support: Bilateral upper extremity supported  Static Standing-Level of Assistance: Contact guard, Minimum assistance  Static Standing-Comment/Number of Minutes: FWW  Dynamic Standing Balance  Dynamic Standing-Comments: Min A      Outcome Measures:[facility] Daily Activity  Putting on and taking off regular lower body clothing: A lot  Bathing  (including washing, rinsing, drying): A lot  Putting on and taking off regular upper body clothing: A little  Toileting, which includes using toilet, bedpan or urinal: A lot  Taking care of personal grooming such as brushing teeth: A little  Eating Meals: A little  Daily Activity - Total Score: 15        Education Documentation  Body Mechanics, taught by Edith Low OT at 11/14/2024  4:14 PM.  Learner: Patient  Readiness: Acceptance  Method: Explanation  Response: Verbalizes Understanding, Needs Reinforcement    Precautions, taught by Edith Low OT at 11/14/2024  4:14 PM.  Learner: Patient  Readiness: Acceptance  Method: Explanation  Response: Verbalizes Understanding, Needs Reinforcement    ADL Training, taught by Edith Low OT at 11/14/2024  4:14 PM.  Learner: Patient  Readiness: Acceptance  Method: Explanation  Response: Verbalizes Understanding, Needs Reinforcement    Education Comments  No comments found.           Goals:  Encounter Problems       Encounter Problems (Active)       ADLs       Patient will perform UB and LB bathing with minimal assist  level of assistance and grab bars, shower chair, and long-handled sponge. (Progressing)       Start:  11/08/24    Expected End:  11/22/24            Patient with complete upper body dressing with supervision level of assistance donning and doffing all UE clothes with PRN adaptive equipment. (Progressing)       Start:  11/08/24    Expected End:  11/22/24            Patient with complete lower body dressing with minimal assist  level of assistance donning and doffing all LE clothes  with PRN adaptive equipment. (Progressing)       Start:  11/08/24    Expected End:  11/22/24            Patient will complete daily grooming tasks with set-up level of assistance and PRN adaptive equipment. (Progressing)       Start:  11/08/24    Expected End:  11/22/24            Patient will complete toileting including hygiene clothing management/hygiene with minimal  assist  level of assistance and raised toilet seat and grab bars vs. BSC. (Progressing)       Start:  11/08/24    Expected End:  11/22/24               MOBILITY       Patient will perform Functional mobility x Household distances/Community Distances with contact guard assist level of assistance and least restrictive device in order to improve safety and functional mobility. (Progressing)       Start:  11/08/24    Expected End:  11/22/24               TRANSFERS       Patient will perform bed mobility modified independent level of assistance in order to improve safety and independence with mobility (Progressing)       Start:  11/08/24    Expected End:  11/22/24            Patient will complete functional transfers with least restrictive device with contact guard assist level of assistance. (Progressing)       Start:  11/08/24    Expected End:  11/22/24

## 2024-11-14 NOTE — PROGRESS NOTES
11/14/24 0856   Discharge Planning   Expected Discharge Disposition SNF     I did talk to the patient's daughter Nathan at [phone] in regards to discharge planning, she stated when she talked to [facility] and made the decision for [facility], she was told the patient is discharging that day and he had to have a place to go, she stated she was under the impression she had to make a decision on placement that day. She stated she wanted to make a change to [facility] because when she had asked to change facilities the patient had not been discharged yet and she stated his A.[facility] facility had recommended the best facility for what the patient will need in regards to PT/OT would be [facility], patient's daughter stated she really would like patient to go to [facility] and if he was to discharge to [facility] she would not be able to help and she does not believe he would get the therapy he needs to help him return to baseline.    09:24 requested advanced [facility] to start new auth for patient

## 2024-11-14 NOTE — CARE PLAN
The patient's goals for the shift include  minal rest  Problem: Pain - Adult  Goal: Verbalizes/displays adequate comfort level or baseline comfort level  Outcome: Progressing     Problem: Safety - Adult  Goal: Free from fall injury  Outcome: Progressing     Problem: Discharge Planning  Goal: Discharge to home or other facility with appropriate resources  Outcome: Progressing     Problem: Chronic Conditions and Co-morbidities  Goal: Patient's chronic conditions and co-morbidity symptoms are monitored and maintained or improved  Outcome: Progressing     Problem: Fall/Injury  Goal: Not fall by end of shift  Outcome: Progressing  Goal: Be free from injury by end of the shift  Outcome: Progressing  Goal: Verbalize understanding of personal risk factors for fall in the hospital  Outcome: Progressing  Goal: Verbalize understanding of risk factor reduction measures to prevent injury from fall in the home  Outcome: Progressing  Goal: Use assistive devices by end of the shift  Outcome: Progressing  Goal: Pace activities to prevent fatigue by end of the shift  Outcome: Progressing     Problem: Pain  Goal: Takes deep breaths with improved pain control throughout the shift  Outcome: Progressing  Goal: Turns in bed with improved pain control throughout the shift  Outcome: Progressing  Goal: Walks with improved pain control throughout the shift  Outcome: Progressing  Goal: Performs ADL's with improved pain control throughout shift  Outcome: Progressing  Goal: Participates in PT with improved pain control throughout the shift  Outcome: Progressing  Goal: Free from opioid side effects throughout the shift  Outcome: Progressing  Goal: Free from acute confusion related to pain meds throughout the shift  Outcome: Progressing     Problem: Skin  Goal: Decreased wound size/increased tissue granulation at next dressing change  Outcome: Progressing  Goal: Participates in plan/prevention/treatment measures  Outcome: Progressing  Goal:  Prevent/manage excess moisture  Outcome: Progressing  Goal: Prevent/minimize sheer/friction injuries  Outcome: Progressing  Goal: Promote/optimize nutrition  Outcome: Progressing  Goal: Promote skin healing  Outcome: Progressing       The clinical goals for the shift include safety

## 2024-11-14 NOTE — CARE PLAN
The clinical goals for the shift include safety    Problem: Pain - Adult  Goal: Verbalizes/displays adequate comfort level or baseline comfort level  Outcome: Progressing     Problem: Safety - Adult  Goal: Free from fall injury  Outcome: Progressing     Problem: Discharge Planning  Goal: Discharge to home or other facility with appropriate resources  Outcome: Progressing     Problem: Chronic Conditions and Co-morbidities  Goal: Patient's chronic conditions and co-morbidity symptoms are monitored and maintained or improved  Outcome: Progressing     Problem: Fall/Injury  Goal: Not fall by end of shift  Outcome: Progressing  Goal: Be free from injury by end of the shift  Outcome: Progressing  Goal: Verbalize understanding of personal risk factors for fall in the hospital  Outcome: Progressing  Goal: Verbalize understanding of risk factor reduction measures to prevent injury from fall in the home  Outcome: Progressing  Goal: Use assistive devices by end of the shift  Outcome: Progressing  Goal: Pace activities to prevent fatigue by end of the shift  Outcome: Progressing     Problem: Pain  Goal: Takes deep breaths with improved pain control throughout the shift  Outcome: Progressing  Goal: Turns in bed with improved pain control throughout the shift  Outcome: Progressing  Goal: Walks with improved pain control throughout the shift  Outcome: Progressing  Goal: Performs ADL's with improved pain control throughout shift  Outcome: Progressing  Goal: Participates in PT with improved pain control throughout the shift  Outcome: Progressing  Goal: Free from opioid side effects throughout the shift  Outcome: Progressing  Goal: Free from acute confusion related to pain meds throughout the shift  Outcome: Progressing     Problem: Skin  Goal: Decreased wound size/increased tissue granulation at next dressing change  Outcome: Progressing  Goal: Participates in plan/prevention/treatment measures  Outcome: Progressing  Goal:  Prevent/manage excess moisture  Outcome: Progressing  Goal: Prevent/minimize sheer/friction injuries  Outcome: Progressing  Goal: Promote/optimize nutrition  Outcome: Progressing  Goal: Promote skin healing  Outcome: Progressing

## 2024-11-14 NOTE — PROGRESS NOTES
Brannon Engel is a 69 y.o. male       Pt resting in bed  Nad  No complaints  Pain in hip controlled    Review of Systems       Vitals:    11/14/24 1158   BP: 91/59   Pulse: 92   Resp: 18   Temp: 36.8 °C (98.3 °F)   SpO2: 98%        Scheduled medications  aspirin, 81 mg, oral, Daily  atorvastatin, 80 mg, oral, Daily  calcium carbonate-vitamin D3, 1 tablet, oral, BID  cyanocobalamin, 1,000 mcg, oral, Daily  DULoxetine, 60 mg, oral, Daily  enoxaparin, 40 mg, subcutaneous, q24h  insulin glargine, 44 Units, subcutaneous, Nightly  insulin lispro, 6 Units, subcutaneous, TID  isosorbide mononitrate ER, 60 mg, oral, Daily  lidocaine, 1 patch, transdermal, Daily  lidocaine, 1 patch, transdermal, Daily  lithium ER, 300 mg, oral, BID  methocarbamol, 500 mg, oral, q8h VILMA  ondansetron ODT, 4 mg, oral, Once  pantoprazole, 20 mg, oral, Daily before breakfast  polyethylene glycol, 17 g, oral, Daily  pregabalin, 200 mg, oral, BID  QUEtiapine, 25 mg, oral, Nightly  tamsulosin, 0.4 mg, oral, Daily  traZODone, 50 mg, oral, Nightly  trihexyphenidyl, 2 mg, oral, BID      Continuous medications     PRN medications  PRN medications: acetaminophen **OR** acetaminophen **OR** acetaminophen, HYDROcodone-acetaminophen    Lab Review   Results from last 7 days   Lab Units 11/10/24  0507 11/09/24  0509 11/08/24  0536   WBC AUTO x10*3/uL 10.6 10.5 11.5*   HEMOGLOBIN g/dL 14.1 14.7 14.0   HEMATOCRIT % 43.4 46.3 42.8   PLATELETS AUTO x10*3/uL 267 264 265     Results from last 7 days   Lab Units 11/10/24  0507 11/09/24  0509 11/08/24  0536   SODIUM mmol/L 137 130* 127*   POTASSIUM mmol/L 3.9 4.1 3.5   CHLORIDE mmol/L 109* 100 98   CO2 mmol/L 21 23 19*   BUN mg/dL 10 12 12   CREATININE mg/dL 0.97 1.11 1.14   CALCIUM mg/dL 9.0 9.7 9.7   GLUCOSE mg/dL 93 74 127*            CT pelvis wo IV contrast   Final Result   No acute fracture or suspicious osseous lesions.   Mild osteoarthritis of the right hip.   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        Signed by: Cali Martinez 11/8/2024 3:59 PM   Dictation workstation:   [id number]      XR hip right with pelvis when performed 2 or 3 views   Final Result   Intact total left hip arthroplasty. Degenerative changes of the right   hip joint. No acute fracture or subluxation.        Signed by: Brendan Taylor 11/7/2024 11:42 AM   Dictation workstation:   [id number]      CT head wo IV contrast   Final Result   No evidence of acute cortical infarct or intracranial hemorrhage.        Chronic changes as described.        MACRO:   None        Signed by: Dagoberto Trejo 11/7/2024 11:14 AM   Dictation workstation:   [id number]      CT cervical spine wo IV contrast   Final Result   No evidence for an acute fracture or subluxation of the cervical   spine.        MACRO:   None        Signed by: Dagoberto Trejo 11/7/2024 11:34 AM   Dictation workstation:   [id number]            Physical Exam    Constitutional   General appearance: Alert and in no acute distress.   Pulmonary   Respiratory assessment: No respiratory distress, normal respiratory rhythm and effort.    Auscultation of Lungs: Clear bilateral breath sounds.   Cardiovascular   Auscultation of heart: Apical pulse normal, heart rate and rhythm normal, normal S1 and S2, no murmurs and no pericardial rub.    Exam for edema: No peripheral edema.   Abdomen   Abdominal Exam: No bruits, normal bowel sounds, soft, non-tender, no abdominal mass palpated.    Liver and Spleen exam: No hepato-splenomegaly.   Musculoskeletal   Moves all extremities  Neurologic   Cranial nerves: Nerves 2-12 were intact, no focal neuro defects.      pt has jc prior to admission      Assessment/Plan      #Urinary tract infection  Stopped rocephin    #Hyponatremia  Has resolved    #Chronic hip pain  From chronic osteoarthritis  Lidocaine patch/pain control  PT OT consulted    #Schizophrenia  Continue home medications    #Diabetes mellitus  Sliding scale insulin  coverage    #Hypertension  Stable continue home medications    PTOT    Pt stable for dc when arranged to snf  No antibiotics   Cont with jc     Plan of care discussed with: Provider, RN, Patient  Pt cleared for dc  Waiting for facility       Shiv De La Rosa MD    .

## 2024-11-14 NOTE — PROGRESS NOTES
11/14/24 1049   Discharge Planning   Expected Discharge Disposition SNF     KENTON spoke with daughter. She is aware that auth is being started at [facility]. Auth was previously approved for [place] at [place], daughter changed her mind. Daughter dealing with own medical situation, sw provided support. SW will follow for any additional social or discharge planning needs.

## 2024-11-15 LAB
GLUCOSE BLD MANUAL STRIP-MCNC: 106 MG/DL (ref 74–99)
GLUCOSE BLD MANUAL STRIP-MCNC: 138 MG/DL (ref 74–99)
GLUCOSE BLD MANUAL STRIP-MCNC: 138 MG/DL (ref 74–99)
GLUCOSE BLD MANUAL STRIP-MCNC: 156 MG/DL (ref 74–99)

## 2024-11-15 PROCEDURE — 2500000001 HC RX 250 WO HCPCS SELF ADMINISTERED DRUGS (ALT 637 FOR MEDICARE OP): Performed by: FAMILY MEDICINE

## 2024-11-15 PROCEDURE — 82947 ASSAY GLUCOSE BLOOD QUANT: CPT

## 2024-11-15 PROCEDURE — 2500000002 HC RX 250 W HCPCS SELF ADMINISTERED DRUGS (ALT 637 FOR MEDICARE OP, ALT 636 FOR OP/ED): Performed by: FAMILY MEDICINE

## 2024-11-15 PROCEDURE — 2500000004 HC RX 250 GENERAL PHARMACY W/ HCPCS (ALT 636 FOR OP/ED): Performed by: NURSE PRACTITIONER

## 2024-11-15 PROCEDURE — 2500000001 HC RX 250 WO HCPCS SELF ADMINISTERED DRUGS (ALT 637 FOR MEDICARE OP): Performed by: NURSE PRACTITIONER

## 2024-11-15 PROCEDURE — 2500000002 HC RX 250 W HCPCS SELF ADMINISTERED DRUGS (ALT 637 FOR MEDICARE OP, ALT 636 FOR OP/ED): Performed by: NURSE PRACTITIONER

## 2024-11-15 PROCEDURE — 1100000001 HC PRIVATE ROOM DAILY

## 2024-11-15 PROCEDURE — G0378 HOSPITAL OBSERVATION PER HR: HCPCS

## 2024-11-15 RX ADMIN — TRIHEXYPHENIDYL HYDROCHLORIDE 2 MG: 2 TABLET ORAL at 08:31

## 2024-11-15 RX ADMIN — METHOCARBAMOL TABLETS 500 MG: 500 TABLET, COATED ORAL at 21:19

## 2024-11-15 RX ADMIN — INSULIN LISPRO 6 UNITS: 100 INJECTION, SOLUTION INTRAVENOUS; SUBCUTANEOUS at 12:32

## 2024-11-15 RX ADMIN — Medication 1000 MCG: at 08:32

## 2024-11-15 RX ADMIN — HYDROCODONE BITARTRATE AND ACETAMINOPHEN 1 TABLET: 5; 325 TABLET ORAL at 17:10

## 2024-11-15 RX ADMIN — PANTOPRAZOLE SODIUM 20 MG: 40 TABLET, DELAYED RELEASE ORAL at 05:12

## 2024-11-15 RX ADMIN — QUETIAPINE FUMARATE 25 MG: 25 TABLET ORAL at 20:46

## 2024-11-15 RX ADMIN — Medication 1 TABLET: at 20:46

## 2024-11-15 RX ADMIN — PREGABALIN 200 MG: 100 CAPSULE ORAL at 08:32

## 2024-11-15 RX ADMIN — METHOCARBAMOL TABLETS 500 MG: 500 TABLET, COATED ORAL at 05:12

## 2024-11-15 RX ADMIN — ATORVASTATIN CALCIUM 80 MG: 80 TABLET, FILM COATED ORAL at 08:31

## 2024-11-15 RX ADMIN — INSULIN LISPRO 6 UNITS: 100 INJECTION, SOLUTION INTRAVENOUS; SUBCUTANEOUS at 08:35

## 2024-11-15 RX ADMIN — ISOSORBIDE MONONITRATE 60 MG: 30 TABLET, EXTENDED RELEASE ORAL at 08:32

## 2024-11-15 RX ADMIN — LITHIUM CARBONATE 300 MG: 300 TABLET, EXTENDED RELEASE ORAL at 20:46

## 2024-11-15 RX ADMIN — POLYETHYLENE GLYCOL 3350 17 G: 17 POWDER, FOR SOLUTION ORAL at 08:32

## 2024-11-15 RX ADMIN — INSULIN GLARGINE 44 UNITS: 100 INJECTION, SOLUTION SUBCUTANEOUS at 20:46

## 2024-11-15 RX ADMIN — Medication 1 TABLET: at 08:32

## 2024-11-15 RX ADMIN — ENOXAPARIN SODIUM 40 MG: 40 INJECTION SUBCUTANEOUS at 17:10

## 2024-11-15 RX ADMIN — TRIHEXYPHENIDYL HYDROCHLORIDE 2 MG: 2 TABLET ORAL at 17:10

## 2024-11-15 RX ADMIN — INSULIN LISPRO 6 UNITS: 100 INJECTION, SOLUTION INTRAVENOUS; SUBCUTANEOUS at 17:11

## 2024-11-15 RX ADMIN — TAMSULOSIN HYDROCHLORIDE 0.4 MG: 0.4 CAPSULE ORAL at 08:30

## 2024-11-15 RX ADMIN — DULOXETINE HYDROCHLORIDE 60 MG: 30 CAPSULE, DELAYED RELEASE ORAL at 08:31

## 2024-11-15 RX ADMIN — TRAZODONE HYDROCHLORIDE 50 MG: 50 TABLET ORAL at 20:52

## 2024-11-15 RX ADMIN — PREGABALIN 200 MG: 100 CAPSULE ORAL at 20:45

## 2024-11-15 RX ADMIN — ACETAMINOPHEN 325MG 650 MG: 325 TABLET ORAL at 20:45

## 2024-11-15 RX ADMIN — METHOCARBAMOL TABLETS 500 MG: 500 TABLET, COATED ORAL at 14:59

## 2024-11-15 RX ADMIN — ASPIRIN 81 MG: 81 TABLET, COATED ORAL at 08:30

## 2024-11-15 RX ADMIN — LITHIUM CARBONATE 300 MG: 300 TABLET, EXTENDED RELEASE ORAL at 08:31

## 2024-11-15 ASSESSMENT — PAIN SCALES - GENERAL
PAINLEVEL_OUTOF10: 0 - NO PAIN
PAINLEVEL_OUTOF10: 8
PAINLEVEL_OUTOF10: 7
PAINLEVEL_OUTOF10: 8
PAINLEVEL_OUTOF10: 7

## 2024-11-15 ASSESSMENT — PAIN DESCRIPTION - LOCATION
LOCATION: BUTTOCKS
LOCATION: BUTTOCKS

## 2024-11-15 ASSESSMENT — COGNITIVE AND FUNCTIONAL STATUS - GENERAL
WALKING IN HOSPITAL ROOM: A LITTLE
STANDING UP FROM CHAIR USING ARMS: A LITTLE
TOILETING: A LITTLE
PERSONAL GROOMING: A LITTLE
MOVING TO AND FROM BED TO CHAIR: A LITTLE
DAILY ACTIVITIY SCORE: 19
TURNING FROM BACK TO SIDE WHILE IN FLAT BAD: A LITTLE
HELP NEEDED FOR BATHING: A LITTLE
DRESSING REGULAR LOWER BODY CLOTHING: A LITTLE
CLIMB 3 TO 5 STEPS WITH RAILING: A LOT
DRESSING REGULAR UPPER BODY CLOTHING: A LITTLE
MOBILITY SCORE: 18

## 2024-11-15 ASSESSMENT — PAIN - FUNCTIONAL ASSESSMENT
PAIN_FUNCTIONAL_ASSESSMENT: 0-10
PAIN_FUNCTIONAL_ASSESSMENT: 0-10

## 2024-11-15 NOTE — CARE PLAN
The patient's goals for the shift include      The clinical goals for the shift include Pt to remain safe    Over the shift, the patient did make progress toward the following goals.   Problem: Pain - Adult  Goal: Verbalizes/displays adequate comfort level or baseline comfort level  Outcome: Progressing     Problem: Safety - Adult  Goal: Free from fall injury  Outcome: Progressing     Problem: Discharge Planning  Goal: Discharge to home or other facility with appropriate resources  Outcome: Progressing     Problem: Chronic Conditions and Co-morbidities  Goal: Patient's chronic conditions and co-morbidity symptoms are monitored and maintained or improved  Outcome: Progressing     Problem: Fall/Injury  Goal: Use assistive devices by end of the shift  Outcome: Progressing     Problem: Skin  Goal: Participates in plan/prevention/treatment measures  Outcome: Progressing

## 2024-11-15 NOTE — CARE PLAN
Problem: Pain - Adult  Goal: Verbalizes/displays adequate comfort level or baseline comfort level  Outcome: Progressing     Problem: Safety - Adult  Goal: Free from fall injury  Outcome: Progressing     Problem: Discharge Planning  Goal: Discharge to home or other facility with appropriate resources  Outcome: Progressing     Problem: Chronic Conditions and Co-morbidities  Goal: Patient's chronic conditions and co-morbidity symptoms are monitored and maintained or improved  Outcome: Progressing   The patient's goals for the shift include      The clinical goals for the shift include Patient will remain free of all and discomforts until the end of the shift

## 2024-11-16 LAB
ANION GAP SERPL CALC-SCNC: 10 MMOL/L (ref 10–20)
BUN SERPL-MCNC: 13 MG/DL (ref 6–23)
CALCIUM SERPL-MCNC: 9.2 MG/DL (ref 8.6–10.3)
CHLORIDE SERPL-SCNC: 107 MMOL/L (ref 98–107)
CO2 SERPL-SCNC: 25 MMOL/L (ref 21–32)
CREAT SERPL-MCNC: 0.87 MG/DL (ref 0.5–1.3)
EGFRCR SERPLBLD CKD-EPI 2021: >90 ML/MIN/1.73M*2
GLUCOSE BLD MANUAL STRIP-MCNC: 118 MG/DL (ref 74–99)
GLUCOSE BLD MANUAL STRIP-MCNC: 186 MG/DL (ref 74–99)
GLUCOSE BLD MANUAL STRIP-MCNC: 190 MG/DL (ref 74–99)
GLUCOSE BLD MANUAL STRIP-MCNC: 74 MG/DL (ref 74–99)
GLUCOSE SERPL-MCNC: 75 MG/DL (ref 74–99)
HOLD SPECIMEN: NORMAL
HOLD SPECIMEN: NORMAL
POTASSIUM SERPL-SCNC: 3.8 MMOL/L (ref 3.5–5.3)
SODIUM SERPL-SCNC: 138 MMOL/L (ref 136–145)

## 2024-11-16 PROCEDURE — 80048 BASIC METABOLIC PNL TOTAL CA: CPT | Performed by: FAMILY MEDICINE

## 2024-11-16 PROCEDURE — 36415 COLL VENOUS BLD VENIPUNCTURE: CPT | Performed by: FAMILY MEDICINE

## 2024-11-16 PROCEDURE — 2500000002 HC RX 250 W HCPCS SELF ADMINISTERED DRUGS (ALT 637 FOR MEDICARE OP, ALT 636 FOR OP/ED): Performed by: NURSE PRACTITIONER

## 2024-11-16 PROCEDURE — G0378 HOSPITAL OBSERVATION PER HR: HCPCS

## 2024-11-16 PROCEDURE — 2500000001 HC RX 250 WO HCPCS SELF ADMINISTERED DRUGS (ALT 637 FOR MEDICARE OP): Performed by: FAMILY MEDICINE

## 2024-11-16 PROCEDURE — 1100000001 HC PRIVATE ROOM DAILY

## 2024-11-16 PROCEDURE — 2500000001 HC RX 250 WO HCPCS SELF ADMINISTERED DRUGS (ALT 637 FOR MEDICARE OP): Performed by: NURSE PRACTITIONER

## 2024-11-16 PROCEDURE — 82947 ASSAY GLUCOSE BLOOD QUANT: CPT

## 2024-11-16 PROCEDURE — 2500000002 HC RX 250 W HCPCS SELF ADMINISTERED DRUGS (ALT 637 FOR MEDICARE OP, ALT 636 FOR OP/ED): Performed by: FAMILY MEDICINE

## 2024-11-16 PROCEDURE — 2500000004 HC RX 250 GENERAL PHARMACY W/ HCPCS (ALT 636 FOR OP/ED): Performed by: NURSE PRACTITIONER

## 2024-11-16 RX ADMIN — ENOXAPARIN SODIUM 40 MG: 40 INJECTION SUBCUTANEOUS at 16:49

## 2024-11-16 RX ADMIN — INSULIN GLARGINE 44 UNITS: 100 INJECTION, SOLUTION SUBCUTANEOUS at 20:27

## 2024-11-16 RX ADMIN — ASPIRIN 81 MG: 81 TABLET, COATED ORAL at 09:51

## 2024-11-16 RX ADMIN — PANTOPRAZOLE SODIUM 20 MG: 40 TABLET, DELAYED RELEASE ORAL at 06:33

## 2024-11-16 RX ADMIN — Medication 1000 MCG: at 09:52

## 2024-11-16 RX ADMIN — METHOCARBAMOL TABLETS 500 MG: 500 TABLET, COATED ORAL at 22:56

## 2024-11-16 RX ADMIN — ATORVASTATIN CALCIUM 80 MG: 80 TABLET, FILM COATED ORAL at 09:51

## 2024-11-16 RX ADMIN — TRIHEXYPHENIDYL HYDROCHLORIDE 2 MG: 2 TABLET ORAL at 09:51

## 2024-11-16 RX ADMIN — LITHIUM CARBONATE 300 MG: 300 TABLET, EXTENDED RELEASE ORAL at 20:25

## 2024-11-16 RX ADMIN — METHOCARBAMOL TABLETS 500 MG: 500 TABLET, COATED ORAL at 14:23

## 2024-11-16 RX ADMIN — METHOCARBAMOL TABLETS 500 MG: 500 TABLET, COATED ORAL at 06:33

## 2024-11-16 RX ADMIN — Medication 1 TABLET: at 09:52

## 2024-11-16 RX ADMIN — TRAZODONE HYDROCHLORIDE 50 MG: 50 TABLET ORAL at 20:25

## 2024-11-16 RX ADMIN — Medication 1 TABLET: at 20:25

## 2024-11-16 RX ADMIN — POLYETHYLENE GLYCOL 3350 17 G: 17 POWDER, FOR SOLUTION ORAL at 09:52

## 2024-11-16 RX ADMIN — TAMSULOSIN HYDROCHLORIDE 0.4 MG: 0.4 CAPSULE ORAL at 09:51

## 2024-11-16 RX ADMIN — DULOXETINE HYDROCHLORIDE 60 MG: 30 CAPSULE, DELAYED RELEASE ORAL at 09:52

## 2024-11-16 RX ADMIN — PREGABALIN 200 MG: 100 CAPSULE ORAL at 20:24

## 2024-11-16 RX ADMIN — ISOSORBIDE MONONITRATE 60 MG: 30 TABLET, EXTENDED RELEASE ORAL at 09:51

## 2024-11-16 RX ADMIN — TRIHEXYPHENIDYL HYDROCHLORIDE 2 MG: 2 TABLET ORAL at 16:43

## 2024-11-16 RX ADMIN — PREGABALIN 200 MG: 100 CAPSULE ORAL at 09:51

## 2024-11-16 RX ADMIN — INSULIN LISPRO 6 UNITS: 100 INJECTION, SOLUTION INTRAVENOUS; SUBCUTANEOUS at 16:43

## 2024-11-16 RX ADMIN — LITHIUM CARBONATE 300 MG: 300 TABLET, EXTENDED RELEASE ORAL at 09:51

## 2024-11-16 RX ADMIN — HYDROCODONE BITARTRATE AND ACETAMINOPHEN 1 TABLET: 5; 325 TABLET ORAL at 09:56

## 2024-11-16 RX ADMIN — INSULIN LISPRO 6 UNITS: 100 INJECTION, SOLUTION INTRAVENOUS; SUBCUTANEOUS at 12:39

## 2024-11-16 RX ADMIN — QUETIAPINE FUMARATE 25 MG: 25 TABLET ORAL at 20:25

## 2024-11-16 ASSESSMENT — PAIN - FUNCTIONAL ASSESSMENT: PAIN_FUNCTIONAL_ASSESSMENT: 0-10

## 2024-11-16 ASSESSMENT — COGNITIVE AND FUNCTIONAL STATUS - GENERAL
WALKING IN HOSPITAL ROOM: A LITTLE
MOVING TO AND FROM BED TO CHAIR: A LITTLE
PERSONAL GROOMING: A LITTLE
MOBILITY SCORE: 18
CLIMB 3 TO 5 STEPS WITH RAILING: A LOT
TURNING FROM BACK TO SIDE WHILE IN FLAT BAD: A LITTLE
STANDING UP FROM CHAIR USING ARMS: A LITTLE
HELP NEEDED FOR BATHING: A LITTLE
DRESSING REGULAR UPPER BODY CLOTHING: A LITTLE
DAILY ACTIVITIY SCORE: 19
TOILETING: A LITTLE
DRESSING REGULAR LOWER BODY CLOTHING: A LITTLE

## 2024-11-16 ASSESSMENT — PAIN SCALES - WONG BAKER: WONGBAKER_NUMERICALRESPONSE: HURTS WHOLE LOT

## 2024-11-16 ASSESSMENT — PAIN SCALES - GENERAL
PAINLEVEL_OUTOF10: 9
PAINLEVEL_OUTOF10: 0 - NO PAIN

## 2024-11-16 NOTE — PROGRESS NOTES
26[birth year]2022 Brannon Engel is a 69 y.o. male       Pt resting in bed  Nad  No complaints  Pain in hip controlled    Review of Systems       Vitals:       BP: 121/77   Pulse: 68   Resp:    Temp: 36.2 °C (97.2 °F)   SpO2: 98%        Scheduled medications  aspirin, 81 mg, oral, Daily  atorvastatin, 80 mg, oral, Daily  calcium carbonate-vitamin D3, 1 tablet, oral, BID  cyanocobalamin, 1,000 mcg, oral, Daily  DULoxetine, 60 mg, oral, Daily  enoxaparin, 40 mg, subcutaneous, q24h  insulin glargine, 44 Units, subcutaneous, Nightly  insulin lispro, 6 Units, subcutaneous, TID  isosorbide mononitrate ER, 60 mg, oral, Daily  lidocaine, 1 patch, transdermal, Daily  lidocaine, 1 patch, transdermal, Daily  lithium ER, 300 mg, oral, BID  methocarbamol, 500 mg, oral, q8h VILMA  ondansetron ODT, 4 mg, oral, Once  pantoprazole, 20 mg, oral, Daily before breakfast  polyethylene glycol, 17 g, oral, Daily  pregabalin, 200 mg, oral, BID  QUEtiapine, 25 mg, oral, Nightly  tamsulosin, 0.4 mg, oral, Daily  traZODone, 50 mg, oral, Nightly  trihexyphenidyl, 2 mg, oral, BID      Continuous medications     PRN medications  PRN medications: acetaminophen **OR** acetaminophen **OR** acetaminophen, HYDROcodone-acetaminophen    Lab Review   Results from last 7 days   Lab Units 11/10/24  0507   WBC AUTO x10*3/uL 10.6   HEMOGLOBIN g/dL 14.1   HEMATOCRIT % 43.4   PLATELETS AUTO x10*3/uL 267     Results from last 7 days   Lab Units 11/10/24  0507   SODIUM mmol/L 137   POTASSIUM mmol/L 3.9   CHLORIDE mmol/L 109*   CO2 mmol/L 21   BUN mg/dL 10   CREATININE mg/dL 0.97   CALCIUM mg/dL 9.0   GLUCOSE mg/dL 93            CT pelvis wo IV contrast   Final Result   No acute fracture or suspicious osseous lesions.   Mild osteoarthritis of the right hip.   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        Signed by: Cali Martinez 11/8/2024 3:59 PM   Dictation workstation:   [id number]      XR hip right with pelvis when performed 2 or 3  views   Final Result   Intact total left hip arthroplasty. Degenerative changes of the right   hip joint. No acute fracture or subluxation.        Signed by: Brendan Taylor 11/7/2024 11:42 AM   Dictation workstation:   [id number]      CT head wo IV contrast   Final Result   No evidence of acute cortical infarct or intracranial hemorrhage.        Chronic changes as described.        MACRO:   None        Signed by: Dagoberto Trejo 11/7/2024 11:14 AM   Dictation workstation:   [id number]      CT cervical spine wo IV contrast   Final Result   No evidence for an acute fracture or subluxation of the cervical   spine.        MACRO:   None        Signed by: Dagoberto Treoj 11/7/2024 11:34 AM   Dictation workstation:   [id number]            Physical Exam    Constitutional   General appearance: Alert and in no acute distress.   Pulmonary   Respiratory assessment: No respiratory distress, normal respiratory rhythm and effort.    Auscultation of Lungs: Clear bilateral breath sounds.   Cardiovascular   Auscultation of heart: Apical pulse normal, heart rate and rhythm normal, normal S1 and S2, no murmurs and no pericardial rub.    Exam for edema: No peripheral edema.   Abdomen   Abdominal Exam: No bruits, normal bowel sounds, soft, non-tender, no abdominal mass palpated.    Liver and Spleen exam: No hepato-splenomegaly.   Musculoskeletal   Moves all extremities  Neurologic   Cranial nerves: Nerves 2-12 were intact, no focal neuro defects.      pt has jc prior to admission      Assessment/Plan      #Urinary tract infection  Stopped rocephin    #Hyponatremia  Has resolved    #Chronic hip pain  From chronic osteoarthritis  Lidocaine patch/pain control  PT OT consulted    #Schizophrenia  Continue home medications    #Diabetes mellitus  Sliding scale insulin coverage    #Hypertension  Stable continue home medications    PTOT    Pt stable for dc when arranged to snf  No antibiotics   Cont with jc     Plan of care discussed  with: Provider, RN, Patient  Pt cleared for dc  Waiting for facility   Check bmp in am  If Na levels ok will change fuid restriction      Shiv De La Rosa MD    .

## 2024-11-16 NOTE — PROGRESS NOTES
Brannon Engel is a 69 y.o. male       Pt resting in bed  Nad  No complaints  Pain in hip controlled    Review of Systems       Vitals:       BP: 121/77   Pulse: 68   Resp:    Temp: 36.2 °C (97.2 °F)   SpO2: 98%        Scheduled medications  aspirin, 81 mg, oral, Daily  atorvastatin, 80 mg, oral, Daily  calcium carbonate-vitamin D3, 1 tablet, oral, BID  cyanocobalamin, 1,000 mcg, oral, Daily  DULoxetine, 60 mg, oral, Daily  enoxaparin, 40 mg, subcutaneous, q24h  insulin glargine, 44 Units, subcutaneous, Nightly  insulin lispro, 6 Units, subcutaneous, TID  isosorbide mononitrate ER, 60 mg, oral, Daily  lidocaine, 1 patch, transdermal, Daily  lidocaine, 1 patch, transdermal, Daily  lithium ER, 300 mg, oral, BID  methocarbamol, 500 mg, oral, q8h VILMA  ondansetron ODT, 4 mg, oral, Once  pantoprazole, 20 mg, oral, Daily before breakfast  polyethylene glycol, 17 g, oral, Daily  pregabalin, 200 mg, oral, BID  QUEtiapine, 25 mg, oral, Nightly  tamsulosin, 0.4 mg, oral, Daily  traZODone, 50 mg, oral, Nightly  trihexyphenidyl, 2 mg, oral, BID      Continuous medications     PRN medications  PRN medications: acetaminophen **OR** acetaminophen **OR** acetaminophen, HYDROcodone-acetaminophen    Lab Review   Results from last 7 days   Lab Units 11/10/24  0507   WBC AUTO x10*3/uL 10.6   HEMOGLOBIN g/dL 14.1   HEMATOCRIT % 43.4   PLATELETS AUTO x10*3/uL 267     Results from last 7 days   Lab Units 11/16/24  0531 11/10/24  0507   SODIUM mmol/L 138 137   POTASSIUM mmol/L 3.8 3.9   CHLORIDE mmol/L 107 109*   CO2 mmol/L 25 21   BUN mg/dL 13 10   CREATININE mg/dL 0.87 0.97   CALCIUM mg/dL 9.2 9.0   GLUCOSE mg/dL 75 93            CT pelvis wo IV contrast   Final Result   No acute fracture or suspicious osseous lesions.   Mild osteoarthritis of the right hip.   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        Signed by: Cali Martinez 11/8/2024 3:59 PM   Dictation workstation:   [id number]      XR hip  right with pelvis when performed 2 or 3 views   Final Result   Intact total left hip arthroplasty. Degenerative changes of the right   hip joint. No acute fracture or subluxation.        Signed by: Brendan Taylor 11/7/2024 11:42 AM   Dictation workstation:   [id number]      CT head wo IV contrast   Final Result   No evidence of acute cortical infarct or intracranial hemorrhage.        Chronic changes as described.        MACRO:   None        Signed by: Dagoberto Kendallvalerie 11/7/2024 11:14 AM   Dictation workstation:   [id number]      CT cervical spine wo IV contrast   Final Result   No evidence for an acute fracture or subluxation of the cervical   spine.        MACRO:   None        Signed by: Dagoberto Trejo 11/7/2024 11:34 AM   Dictation workstation:   [id number]            Physical Exam    Constitutional   General appearance: Alert and in no acute distress.   Pulmonary   Respiratory assessment: No respiratory distress, normal respiratory rhythm and effort.    Auscultation of Lungs: Clear bilateral breath sounds.   Cardiovascular   Auscultation of heart: Apical pulse normal, heart rate and rhythm normal, normal S1 and S2, no murmurs and no pericardial rub.    Exam for edema: No peripheral edema.   Abdomen   Abdominal Exam: No bruits, normal bowel sounds, soft, non-tender, no abdominal mass palpated.    Liver and Spleen exam: No hepato-splenomegaly.   Musculoskeletal   Moves all extremities  Neurologic   Cranial nerves: Nerves 2-12 were intact, no focal neuro defects.      pt has jc prior to admission      Assessment/Plan      #Urinary tract infection  Stopped rocephin    #Hyponatremia  Has resolved    #Chronic hip pain  From chronic osteoarthritis  Lidocaine patch/pain control  PT OT consulted    #Schizophrenia  Continue home medications    #Diabetes mellitus  Sliding scale insulin coverage    #Hypertension  Stable continue home medications    PTOT    Pt stable for dc when arranged to snf  No antibiotics   Cont  with jc     Plan of care discussed with: Provider, RN, Patient  Pt cleared for dc  Waiting for facility   Sodium is 138      Shiv De La Rosa MD    .

## 2024-11-16 NOTE — CARE PLAN
The patient's goals for the shift include      The clinical goals for the shift include Pt to remain safe    Problem: Pain - Adult  Goal: Verbalizes/displays adequate comfort level or baseline comfort level  Outcome: Progressing     Problem: Safety - Adult  Goal: Free from fall injury  Outcome: Progressing

## 2024-11-17 LAB
GLUCOSE BLD MANUAL STRIP-MCNC: 109 MG/DL (ref 74–99)
GLUCOSE BLD MANUAL STRIP-MCNC: 161 MG/DL (ref 74–99)
GLUCOSE BLD MANUAL STRIP-MCNC: 203 MG/DL (ref 74–99)
GLUCOSE BLD MANUAL STRIP-MCNC: 78 MG/DL (ref 74–99)

## 2024-11-17 PROCEDURE — 2500000001 HC RX 250 WO HCPCS SELF ADMINISTERED DRUGS (ALT 637 FOR MEDICARE OP): Performed by: NURSE PRACTITIONER

## 2024-11-17 PROCEDURE — 2500000004 HC RX 250 GENERAL PHARMACY W/ HCPCS (ALT 636 FOR OP/ED): Performed by: NURSE PRACTITIONER

## 2024-11-17 PROCEDURE — 2500000002 HC RX 250 W HCPCS SELF ADMINISTERED DRUGS (ALT 637 FOR MEDICARE OP, ALT 636 FOR OP/ED): Performed by: FAMILY MEDICINE

## 2024-11-17 PROCEDURE — 1100000001 HC PRIVATE ROOM DAILY

## 2024-11-17 PROCEDURE — 82947 ASSAY GLUCOSE BLOOD QUANT: CPT

## 2024-11-17 PROCEDURE — 2500000002 HC RX 250 W HCPCS SELF ADMINISTERED DRUGS (ALT 637 FOR MEDICARE OP, ALT 636 FOR OP/ED): Performed by: NURSE PRACTITIONER

## 2024-11-17 PROCEDURE — 97112 NEUROMUSCULAR REEDUCATION: CPT | Mod: GP

## 2024-11-17 PROCEDURE — 97116 GAIT TRAINING THERAPY: CPT | Mod: GP

## 2024-11-17 PROCEDURE — 2500000001 HC RX 250 WO HCPCS SELF ADMINISTERED DRUGS (ALT 637 FOR MEDICARE OP): Performed by: FAMILY MEDICINE

## 2024-11-17 PROCEDURE — G0378 HOSPITAL OBSERVATION PER HR: HCPCS

## 2024-11-17 RX ADMIN — LITHIUM CARBONATE 300 MG: 300 TABLET, EXTENDED RELEASE ORAL at 20:36

## 2024-11-17 RX ADMIN — TAMSULOSIN HYDROCHLORIDE 0.4 MG: 0.4 CAPSULE ORAL at 09:11

## 2024-11-17 RX ADMIN — TRIHEXYPHENIDYL HYDROCHLORIDE 2 MG: 2 TABLET ORAL at 09:10

## 2024-11-17 RX ADMIN — ATORVASTATIN CALCIUM 80 MG: 80 TABLET, FILM COATED ORAL at 09:11

## 2024-11-17 RX ADMIN — PREGABALIN 200 MG: 100 CAPSULE ORAL at 09:11

## 2024-11-17 RX ADMIN — TRAZODONE HYDROCHLORIDE 50 MG: 50 TABLET ORAL at 20:36

## 2024-11-17 RX ADMIN — ENOXAPARIN SODIUM 40 MG: 40 INJECTION SUBCUTANEOUS at 17:45

## 2024-11-17 RX ADMIN — TRIHEXYPHENIDYL HYDROCHLORIDE 2 MG: 2 TABLET ORAL at 17:45

## 2024-11-17 RX ADMIN — INSULIN LISPRO 6 UNITS: 100 INJECTION, SOLUTION INTRAVENOUS; SUBCUTANEOUS at 17:45

## 2024-11-17 RX ADMIN — QUETIAPINE FUMARATE 25 MG: 25 TABLET ORAL at 20:36

## 2024-11-17 RX ADMIN — INSULIN LISPRO 6 UNITS: 100 INJECTION, SOLUTION INTRAVENOUS; SUBCUTANEOUS at 12:13

## 2024-11-17 RX ADMIN — METHOCARBAMOL TABLETS 500 MG: 500 TABLET, COATED ORAL at 06:10

## 2024-11-17 RX ADMIN — ASPIRIN 81 MG: 81 TABLET, COATED ORAL at 09:11

## 2024-11-17 RX ADMIN — INSULIN GLARGINE 44 UNITS: 100 INJECTION, SOLUTION SUBCUTANEOUS at 20:36

## 2024-11-17 RX ADMIN — LITHIUM CARBONATE 300 MG: 300 TABLET, EXTENDED RELEASE ORAL at 09:11

## 2024-11-17 RX ADMIN — METHOCARBAMOL TABLETS 500 MG: 500 TABLET, COATED ORAL at 13:34

## 2024-11-17 RX ADMIN — Medication 1000 MCG: at 09:11

## 2024-11-17 RX ADMIN — ISOSORBIDE MONONITRATE 60 MG: 30 TABLET, EXTENDED RELEASE ORAL at 09:11

## 2024-11-17 RX ADMIN — PANTOPRAZOLE SODIUM 20 MG: 40 TABLET, DELAYED RELEASE ORAL at 06:10

## 2024-11-17 RX ADMIN — Medication 1 TABLET: at 09:11

## 2024-11-17 RX ADMIN — METHOCARBAMOL TABLETS 500 MG: 500 TABLET, COATED ORAL at 20:36

## 2024-11-17 RX ADMIN — POLYETHYLENE GLYCOL 3350 17 G: 17 POWDER, FOR SOLUTION ORAL at 09:11

## 2024-11-17 RX ADMIN — DULOXETINE HYDROCHLORIDE 60 MG: 30 CAPSULE, DELAYED RELEASE ORAL at 09:11

## 2024-11-17 RX ADMIN — Medication 1 TABLET: at 20:36

## 2024-11-17 RX ADMIN — PREGABALIN 200 MG: 100 CAPSULE ORAL at 20:36

## 2024-11-17 ASSESSMENT — COGNITIVE AND FUNCTIONAL STATUS - GENERAL
PERSONAL GROOMING: A LITTLE
TOILETING: A LITTLE
CLIMB 3 TO 5 STEPS WITH RAILING: TOTAL
DRESSING REGULAR UPPER BODY CLOTHING: A LITTLE
MOBILITY SCORE: 18
STANDING UP FROM CHAIR USING ARMS: A LITTLE
DRESSING REGULAR LOWER BODY CLOTHING: A LITTLE
MOVING TO AND FROM BED TO CHAIR: A LITTLE
HELP NEEDED FOR BATHING: A LITTLE
MOBILITY SCORE: 15
MOVING FROM LYING ON BACK TO SITTING ON SIDE OF FLAT BED WITH BEDRAILS: A LITTLE
MOVING TO AND FROM BED TO CHAIR: A LITTLE
TOILETING: A LITTLE
WALKING IN HOSPITAL ROOM: A LITTLE
HELP NEEDED FOR BATHING: A LITTLE
MOVING TO AND FROM BED TO CHAIR: A LITTLE
DRESSING REGULAR UPPER BODY CLOTHING: A LITTLE
TURNING FROM BACK TO SIDE WHILE IN FLAT BAD: A LITTLE
TURNING FROM BACK TO SIDE WHILE IN FLAT BAD: A LITTLE
HELP NEEDED FOR BATHING: A LITTLE
DAILY ACTIVITIY SCORE: 19
DAILY ACTIVITIY SCORE: 20
STANDING UP FROM CHAIR USING ARMS: A LITTLE
MOBILITY SCORE: 19
CLIMB 3 TO 5 STEPS WITH RAILING: A LOT
STANDING UP FROM CHAIR USING ARMS: A LOT
TOILETING: A LITTLE
DRESSING REGULAR LOWER BODY CLOTHING: A LITTLE
WALKING IN HOSPITAL ROOM: A LITTLE
PERSONAL GROOMING: A LITTLE
MOVING TO AND FROM BED TO CHAIR: A LITTLE
DRESSING REGULAR UPPER BODY CLOTHING: A LITTLE
WALKING IN HOSPITAL ROOM: A LITTLE
DRESSING REGULAR LOWER BODY CLOTHING: A LITTLE
DAILY ACTIVITIY SCORE: 19
WALKING IN HOSPITAL ROOM: A LITTLE
STANDING UP FROM CHAIR USING ARMS: A LITTLE
CLIMB 3 TO 5 STEPS WITH RAILING: A LOT
TURNING FROM BACK TO SIDE WHILE IN FLAT BAD: A LITTLE
MOBILITY SCORE: 18
CLIMB 3 TO 5 STEPS WITH RAILING: A LOT

## 2024-11-17 ASSESSMENT — PAIN SCALES - GENERAL
PAINLEVEL_OUTOF10: 0 - NO PAIN

## 2024-11-17 ASSESSMENT — PAIN SCALES - WONG BAKER: WONGBAKER_NUMERICALRESPONSE: NO HURT

## 2024-11-17 ASSESSMENT — PAIN - FUNCTIONAL ASSESSMENT
PAIN_FUNCTIONAL_ASSESSMENT: 0-10
PAIN_FUNCTIONAL_ASSESSMENT: 0-10

## 2024-11-17 NOTE — CARE PLAN
The patient's goals for the shift include      The clinical goals for the shift include pt to remain free from falls      Problem: Pain - Adult  Goal: Verbalizes/displays adequate comfort level or baseline comfort level  Outcome: Progressing     Problem: Safety - Adult  Goal: Free from fall injury  Outcome: Progressing     Problem: Fall/Injury  Goal: Not fall by end of shift  Outcome: Progressing  Goal: Be free from injury by end of the shift  Outcome: Progressing  Goal: Verbalize understanding of personal risk factors for fall in the hospital  Outcome: Progressing  Goal: Verbalize understanding of risk factor reduction measures to prevent injury from fall in the home  Outcome: Progressing  Goal: Use assistive devices by end of the shift  Outcome: Progressing  Goal: Pace activities to prevent fatigue by end of the shift  Outcome: Progressing      home

## 2024-11-17 NOTE — PROGRESS NOTES
Physical Therapy    Physical Therapy Treatment    Patient Name: Brannon Engel  MRN: [id number]  Department: [place] [id number]  Room: [address] [place] [address]  Today's Date: 11/17/2024  Time Calculation  Start Time: 1254  Stop Time: 1317  Time Calculation (min): 23 min         Assessment/Plan   PT Assessment  PT Assessment Results: Decreased strength, Decreased range of motion, Decreased endurance, Impaired balance, Decreased mobility, Pain, Impaired judgement, Decreased safety awareness  Rehab Prognosis: Good  Barriers to Discharge: Needing increased focus on endurance and upright tolerance  Evaluation/Treatment Tolerance: Patient limited by fatigue, Treatment limited secondary to agitation  Medical Staff Made Aware: Yes  End of Session Communication: Bedside nurse  Assessment Comment: PT tx complete. Pt needing increased encouragement for participate, appears to be participating only with max encouragement. Needing less hands on assist, but also does not seem to understand need for continued work of body for strengthening from current deconditioning.  End of Session Patient Position: Up in chair, Alarm on  PT Plan  Inpatient/Swing Bed or Outpatient: Inpatient  PT Plan  Treatment/Interventions: Bed mobility, Transfer training, Gait training, Therapeutic activity, Therapeutic exercise  PT Plan: Ongoing PT  PT Frequency: 3 times per week  PT Discharge Recommendations: Moderate intensity level of continued care  Equipment Recommended upon Discharge:  (TBD)  PT Recommended Transfer Status: Assist x1  PT - OK to Discharge: Yes (Per PT POC)      General Visit Information:   PT  Visit  PT Received On: 11/17/24  Response to Previous Treatment: Patient reporting fatigue but able to participate.  General  Family/Caregiver Present: No  Prior to Session Communication: Bedside nurse  Patient Position Received: Alarm on, Bed, 3 rail up  Preferred Learning Style: auditory, kinesthetic, verbal  General Comment: Pt supine in bed, finishing lunch, willing to  participate as requested by PT, but limited in interest, minimal tolerance to participation.    Subjective   Precautions:  Precautions  Medical Precautions: Fall precautions            Objective   Pain:  Pain Assessment  Pain Assessment: 0-10  0-10 (Numeric) Pain Score: 0 - No pain  Patient's Stated Pain Goal: No pain  Cognition:  Cognition  Overall Cognitive Status: Impaired (Mild confusion)  Orientation Level: Oriented X4  Safety/Judgement: Exceptions to WFL  Insight: Mild  Impulsive: Mildly  Processing Speed: Delayed  Coordination:  Movements are Fluid and Coordinated: Yes  Postural Control:  Postural Control  Postural Control: Within Functional Limits    Activity Tolerance:  Activity Tolerance  Endurance: Decreased tolerance for upright activites  Treatments:  Therapeutic Activity  Therapeutic Activity Performed: Yes  Therapeutic Activity 1: Educated on need for continued participation for progression, Increased focus on upright posture out of bed. Pt reporting wanting to get back to bed immediately after getting up to chair. Was encouraged to stay up.    Balance/Neuromuscular Re-Education  Balance/Neuromuscular Re-Education Activity Performed: Yes  Balance/Neuromuscular Re-Education Activity 1: Increased focus on upright trunk stabilization in both sitting and standing. >/= 10 minutes of EOB and standing mobility of trunk with focus on attempting to encourage more trunk activation and self support.    Bed Mobility  Bed Mobility: Yes  Bed Mobility 1  Bed Mobility 1: Supine to sitting  Level of Assistance 1: Moderate verbal cues, Contact guard  Bed Mobility Comments 1: Significantly increased time and effort to complete.    Ambulation/Gait Training  Ambulation/Gait Training Performed: Yes  Ambulation/Gait Training 1  Surface 1: Level tile  Device 1: Rolling walker  Assistance 1: Moderate verbal cues, Moderate tactile cues, Minimum assistance  Quality of Gait 1: Narrow base of support, Inconsistent stride length,  Decreased step length, Shuffling gait (Slowed franck, decreased stride length, CGA for ensuring safety. Needing encouragement for increased distance.)  Comments/Distance (ft) 1: 25'  Transfers  Transfer: Yes  Transfer 1  Transfer From 1: Sit to, Stand to  Technique 1: Sit to stand, Stand to sit  Transfer Device 1: Walker  Transfer Level of Assistance 1: Moderate verbal cues, Minimum assistance  Trials/Comments 1: Cues for hand placement and scooting to the EOB. Increased time to complete.    Stairs  Stairs: No    Outcome Measures:  [facility] Basic Mobility  Turning from your back to your side while in a flat bed without using bedrails: A little  Moving from lying on your back to sitting on the side of a flat bed without using bedrails: A little  Moving to and from bed to chair (including a wheelchair): A little  Standing up from a chair using your arms (e.g. wheelchair or bedside chair): A lot  To walk in hospital room: A little  Climbing 3-5 steps with railing: Total  Basic Mobility - Total Score: 15    Education Documentation  Body Mechanics, taught by Alo Yadav PT at 11/17/2024  5:14 PM.  Learner: Patient  Readiness: Nonacceptance  Method: Explanation, Demonstration  Response: Needs Reinforcement    Mobility Training, taught by Alo Yadav PT at 11/17/2024  5:14 PM.  Learner: Patient  Readiness: Nonacceptance  Method: Explanation, Demonstration  Response: Needs Reinforcement    Education Comments  No comments found.        OP EDUCATION:       Encounter Problems       Encounter Problems (Active)       Balance       Pt will tolerate 10+ mins dynamic standing balance activities with CGA or less and no LOB.  (Progressing)       Start:  11/08/24    Expected End:  11/22/24               Mobility       STG - Patient will ambulate 50 ft with CGA and a RW.  (Progressing)       Start:  11/08/24    Expected End:  11/22/24            Pt will tolerate 15 reps of each LE exercise in order to improve strength  and endurance.   (Progressing)       Start:  11/08/24    Expected End:  11/22/24               PT Transfers       STG - Patient will perform bed mobility independently. (Progressing)       Start:  11/08/24    Expected End:  11/22/24            STG - Patient will transfer sit to and from stand with CGA and a RW.  (Progressing)       Start:  11/08/24    Expected End:  11/22/24               Pain - Adult

## 2024-11-17 NOTE — CARE PLAN
The patient's goals for the shift include      The clinical goals for the shift include pt to remain free from falls      Problem: Pain - Adult  Goal: Verbalizes/displays adequate comfort level or baseline comfort level  Outcome: Progressing     Problem: Safety - Adult  Goal: Free from fall injury  Outcome: Progressing     Problem: Discharge Planning  Goal: Discharge to home or other facility with appropriate resources  Outcome: Progressing     Problem: Chronic Conditions and Co-morbidities  Goal: Patient's chronic conditions and co-morbidity symptoms are monitored and maintained or improved  Outcome: Progressing     Problem: Fall/Injury  Goal: Not fall by end of shift  Outcome: Progressing  Goal: Be free from injury by end of the shift  Outcome: Progressing  Goal: Verbalize understanding of personal risk factors for fall in the hospital  Outcome: Progressing  Goal: Verbalize understanding of risk factor reduction measures to prevent injury from fall in the home  Outcome: Progressing  Goal: Use assistive devices by end of the shift  Outcome: Progressing  Goal: Pace activities to prevent fatigue by end of the shift  Outcome: Progressing     Problem: Pain  Goal: Takes deep breaths with improved pain control throughout the shift  Outcome: Progressing  Goal: Turns in bed with improved pain control throughout the shift  Outcome: Progressing  Goal: Walks with improved pain control throughout the shift  Outcome: Progressing  Goal: Performs ADL's with improved pain control throughout shift  Outcome: Progressing  Goal: Participates in PT with improved pain control throughout the shift  Outcome: Progressing  Goal: Free from opioid side effects throughout the shift  Outcome: Progressing  Goal: Free from acute confusion related to pain meds throughout the shift  Outcome: Progressing     Problem: Skin  Goal: Decreased wound size/increased tissue granulation at next dressing change  Outcome: Progressing  Flowsheets (Taken  11/17/2024 0302)  Decreased wound size/increased tissue granulation at next dressing change: Promote sleep for wound healing  Goal: Participates in plan/prevention/treatment measures  Outcome: Progressing  Flowsheets (Taken 11/17/2024 0302)  Participates in plan/prevention/treatment measures:   Elevate heels   Increase activity/out of bed for meals  Goal: Prevent/manage excess moisture  Outcome: Progressing  Flowsheets (Taken 11/17/2024 0302)  Prevent/manage excess moisture: Follow provider orders for dressing changes  Goal: Prevent/minimize sheer/friction injuries  Outcome: Progressing  Flowsheets (Taken 11/17/2024 0302)  Prevent/minimize sheer/friction injuries:   Increase activity/out of bed for meals   HOB 30 degrees or less  Goal: Promote/optimize nutrition  Outcome: Progressing  Flowsheets (Taken 11/17/2024 0302)  Promote/optimize nutrition: Offer water/supplements/favorite foods  Goal: Promote skin healing  Outcome: Progressing  Flowsheets (Taken 11/17/2024 0302)  Promote skin healing: Protective dressings over bony prominences

## 2024-11-17 NOTE — PROGRESS NOTES
11/17/24 0842   Discharge Planning   Home or Post Acute Services Post acute facilities (Rehab/SNF/etc)   Type of Post Acute Facility Services Skilled nursing   Expected Discharge Disposition SNF  ([facility], waiting on auth)     Patient is medically ready for discharge.  Waiting on auth for SNF.  Kathia Vicente RN

## 2024-11-18 VITALS
RESPIRATION RATE: 18 BRPM | HEART RATE: 77 BPM | OXYGEN SATURATION: 94 % | TEMPERATURE: 97.9 F | WEIGHT: 183.3 LBS | SYSTOLIC BLOOD PRESSURE: 110 MMHG | BODY MASS INDEX: 27.15 KG/M2 | HEIGHT: 69 IN | DIASTOLIC BLOOD PRESSURE: 71 MMHG

## 2024-11-18 LAB
GLUCOSE BLD MANUAL STRIP-MCNC: 116 MG/DL (ref 74–99)
GLUCOSE BLD MANUAL STRIP-MCNC: 119 MG/DL (ref 74–99)
GLUCOSE BLD MANUAL STRIP-MCNC: 149 MG/DL (ref 74–99)
GLUCOSE BLD MANUAL STRIP-MCNC: 96 MG/DL (ref 74–99)

## 2024-11-18 PROCEDURE — G0378 HOSPITAL OBSERVATION PER HR: HCPCS

## 2024-11-18 PROCEDURE — 2500000001 HC RX 250 WO HCPCS SELF ADMINISTERED DRUGS (ALT 637 FOR MEDICARE OP): Performed by: FAMILY MEDICINE

## 2024-11-18 PROCEDURE — 2500000001 HC RX 250 WO HCPCS SELF ADMINISTERED DRUGS (ALT 637 FOR MEDICARE OP): Performed by: NURSE PRACTITIONER

## 2024-11-18 PROCEDURE — 2500000002 HC RX 250 W HCPCS SELF ADMINISTERED DRUGS (ALT 637 FOR MEDICARE OP, ALT 636 FOR OP/ED): Performed by: FAMILY MEDICINE

## 2024-11-18 PROCEDURE — 1100000001 HC PRIVATE ROOM DAILY

## 2024-11-18 PROCEDURE — 97535 SELF CARE MNGMENT TRAINING: CPT | Mod: GO | Performed by: OCCUPATIONAL THERAPIST

## 2024-11-18 PROCEDURE — 97530 THERAPEUTIC ACTIVITIES: CPT | Mod: GO | Performed by: OCCUPATIONAL THERAPIST

## 2024-11-18 PROCEDURE — 82947 ASSAY GLUCOSE BLOOD QUANT: CPT

## 2024-11-18 PROCEDURE — 2500000002 HC RX 250 W HCPCS SELF ADMINISTERED DRUGS (ALT 637 FOR MEDICARE OP, ALT 636 FOR OP/ED): Performed by: NURSE PRACTITIONER

## 2024-11-18 PROCEDURE — 2500000004 HC RX 250 GENERAL PHARMACY W/ HCPCS (ALT 636 FOR OP/ED): Performed by: NURSE PRACTITIONER

## 2024-11-18 RX ADMIN — METHOCARBAMOL TABLETS 500 MG: 500 TABLET, COATED ORAL at 15:13

## 2024-11-18 RX ADMIN — POLYETHYLENE GLYCOL 3350 17 G: 17 POWDER, FOR SOLUTION ORAL at 08:25

## 2024-11-18 RX ADMIN — QUETIAPINE FUMARATE 25 MG: 25 TABLET ORAL at 20:44

## 2024-11-18 RX ADMIN — INSULIN LISPRO 6 UNITS: 100 INJECTION, SOLUTION INTRAVENOUS; SUBCUTANEOUS at 12:11

## 2024-11-18 RX ADMIN — METHOCARBAMOL TABLETS 500 MG: 500 TABLET, COATED ORAL at 20:44

## 2024-11-18 RX ADMIN — PANTOPRAZOLE SODIUM 20 MG: 40 TABLET, DELAYED RELEASE ORAL at 05:34

## 2024-11-18 RX ADMIN — TRIHEXYPHENIDYL HYDROCHLORIDE 2 MG: 2 TABLET ORAL at 16:54

## 2024-11-18 RX ADMIN — ISOSORBIDE MONONITRATE 60 MG: 30 TABLET, EXTENDED RELEASE ORAL at 08:25

## 2024-11-18 RX ADMIN — PREGABALIN 200 MG: 100 CAPSULE ORAL at 08:25

## 2024-11-18 RX ADMIN — Medication 1 TABLET: at 08:25

## 2024-11-18 RX ADMIN — Medication 1 TABLET: at 20:44

## 2024-11-18 RX ADMIN — ENOXAPARIN SODIUM 40 MG: 40 INJECTION SUBCUTANEOUS at 16:54

## 2024-11-18 RX ADMIN — Medication 1000 MCG: at 08:25

## 2024-11-18 RX ADMIN — INSULIN GLARGINE 44 UNITS: 100 INJECTION, SOLUTION SUBCUTANEOUS at 20:44

## 2024-11-18 RX ADMIN — TRIHEXYPHENIDYL HYDROCHLORIDE 2 MG: 2 TABLET ORAL at 08:25

## 2024-11-18 RX ADMIN — PREGABALIN 200 MG: 100 CAPSULE ORAL at 20:44

## 2024-11-18 RX ADMIN — HYDROCODONE BITARTRATE AND ACETAMINOPHEN 1 TABLET: 5; 325 TABLET ORAL at 12:11

## 2024-11-18 RX ADMIN — LITHIUM CARBONATE 300 MG: 300 TABLET, EXTENDED RELEASE ORAL at 08:25

## 2024-11-18 RX ADMIN — DULOXETINE HYDROCHLORIDE 60 MG: 30 CAPSULE, DELAYED RELEASE ORAL at 08:25

## 2024-11-18 RX ADMIN — INSULIN LISPRO 6 UNITS: 100 INJECTION, SOLUTION INTRAVENOUS; SUBCUTANEOUS at 08:25

## 2024-11-18 RX ADMIN — ASPIRIN 81 MG: 81 TABLET, COATED ORAL at 08:25

## 2024-11-18 RX ADMIN — METHOCARBAMOL TABLETS 500 MG: 500 TABLET, COATED ORAL at 05:34

## 2024-11-18 RX ADMIN — TAMSULOSIN HYDROCHLORIDE 0.4 MG: 0.4 CAPSULE ORAL at 08:25

## 2024-11-18 RX ADMIN — INSULIN LISPRO 6 UNITS: 100 INJECTION, SOLUTION INTRAVENOUS; SUBCUTANEOUS at 16:54

## 2024-11-18 RX ADMIN — ATORVASTATIN CALCIUM 80 MG: 80 TABLET, FILM COATED ORAL at 08:25

## 2024-11-18 RX ADMIN — TRAZODONE HYDROCHLORIDE 50 MG: 50 TABLET ORAL at 20:44

## 2024-11-18 RX ADMIN — LITHIUM CARBONATE 300 MG: 300 TABLET, EXTENDED RELEASE ORAL at 20:44

## 2024-11-18 ASSESSMENT — COGNITIVE AND FUNCTIONAL STATUS - GENERAL
MOBILITY SCORE: 19
DAILY ACTIVITIY SCORE: 20
CLIMB 3 TO 5 STEPS WITH RAILING: A LOT
EATING MEALS: A LITTLE
STANDING UP FROM CHAIR USING ARMS: A LITTLE
HELP NEEDED FOR BATHING: A LITTLE
TOILETING: A LITTLE
STANDING UP FROM CHAIR USING ARMS: A LITTLE
MOBILITY SCORE: 19
DRESSING REGULAR UPPER BODY CLOTHING: A LITTLE
PERSONAL GROOMING: A LITTLE
DRESSING REGULAR UPPER BODY CLOTHING: A LITTLE
MOVING TO AND FROM BED TO CHAIR: A LITTLE
DAILY ACTIVITIY SCORE: 18
DRESSING REGULAR UPPER BODY CLOTHING: A LITTLE
WALKING IN HOSPITAL ROOM: A LITTLE
TOILETING: A LITTLE
DRESSING REGULAR LOWER BODY CLOTHING: A LITTLE
HELP NEEDED FOR BATHING: A LITTLE
HELP NEEDED FOR BATHING: A LITTLE
DRESSING REGULAR LOWER BODY CLOTHING: A LITTLE
DRESSING REGULAR LOWER BODY CLOTHING: A LITTLE
WALKING IN HOSPITAL ROOM: A LITTLE
MOVING TO AND FROM BED TO CHAIR: A LITTLE
TOILETING: A LITTLE
DAILY ACTIVITIY SCORE: 20
CLIMB 3 TO 5 STEPS WITH RAILING: A LOT

## 2024-11-18 ASSESSMENT — PAIN SCALES - GENERAL
PAINLEVEL_OUTOF10: 0 - NO PAIN
PAINLEVEL_OUTOF10: 0 - NO PAIN
PAINLEVEL_OUTOF10: 9
PAINLEVEL_OUTOF10: 0 - NO PAIN
PAINLEVEL_OUTOF10: 9

## 2024-11-18 ASSESSMENT — PAIN - FUNCTIONAL ASSESSMENT
PAIN_FUNCTIONAL_ASSESSMENT: 0-10

## 2024-11-18 ASSESSMENT — PAIN SCALES - PAIN ASSESSMENT IN ADVANCED DEMENTIA (PAINAD)
BODYLANGUAGE: RELAXED
BREATHING: NORMAL
CONSOLABILITY: NO NEED TO CONSOLE

## 2024-11-18 ASSESSMENT — PAIN DESCRIPTION - LOCATION: LOCATION: HIP

## 2024-11-18 ASSESSMENT — ACTIVITIES OF DAILY LIVING (ADL): HOME_MANAGEMENT_TIME_ENTRY: 11

## 2024-11-18 NOTE — PROGRESS NOTES
11/18/24 0923   Discharge Planning   Expected Discharge Disposition SNF     Per notes in [facility], [facility], PT rec'd Mod on 11/17, reaching out to SW to look into, patient had been accept at [facility] however family wanted a different facility at time auth was approved.

## 2024-11-18 NOTE — CARE PLAN
The patient's goals for the shift include  pt will remain HDS throughout the shift    The clinical goals for the shift include pt will remain free from falls this shift    Over the shift, the patient did make progress toward the following goals. Barriers to progression include . Recommendations to address these barriers include .

## 2024-11-18 NOTE — CARE PLAN
The patient's goals for the shift include      The clinical goals for the shift include pt will remain safe throughout the shift    Problem: Pain - Adult  Goal: Verbalizes/displays adequate comfort level or baseline comfort level  Outcome: Progressing     Problem: Safety - Adult  Goal: Free from fall injury  Outcome: Progressing     Problem: Fall/Injury  Goal: Not fall by end of shift  Outcome: Progressing  Goal: Be free from injury by end of the shift  Outcome: Progressing  Goal: Verbalize understanding of personal risk factors for fall in the hospital  Outcome: Progressing  Goal: Verbalize understanding of risk factor reduction measures to prevent injury from fall in the home  Outcome: Progressing  Goal: Use assistive devices by end of the shift  Outcome: Progressing  Goal: Pace activities to prevent fatigue by end of the shift  Outcome: Progressing

## 2024-11-18 NOTE — PROGRESS NOTES
Brannon Engel is a 69 y.o. male       Pt resting in bed  Nad  No complaints  Pain in hip controlled    Review of Systems       Vitals:       BP: 121/77   Pulse: 68   Resp:    Temp: 36.2 °C (97.2 °F)   SpO2: 98%        Scheduled medications  aspirin, 81 mg, oral, Daily  atorvastatin, 80 mg, oral, Daily  calcium carbonate-vitamin D3, 1 tablet, oral, BID  cyanocobalamin, 1,000 mcg, oral, Daily  DULoxetine, 60 mg, oral, Daily  enoxaparin, 40 mg, subcutaneous, q24h  insulin glargine, 44 Units, subcutaneous, Nightly  insulin lispro, 6 Units, subcutaneous, TID  isosorbide mononitrate ER, 60 mg, oral, Daily  lidocaine, 1 patch, transdermal, Daily  lidocaine, 1 patch, transdermal, Daily  lithium ER, 300 mg, oral, BID  methocarbamol, 500 mg, oral, q8h VILMA  ondansetron ODT, 4 mg, oral, Once  pantoprazole, 20 mg, oral, Daily before breakfast  polyethylene glycol, 17 g, oral, Daily  pregabalin, 200 mg, oral, BID  QUEtiapine, 25 mg, oral, Nightly  tamsulosin, 0.4 mg, oral, Daily  traZODone, 50 mg, oral, Nightly  trihexyphenidyl, 2 mg, oral, BID      Continuous medications     PRN medications  PRN medications: acetaminophen **OR** acetaminophen **OR** acetaminophen, HYDROcodone-acetaminophen    Lab Review         Results from last 7 days   Lab Units 11/16/24  0531   SODIUM mmol/L 138   POTASSIUM mmol/L 3.8   CHLORIDE mmol/L 107   CO2 mmol/L 25   BUN mg/dL 13   CREATININE mg/dL 0.87   CALCIUM mg/dL 9.2   GLUCOSE mg/dL 75            CT pelvis wo IV contrast   Final Result   No acute fracture or suspicious osseous lesions.   Mild osteoarthritis of the right hip.   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        Signed by: Cali Martinez 11/8/2024 3:59 PM   Dictation workstation:   [id number]      XR hip right with pelvis when performed 2 or 3 views   Final Result   Intact total left hip arthroplasty. Degenerative changes of the right   hip joint. No acute fracture or subluxation.        Signed  by: Brendan Taylor 11/7/2024 11:42 AM   Dictation workstation:   [id number]      CT head wo IV contrast   Final Result   No evidence of acute cortical infarct or intracranial hemorrhage.        Chronic changes as described.        MACRO:   None        Signed by: Dagoberto Trejo 11/7/2024 11:14 AM   Dictation workstation:   [id number]      CT cervical spine wo IV contrast   Final Result   No evidence for an acute fracture or subluxation of the cervical   spine.        MACRO:   None        Signed by: Dagoberto Trejo 11/7/2024 11:34 AM   Dictation workstation:   [id number]            Physical Exam    Constitutional   General appearance: Alert and in no acute distress.   Pulmonary   Respiratory assessment: No respiratory distress, normal respiratory rhythm and effort.    Auscultation of Lungs: Clear bilateral breath sounds.   Cardiovascular   Auscultation of heart: Apical pulse normal, heart rate and rhythm normal, normal S1 and S2, no murmurs and no pericardial rub.    Exam for edema: No peripheral edema.   Abdomen   Abdominal Exam: No bruits, normal bowel sounds, soft, non-tender, no abdominal mass palpated.    Liver and Spleen exam: No hepato-splenomegaly.   Musculoskeletal   Moves all extremities  Neurologic   Cranial nerves: Nerves 2-12 were intact, no focal neuro defects.      pt has jc prior to admission      Assessment/Plan      #Urinary tract infection  Stopped rocephin    #Hyponatremia  Has resolved    #Chronic hip pain  From chronic osteoarthritis  Lidocaine patch/pain control  PT OT consulted    #Schizophrenia  Continue home medications    #Diabetes mellitus  Sliding scale insulin coverage    #Hypertension  Stable continue home medications    PTOT    Pt stable for dc when arranged to snf  No antibiotics   Cont with jc     Plan of care discussed with: Provider, RN, Patient  Pt cleared for dc  Waiting for facility   Sodium is 138  Fluid restriction 1800      Shiv De La Rosa MD    .

## 2024-11-18 NOTE — PROGRESS NOTES
Occupational Therapy    OT Treatment    Patient Name: Brannon Engel  MRN: [id number]  Department: [place] [id number]  Room: [address] [place] [address]  Today's Date: 11/18/2024  Time Calculation  Start Time: 1049  Stop Time: 1113  Time Calculation (min): 24 min        Assessment:  End of Session Communication: Bedside nurse  End of Session Patient Position: Bed, 3 rail up, Alarm on     Plan:  Treatment Interventions: ADL retraining, Functional transfer training, UE strengthening/ROM, Endurance training, Cognitive reorientation, Patient/family training, Equipment evaluation/education, Neuromuscular reeducation, Compensatory technique education  OT Frequency: 3 times per week  OT Discharge Recommendations: Moderate intensity level of continued care  Equipment Recommended upon Discharge:  (TBD)  OT Recommended Transfer Status: Assist of 1  OT - OK to Discharge: Yes (continue per OT POC)  Treatment Interventions: ADL retraining, Functional transfer training, UE strengthening/ROM, Endurance training, Cognitive reorientation, Patient/family training, Equipment evaluation/education, Neuromuscular reeducation, Compensatory technique education    Subjective   Previous Visit Info:  OT Last Visit  OT Received On: 11/18/24  General:  General  Reason for Referral: Pt is a 70 y/o male who presented to the ED s/p fall at [facility]. Imaging (-) for acute changes.  Referred By: DARIEN Sousa-CNP  Past Medical History Relevant to Rehab:   Past Medical History:   Diagnosis Date    Dysphagia     Emphysema lung (Multi)     GERD (gastroesophageal reflux disease)     HLD (hyperlipidemia)     Hypertension, essential     Low back pain     PVD (peripheral vascular disease) (CMS-HCC)     Schizophrenia     Type 2 diabetes mellitus       Family/Caregiver Present: No  Prior to Session Communication: Bedside nurse  Patient Position Received: Bed, 3 rail up, Alarm on  General Comment: Pt supine in bed upon arrival and agreeable to treatment. Pt participates as able, declines  sitting up in chair at end of session d/t pain. RN notified.  Precautions:  Medical Precautions: Fall precautions            Pain:  Pain Assessment  Pain Assessment: 0-10  0-10 (Numeric) Pain Score: 9 (RN notified)  Pain Type: Acute pain  Pain Location:  (low back pain and near Moya insertion site)    Objective    Cognition:  Cognition  Overall Cognitive Status: Impaired  Orientation Level: Disoriented to time  Coordination:     Activities of Daily Living: UE Dressing  UE Dressing Level of Assistance: Minimum assistance, Minimal verbal cues  UE Dressing Where Assessed: Edge of bed  UE Dressing Comments: cues for sequencing to don/doff gown    LE Dressing  LE Dressing: Yes  Pants Level of Assistance: Minimum assistance, Moderate verbal cues  Sock Level of Assistance: Close supervision, Minimal verbal cues  LE Dressing Where Assessed: Edge of bed  LE Dressing Comments: cues for sequencing and to ensure pants legs above B feet prior to standing for max safety       Bed Mobility/Transfers: Bed Mobility  Bed Mobility: Yes  Bed Mobility 1  Bed Mobility 1: Supine to sitting, Sitting to supine  Level of Assistance 1: Close supervision, Minimal verbal cues  Bed Mobility Comments 1: cues for sequencing and initiation    Transfers  Transfer: Yes  Transfer 1  Technique 1: Sit to stand, Stand to sit  Transfer Device 1: Walker, Gait belt  Transfer Level of Assistance 1: Minimum assistance, Minimal verbal cues  Trials/Comments 1: Min A from EOB, CGA from chair, cues for sequencing, safe hand placement, walker safety and increased eccentric control  Transfers 2  Transfer From 2: Bed to, Chair with arms to  Transfer to 2: Bed  Technique 2: Stand pivot  Transfer Device 2: Gait belt, Walker  Transfer Level of Assistance 2: Contact guard, Moderate verbal cues  Trials/Comments 2: cues for sequencing, walker safety and alignment to chair       Sitting Balance:  Static Sitting Balance  Static Sitting-Balance Support: Bilateral upper  extremity supported, Feet supported  Static Sitting-Level of Assistance: Close supervision  Static Sitting-Comment/Number of Minutes: at EOB  Dynamic Sitting Balance  Dynamic Sitting-Comments: SBA at EOB  Standing Balance:  Static Standing Balance  Static Standing-Balance Support: Bilateral upper extremity supported  Static Standing-Level of Assistance: Close supervision  Static Standing-Comment/Number of Minutes: FWW  Dynamic Standing Balance  Dynamic Standing-Comments: CGA using FWW      Outcome Measures:[facility] Daily Activity  Putting on and taking off regular lower body clothing: A little  Bathing (including washing, rinsing, drying): A little  Putting on and taking off regular upper body clothing: A little  Toileting, which includes using toilet, bedpan or urinal: A little  Taking care of personal grooming such as brushing teeth: A little  Eating Meals: A little  Daily Activity - Total Score: 18        Education Documentation  Body Mechanics, taught by Edith Low OT at 11/18/2024 12:07 PM.  Learner: Patient  Readiness: Acceptance  Method: Explanation  Response: Verbalizes Understanding, Needs Reinforcement    Precautions, taught by Edith Low OT at 11/18/2024 12:07 PM.  Learner: Patient  Readiness: Acceptance  Method: Explanation  Response: Verbalizes Understanding, Needs Reinforcement    ADL Training, taught by Edith Low OT at 11/18/2024 12:07 PM.  Learner: Patient  Readiness: Acceptance  Method: Explanation  Response: Verbalizes Understanding, Needs Reinforcement    Education Comments  No comments found.        Goals:  Encounter Problems       Encounter Problems (Active)       ADLs       Patient will perform UB and LB bathing with minimal assist  level of assistance and grab bars, shower chair, and long-handled sponge. (Progressing)       Start:  11/08/24    Expected End:  11/22/24            Patient with complete upper body dressing with supervision level of assistance donning and doffing all  UE clothes with PRN adaptive equipment. (Progressing)       Start:  11/08/24    Expected End:  11/22/24            Patient with complete lower body dressing with minimal assist  level of assistance donning and doffing all LE clothes  with PRN adaptive equipment. (Progressing)       Start:  11/08/24    Expected End:  11/22/24            Patient will complete daily grooming tasks with set-up level of assistance and PRN adaptive equipment. (Progressing)       Start:  11/08/24    Expected End:  11/22/24            Patient will complete toileting including hygiene clothing management/hygiene with minimal assist  level of assistance and raised toilet seat and grab bars vs. BSC. (Progressing)       Start:  11/08/24    Expected End:  11/22/24               MOBILITY       Patient will perform Functional mobility x Household distances/Community Distances with contact guard assist level of assistance and least restrictive device in order to improve safety and functional mobility. (Progressing)       Start:  11/08/24    Expected End:  11/22/24               TRANSFERS       Patient will perform bed mobility modified independent level of assistance in order to improve safety and independence with mobility (Progressing)       Start:  11/08/24    Expected End:  11/22/24            Patient will complete functional transfers with least restrictive device with contact guard assist level of assistance. (Progressing)       Start:  11/08/24    Expected End:  11/22/24

## 2024-11-18 NOTE — PROGRESS NOTES
Brannon Engel is a 69 y.o. male       Pt resting in bed  Nad  No complaints  Pain in hip controlled    Awaiting DC     Review of Systems       Vitals:       BP: 121/77   Pulse: 68   Resp:    Temp: 36.2 °C (97.2 °F)   SpO2: 98%        Scheduled medications  aspirin, 81 mg, oral, Daily  atorvastatin, 80 mg, oral, Daily  calcium carbonate-vitamin D3, 1 tablet, oral, BID  cyanocobalamin, 1,000 mcg, oral, Daily  DULoxetine, 60 mg, oral, Daily  enoxaparin, 40 mg, subcutaneous, q24h  insulin glargine, 44 Units, subcutaneous, Nightly  insulin lispro, 6 Units, subcutaneous, TID  isosorbide mononitrate ER, 60 mg, oral, Daily  lidocaine, 1 patch, transdermal, Daily  lidocaine, 1 patch, transdermal, Daily  lithium ER, 300 mg, oral, BID  methocarbamol, 500 mg, oral, q8h VILMA  ondansetron ODT, 4 mg, oral, Once  pantoprazole, 20 mg, oral, Daily before breakfast  polyethylene glycol, 17 g, oral, Daily  pregabalin, 200 mg, oral, BID  QUEtiapine, 25 mg, oral, Nightly  tamsulosin, 0.4 mg, oral, Daily  traZODone, 50 mg, oral, Nightly  trihexyphenidyl, 2 mg, oral, BID      Continuous medications     PRN medications  PRN medications: acetaminophen **OR** acetaminophen **OR** acetaminophen, HYDROcodone-acetaminophen    Lab Review         Results from last 7 days   Lab Units 11/16/24  0531   SODIUM mmol/L 138   POTASSIUM mmol/L 3.8   CHLORIDE mmol/L 107   CO2 mmol/L 25   BUN mg/dL 13   CREATININE mg/dL 0.87   CALCIUM mg/dL 9.2   GLUCOSE mg/dL 75            CT pelvis wo IV contrast   Final Result   No acute fracture or suspicious osseous lesions.   Mild osteoarthritis of the right hip.   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        Signed by: Cali Martinez 11/8/2024 3:59 PM   Dictation workstation:   [id number]      XR hip right with pelvis when performed 2 or 3 views   Final Result   Intact total left hip arthroplasty. Degenerative changes of the right   hip joint. No acute fracture or  subluxation.        Signed by: Brendan Taylor 11/7/2024 11:42 AM   Dictation workstation:   [id number]      CT head wo IV contrast   Final Result   No evidence of acute cortical infarct or intracranial hemorrhage.        Chronic changes as described.        MACRO:   None        Signed by: Dagoberto Trejo 11/7/2024 11:14 AM   Dictation workstation:   [id number]      CT cervical spine wo IV contrast   Final Result   No evidence for an acute fracture or subluxation of the cervical   spine.        MACRO:   None        Signed by: Dagoberto Trejo 11/7/2024 11:34 AM   Dictation workstation:   [id number]            Physical Exam    Constitutional   General appearance: Alert and in no acute distress.   Pulmonary   Respiratory assessment: No respiratory distress, normal respiratory rhythm and effort.    Auscultation of Lungs: Clear bilateral breath sounds.   Cardiovascular   Auscultation of heart: Apical pulse normal, heart rate and rhythm normal, normal S1 and S2, no murmurs and no pericardial rub.    Exam for edema: No peripheral edema.   Abdomen   Abdominal Exam: No bruits, normal bowel sounds, soft, non-tender, no abdominal mass palpated.    Liver and Spleen exam: No hepato-splenomegaly.   Musculoskeletal   Moves all extremities  Neurologic   Cranial nerves: Nerves 2-12 were intact, no focal neuro defects.      pt has jc prior to admission      Assessment/Plan      #Urinary tract infection  Stopped rocephin    #Hyponatremia  Has resolved    #Chronic hip pain  From chronic osteoarthritis  Lidocaine patch/pain control  PT OT consulted    #Schizophrenia  Continue home medications    #Diabetes mellitus  Sliding scale insulin coverage    #Hypertension  Stable continue home medications    PTOT    Pt stable for dc when arranged to snf  No antibiotics   Cont with jc        Sodium is 138  Fluid restriction 1800      Pt cleared for dc  Waiting for facility     Plan of care discussed with: Provider, RN, Patient    Patient  case and plan of care discussed with Dr. TOM De La Rosa.    Ricardo Matthews, DARIEN - CNP  -In collaboration with Dr. TOM De La Rosa    [facility]  Office: [phone]  Fax: [phone]  I have reviewed the above note obtained and documented by the NP/PA and I personally participated in the key components. I have discussed the case and management of the patient's care. Changes made to the note, and all key components of history and physical/progress note done by me.  dw nursing  Shiv De La Rosa MD    .

## 2024-11-18 NOTE — PROGRESS NOTES
11/18/24 1041   Discharge Planning   Assistance Needed [facility] called and spoke with pt dtr Courtney to update on [facility] being unable to accept. Dtr states that she is ok with pt going to [facility] for SNF. SW sent message to [facility] to confirm that they are still able to accept.     ADDED 1124: [facility] is able to accept. [facility] asked [facility] to start precert.     ADDED 9702: Facility starts the auth for this insurance and they have started it.

## 2024-11-18 NOTE — NURSING NOTE
Pt removed two IV's this shift, pt A&OX4, VSS, no s/s of distress, all needs are met at this time.

## 2024-11-19 LAB — GLUCOSE BLD MANUAL STRIP-MCNC: 127 MG/DL (ref 74–99)

## 2024-11-19 PROCEDURE — 2500000004 HC RX 250 GENERAL PHARMACY W/ HCPCS (ALT 636 FOR OP/ED): Performed by: NURSE PRACTITIONER

## 2024-11-19 PROCEDURE — 2500000001 HC RX 250 WO HCPCS SELF ADMINISTERED DRUGS (ALT 637 FOR MEDICARE OP): Performed by: NURSE PRACTITIONER

## 2024-11-19 PROCEDURE — 97530 THERAPEUTIC ACTIVITIES: CPT | Mod: GP,CQ | Performed by: PHYSICAL THERAPY ASSISTANT

## 2024-11-19 PROCEDURE — 82947 ASSAY GLUCOSE BLOOD QUANT: CPT

## 2024-11-19 PROCEDURE — 1100000001 HC PRIVATE ROOM DAILY

## 2024-11-19 PROCEDURE — 97116 GAIT TRAINING THERAPY: CPT | Mod: GP,CQ | Performed by: PHYSICAL THERAPY ASSISTANT

## 2024-11-19 PROCEDURE — 2500000001 HC RX 250 WO HCPCS SELF ADMINISTERED DRUGS (ALT 637 FOR MEDICARE OP): Performed by: FAMILY MEDICINE

## 2024-11-19 PROCEDURE — G0378 HOSPITAL OBSERVATION PER HR: HCPCS

## 2024-11-19 PROCEDURE — 2500000002 HC RX 250 W HCPCS SELF ADMINISTERED DRUGS (ALT 637 FOR MEDICARE OP, ALT 636 FOR OP/ED): Performed by: NURSE PRACTITIONER

## 2024-11-19 PROCEDURE — 2500000002 HC RX 250 W HCPCS SELF ADMINISTERED DRUGS (ALT 637 FOR MEDICARE OP, ALT 636 FOR OP/ED): Performed by: FAMILY MEDICINE

## 2024-11-19 RX ADMIN — METHOCARBAMOL TABLETS 500 MG: 500 TABLET, COATED ORAL at 06:41

## 2024-11-19 RX ADMIN — INSULIN LISPRO 6 UNITS: 100 INJECTION, SOLUTION INTRAVENOUS; SUBCUTANEOUS at 12:20

## 2024-11-19 RX ADMIN — QUETIAPINE FUMARATE 25 MG: 25 TABLET ORAL at 20:21

## 2024-11-19 RX ADMIN — PREGABALIN 200 MG: 100 CAPSULE ORAL at 08:24

## 2024-11-19 RX ADMIN — ATORVASTATIN CALCIUM 80 MG: 80 TABLET, FILM COATED ORAL at 08:24

## 2024-11-19 RX ADMIN — PREGABALIN 200 MG: 100 CAPSULE ORAL at 20:20

## 2024-11-19 RX ADMIN — LITHIUM CARBONATE 300 MG: 300 TABLET, EXTENDED RELEASE ORAL at 20:21

## 2024-11-19 RX ADMIN — TAMSULOSIN HYDROCHLORIDE 0.4 MG: 0.4 CAPSULE ORAL at 08:24

## 2024-11-19 RX ADMIN — INSULIN GLARGINE 44 UNITS: 100 INJECTION, SOLUTION SUBCUTANEOUS at 20:19

## 2024-11-19 RX ADMIN — LITHIUM CARBONATE 300 MG: 300 TABLET, EXTENDED RELEASE ORAL at 08:24

## 2024-11-19 RX ADMIN — METHOCARBAMOL TABLETS 500 MG: 500 TABLET, COATED ORAL at 21:45

## 2024-11-19 RX ADMIN — INSULIN LISPRO 6 UNITS: 100 INJECTION, SOLUTION INTRAVENOUS; SUBCUTANEOUS at 08:21

## 2024-11-19 RX ADMIN — HYDROCODONE BITARTRATE AND ACETAMINOPHEN 1 TABLET: 5; 325 TABLET ORAL at 17:40

## 2024-11-19 RX ADMIN — Medication 1 TABLET: at 20:20

## 2024-11-19 RX ADMIN — PANTOPRAZOLE SODIUM 20 MG: 40 TABLET, DELAYED RELEASE ORAL at 06:41

## 2024-11-19 RX ADMIN — TRIHEXYPHENIDYL HYDROCHLORIDE 2 MG: 2 TABLET ORAL at 09:55

## 2024-11-19 RX ADMIN — METHOCARBAMOL TABLETS 500 MG: 500 TABLET, COATED ORAL at 13:47

## 2024-11-19 RX ADMIN — HYDROCODONE BITARTRATE AND ACETAMINOPHEN 1 TABLET: 5; 325 TABLET ORAL at 09:57

## 2024-11-19 RX ADMIN — ENOXAPARIN SODIUM 40 MG: 40 INJECTION SUBCUTANEOUS at 17:26

## 2024-11-19 RX ADMIN — ASPIRIN 81 MG: 81 TABLET, COATED ORAL at 08:24

## 2024-11-19 RX ADMIN — INSULIN LISPRO 6 UNITS: 100 INJECTION, SOLUTION INTRAVENOUS; SUBCUTANEOUS at 17:26

## 2024-11-19 RX ADMIN — ISOSORBIDE MONONITRATE 60 MG: 30 TABLET, EXTENDED RELEASE ORAL at 08:24

## 2024-11-19 RX ADMIN — Medication 1 TABLET: at 08:24

## 2024-11-19 RX ADMIN — DULOXETINE HYDROCHLORIDE 60 MG: 30 CAPSULE, DELAYED RELEASE ORAL at 08:24

## 2024-11-19 RX ADMIN — Medication 1000 MCG: at 08:24

## 2024-11-19 RX ADMIN — TRIHEXYPHENIDYL HYDROCHLORIDE 2 MG: 2 TABLET ORAL at 17:40

## 2024-11-19 RX ADMIN — ACETAMINOPHEN 325MG 650 MG: 325 TABLET ORAL at 20:21

## 2024-11-19 RX ADMIN — TRAZODONE HYDROCHLORIDE 50 MG: 50 TABLET ORAL at 20:20

## 2024-11-19 ASSESSMENT — PAIN - FUNCTIONAL ASSESSMENT
PAIN_FUNCTIONAL_ASSESSMENT: 0-10

## 2024-11-19 ASSESSMENT — PAIN DESCRIPTION - ORIENTATION: ORIENTATION: MID

## 2024-11-19 ASSESSMENT — COGNITIVE AND FUNCTIONAL STATUS - GENERAL
MOVING TO AND FROM BED TO CHAIR: A LITTLE
MOBILITY SCORE: 15
TURNING FROM BACK TO SIDE WHILE IN FLAT BAD: A LITTLE
MOVING FROM LYING ON BACK TO SITTING ON SIDE OF FLAT BED WITH BEDRAILS: A LITTLE
STANDING UP FROM CHAIR USING ARMS: A LITTLE
WALKING IN HOSPITAL ROOM: A LOT
CLIMB 3 TO 5 STEPS WITH RAILING: TOTAL

## 2024-11-19 ASSESSMENT — PAIN SCALES - GENERAL
PAINLEVEL_OUTOF10: 0 - NO PAIN
PAINLEVEL_OUTOF10: 6
PAINLEVEL_OUTOF10: 8
PAINLEVEL_OUTOF10: 3
PAINLEVEL_OUTOF10: 0 - NO PAIN
PAINLEVEL_OUTOF10: 7
PAINLEVEL_OUTOF10: 8
PAINLEVEL_OUTOF10: 8

## 2024-11-19 ASSESSMENT — PAIN DESCRIPTION - LOCATION
LOCATION: RECTUM
LOCATION: BUTTOCKS
LOCATION: BACK

## 2024-11-19 NOTE — PROGRESS NOTES
11/19/2024 1142  SW was asked to call pts daughter to discuss placement as the prior notes reporting the F[facility], the pt is saying that is not correct. SW attempted to call daughter, got vm, left message and will attempt again.    11/19/2024 1448  SW attempted to reach daughter again in regards to placement. Left another vm.    11/19/2024 1601  Pts daughter called back, she expressed frustrations regarding her father saying he doesn't want to go to the FOC she picked due to location. She stated it is due to the location being too far from his girlfriend. She reported she has documentation showing that he was deemed incompetent and she will get it to the staff as that was when she was made POA due to him not being capable of making safe choices for himself. She reported the document is at home and she will get it after work so the hospital has it for his records.

## 2024-11-19 NOTE — PROGRESS NOTES
Brannon Engel is a 69 y.o. male       Remains stable for dc     Review of Systems       Vitals:       BP: 121/77   Pulse: 68   Resp:    Temp: 36.2 °C (97.2 °F)   SpO2: 98%        Scheduled medications  aspirin, 81 mg, oral, Daily  atorvastatin, 80 mg, oral, Daily  calcium carbonate-vitamin D3, 1 tablet, oral, BID  cyanocobalamin, 1,000 mcg, oral, Daily  DULoxetine, 60 mg, oral, Daily  enoxaparin, 40 mg, subcutaneous, q24h  insulin glargine, 44 Units, subcutaneous, Nightly  insulin lispro, 6 Units, subcutaneous, TID  isosorbide mononitrate ER, 60 mg, oral, Daily  lidocaine, 1 patch, transdermal, Daily  lidocaine, 1 patch, transdermal, Daily  lithium ER, 300 mg, oral, BID  methocarbamol, 500 mg, oral, q8h VILMA  ondansetron ODT, 4 mg, oral, Once  pantoprazole, 20 mg, oral, Daily before breakfast  polyethylene glycol, 17 g, oral, Daily  pregabalin, 200 mg, oral, BID  QUEtiapine, 25 mg, oral, Nightly  tamsulosin, 0.4 mg, oral, Daily  traZODone, 50 mg, oral, Nightly  trihexyphenidyl, 2 mg, oral, BID      Continuous medications     PRN medications  PRN medications: acetaminophen **OR** acetaminophen **OR** acetaminophen, HYDROcodone-acetaminophen    Lab Review         Results from last 7 days   Lab Units 11/16/24  0531   SODIUM mmol/L 138   POTASSIUM mmol/L 3.8   CHLORIDE mmol/L 107   CO2 mmol/L 25   BUN mg/dL 13   CREATININE mg/dL 0.87   CALCIUM mg/dL 9.2   GLUCOSE mg/dL 75            CT pelvis wo IV contrast   Final Result   No acute fracture or suspicious osseous lesions.   Mild osteoarthritis of the right hip.   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        Signed by: Cali Martinez 11/8/2024 3:59 PM   Dictation workstation:   [id number]      XR hip right with pelvis when performed 2 or 3 views   Final Result   Intact total left hip arthroplasty. Degenerative changes of the right   hip joint. No acute fracture or subluxation.        Signed by: Brendan Taylor 11/7/2024  11:42 AM   Dictation workstation:   [id number]      CT head wo IV contrast   Final Result   No evidence of acute cortical infarct or intracranial hemorrhage.        Chronic changes as described.        MACRO:   None        Signed by: Dagoberto Arguetavalerie 11/7/2024 11:14 AM   Dictation workstation:   [id number]      CT cervical spine wo IV contrast   Final Result   No evidence for an acute fracture or subluxation of the cervical   spine.        MACRO:   None        Signed by: Dagoberto Arguetavalerie 11/7/2024 11:34 AM   Dictation workstation:   [id number]            Physical Exam    Constitutional   General appearance: Alert and in no acute distress.   Pulmonary   Respiratory assessment: No respiratory distress, normal respiratory rhythm and effort.    Auscultation of Lungs: Clear bilateral breath sounds.   Cardiovascular   Auscultation of heart: Apical pulse normal, heart rate and rhythm normal, normal S1 and S2, no murmurs and no pericardial rub.    Exam for edema: No peripheral edema.   Abdomen   Abdominal Exam: No bruits, normal bowel sounds, soft, non-tender, no abdominal mass palpated.    Liver and Spleen exam: No hepato-splenomegaly.   Musculoskeletal   Moves all extremities  Neurologic   Cranial nerves: Nerves 2-12 were intact, no focal neuro defects.      pt has jc prior to admission      Assessment/Plan      #Urinary tract infection  Stopped rocephin    #Hyponatremia  Has resolved    #Chronic hip pain  From chronic osteoarthritis  Lidocaine patch/pain control  PT OT consulted    #Schizophrenia  Continue home medications    #Diabetes mellitus  Sliding scale insulin coverage    #Hypertension  Stable continue home medications    PTOT    Pt stable for dc when arranged to snf  No antibiotics   Cont with jc        Sodium is 138  Fluid restriction 1800      Pt cleared for dc  Waiting for facility     Plan of care discussed with: Provider, RN, Patient      ##Transcribed for Dr. TOM De La Rosa##  I have reviewed the above  note obtained and documented by the NP/PA and I personally participated in the key components. I have discussed the case and management of the patient's care. Changes made to the note, and all key components of history and physical/progress note done by me.  dw nursing  Shiv De La Rosa MD

## 2024-11-19 NOTE — PROGRESS NOTES
Physical Therapy    Physical Therapy Treatment    Patient Name: Brannon Engel  MRN: [id number]  Department: [place] [address]  Room: [address] [place] [address]  Today's Date: 11/19/2024  Time Calculation  Start Time: 1412  Stop Time: 1436  Time Calculation (min): 24 min         Assessment/Plan   PT Assessment  End of Session Communication: Bedside nurse  Assessment Comment:  (pt demo fair tolerance to increased activity)  End of Session Patient Position: Bed, 3 rail up, Alarm on  PT Plan  Inpatient/Swing Bed or Outpatient: Inpatient  PT Plan  Treatment/Interventions: Bed mobility, Transfer training, Gait training, Therapeutic activity  PT Plan: Ongoing PT  PT Frequency: 3 times per week  PT Discharge Recommendations: Moderate intensity level of continued care  Equipment Recommended upon Discharge:  (TBD)  PT Recommended Transfer Status: Assist x1  PT - OK to Discharge: Yes (per PT POC)      General Visit Information:   PT  Visit  PT Received On: 11/19/24  General  Reason for Referral: Pt is a 68 y/o male who presented to the ED s/p fall at [facility]. Imaging (-) for acute changes.  Referred By: PARI Sousa  Prior to Session Communication: Bedside nurse  Patient Position Received: Bed, 3 rail up, Alarm on  Preferred Learning Style: auditory, kinesthetic, verbal  General Comment:  (pt agreeable to tx)    Subjective   Precautions:       Vital Signs (Past 2hrs)                 Objective   Pain:  Pain Assessment  Pain Assessment: 0-10  0-10 (Numeric) Pain Score: 8 (RN aware)  Pain Type: Chronic pain  Pain Location: Buttocks  Cognition:  Cognition  Orientation Level: Oriented X4  Coordination:     Postural Control:  Static Standing Balance  Static Standing-Balance Support: Bilateral upper extremity supported  Static Standing-Level of Assistance: Minimum assistance  Dynamic Standing Balance  Dynamic Standing-Balance Support: Bilateral upper extremity supported  Dynamic Standing-Level of Assistance: Moderate assistance  Dynamic Standing-Balance:  Lateral lean, Forward lean, Reaching for objects, Turning  Extremity/Trunk Assessments:    Activity Tolerance:     Treatments:            Bed Mobility  Bed Mobility: Yes  Bed Mobility 1  Bed Mobility 1: Supine to sitting, Sitting to supine  Level of Assistance 1: Contact guard    Ambulation/Gait Training  Ambulation/Gait Training Performed: Yes  Ambulation/Gait Training 1  Surface 1: Level tile  Device 1: Rolling walker  Gait Support Devices: Gait belt  Assistance 1: Moderate verbal cues, Moderate tactile cues, Minimum assistance  Quality of Gait 1: Narrow base of support, Inconsistent stride length, Decreased step length, Shuffling gait  Comments/Distance (ft) 1: 20ft x 2 (pt dmeo slow short step to pattern, cues for proper hand placement and sequencing.)  Transfers  Transfer: Yes  Transfer 1  Transfer From 1: Sit to, Stand to  Technique 1: Sit to stand, Stand to sit  Transfer Device 1: Walker, Gait belt  Transfer Level of Assistance 1: Minimum assistance, Minimal verbal cues  Trials/Comments 1:  (pt req increased cues for proper hand placement and sequencing.)         Outcome Measures:  [facility] Basic Mobility  Turning from your back to your side while in a flat bed without using bedrails: A little  Moving from lying on your back to sitting on the side of a flat bed without using bedrails: A little  Moving to and from bed to chair (including a wheelchair): A little  Standing up from a chair using your arms (e.g. wheelchair or bedside chair): A little  To walk in hospital room: A lot  Climbing 3-5 steps with railing: Total  Basic Mobility - Total Score: 15    Education Documentation  Body Mechanics, taught by Huey Hudson PTA at 11/19/2024  3:02 PM.  Learner: Patient  Readiness: Acceptance  Method: Explanation  Response: Needs Reinforcement    Mobility Training, taught by Huey Hudson PTA at 11/19/2024  3:02 PM.  Learner: Patient  Readiness: Acceptance  Method: Explanation  Response: Needs  Reinforcement    Education Comments  No comments found.        OP EDUCATION:       Encounter Problems       Encounter Problems (Active)       Balance       Pt will tolerate 10+ mins dynamic standing balance activities with CGA or less and no LOB.  (Progressing)       Start:  11/08/24    Expected End:  11/22/24               Mobility       STG - Patient will ambulate 50 ft with CGA and a RW.  (Progressing)       Start:  11/08/24    Expected End:  11/22/24            Pt will tolerate 15 reps of each LE exercise in order to improve strength and endurance.   (Progressing)       Start:  11/08/24    Expected End:  11/22/24               PT Transfers       STG - Patient will perform bed mobility independently. (Progressing)       Start:  11/08/24    Expected End:  11/22/24            STG - Patient will transfer sit to and from stand with CGA and a RW.  (Progressing)       Start:  11/08/24    Expected End:  11/22/24               Pain - Adult

## 2024-11-19 NOTE — PROGRESS NOTES
Care Coordinator Note:    Plan: Patient is med ready to dc to new SNF. Auth pending for [facility]. Patient is stating does not want to go to this SNF. SW to call dtr and follow up on decision making for SNF.     Status: inpatient  Payor: [employer] dual  Disposition: NEW SNF- [facility]- auth pending since 11/18  Barrier: AUTH  ADOD: med ready. ? Today vs tomorrow    1435- [facility] spoke with ANGEL Dunne from [facility]. At this time they are unable to accept patient back. They will be able to take patient back only if insurance auth is denied for SNF stay. Then they will need [facility] PT OT orders on AVS and they can set up the PT OT at [facility].   Patient is aware that Ampacs have improved to 15/18 and PT will work with patient today and there is possibility of patient returning to [facility]       Ivory Dennis [facility]      11/19/24 1202   Discharge Planning   Expected Discharge Disposition SNF

## 2024-11-19 NOTE — CARE PLAN
The patient's goals for the shift include  pt will remain HDS throughout the shift    The clinical goals for the shift include pt will remain safe this shift    Over the shift, the patient did make progress toward the following goals. Barriers to progression include . Recommendations to address these barriers include .

## 2024-11-20 VITALS
RESPIRATION RATE: 18 BRPM | DIASTOLIC BLOOD PRESSURE: 54 MMHG | WEIGHT: 174.9 LBS | HEIGHT: 69 IN | SYSTOLIC BLOOD PRESSURE: 96 MMHG | OXYGEN SATURATION: 88 % | BODY MASS INDEX: 25.9 KG/M2 | TEMPERATURE: 98.4 F | HEART RATE: 100 BPM

## 2024-11-20 LAB — GLUCOSE BLD MANUAL STRIP-MCNC: 110 MG/DL (ref 74–99)

## 2024-11-20 PROCEDURE — 82947 ASSAY GLUCOSE BLOOD QUANT: CPT

## 2024-11-20 PROCEDURE — G0378 HOSPITAL OBSERVATION PER HR: HCPCS

## 2024-11-20 PROCEDURE — 2500000001 HC RX 250 WO HCPCS SELF ADMINISTERED DRUGS (ALT 637 FOR MEDICARE OP): Performed by: FAMILY MEDICINE

## 2024-11-20 PROCEDURE — 2500000001 HC RX 250 WO HCPCS SELF ADMINISTERED DRUGS (ALT 637 FOR MEDICARE OP): Performed by: NURSE PRACTITIONER

## 2024-11-20 PROCEDURE — 2500000002 HC RX 250 W HCPCS SELF ADMINISTERED DRUGS (ALT 637 FOR MEDICARE OP, ALT 636 FOR OP/ED): Performed by: NURSE PRACTITIONER

## 2024-11-20 PROCEDURE — 2500000002 HC RX 250 W HCPCS SELF ADMINISTERED DRUGS (ALT 637 FOR MEDICARE OP, ALT 636 FOR OP/ED): Performed by: FAMILY MEDICINE

## 2024-11-20 RX ADMIN — DULOXETINE HYDROCHLORIDE 60 MG: 30 CAPSULE, DELAYED RELEASE ORAL at 09:05

## 2024-11-20 RX ADMIN — TRIHEXYPHENIDYL HYDROCHLORIDE 2 MG: 2 TABLET ORAL at 09:09

## 2024-11-20 RX ADMIN — ASPIRIN 81 MG: 81 TABLET, COATED ORAL at 09:04

## 2024-11-20 RX ADMIN — ISOSORBIDE MONONITRATE 60 MG: 30 TABLET, EXTENDED RELEASE ORAL at 09:05

## 2024-11-20 RX ADMIN — Medication 1 TABLET: at 09:04

## 2024-11-20 RX ADMIN — Medication 1000 MCG: at 09:05

## 2024-11-20 RX ADMIN — ATORVASTATIN CALCIUM 80 MG: 80 TABLET, FILM COATED ORAL at 09:05

## 2024-11-20 RX ADMIN — TAMSULOSIN HYDROCHLORIDE 0.4 MG: 0.4 CAPSULE ORAL at 09:05

## 2024-11-20 RX ADMIN — INSULIN LISPRO 6 UNITS: 100 INJECTION, SOLUTION INTRAVENOUS; SUBCUTANEOUS at 13:05

## 2024-11-20 RX ADMIN — HYDROCODONE BITARTRATE AND ACETAMINOPHEN 1 TABLET: 5; 325 TABLET ORAL at 09:04

## 2024-11-20 RX ADMIN — INSULIN LISPRO 6 UNITS: 100 INJECTION, SOLUTION INTRAVENOUS; SUBCUTANEOUS at 09:05

## 2024-11-20 RX ADMIN — PREGABALIN 200 MG: 100 CAPSULE ORAL at 09:04

## 2024-11-20 RX ADMIN — METHOCARBAMOL TABLETS 500 MG: 500 TABLET, COATED ORAL at 14:44

## 2024-11-20 RX ADMIN — PANTOPRAZOLE SODIUM 20 MG: 40 TABLET, DELAYED RELEASE ORAL at 05:16

## 2024-11-20 RX ADMIN — LITHIUM CARBONATE 300 MG: 300 TABLET, EXTENDED RELEASE ORAL at 09:05

## 2024-11-20 RX ADMIN — METHOCARBAMOL TABLETS 500 MG: 500 TABLET, COATED ORAL at 05:16

## 2024-11-20 ASSESSMENT — PAIN SCALES - GENERAL: PAINLEVEL_OUTOF10: 9

## 2024-11-20 NOTE — CARE PLAN
The patient's goals for the shift include  minal sleep    The clinical goals for the shift include safety      Problem: Pain - Adult  Goal: Verbalizes/displays adequate comfort level or baseline comfort level  Outcome: Progressing     Problem: Safety - Adult  Goal: Free from fall injury  Outcome: Progressing     Problem: Discharge Planning  Goal: Discharge to home or other facility with appropriate resources  Outcome: Progressing     Problem: Chronic Conditions and Co-morbidities  Goal: Patient's chronic conditions and co-morbidity symptoms are monitored and maintained or improved  Outcome: Progressing     Problem: Fall/Injury  Goal: Not fall by end of shift  Outcome: Progressing  Goal: Be free from injury by end of the shift  Outcome: Progressing  Goal: Verbalize understanding of personal risk factors for fall in the hospital  Outcome: Progressing  Goal: Verbalize understanding of risk factor reduction measures to prevent injury from fall in the home  Outcome: Progressing  Goal: Use assistive devices by end of the shift  Outcome: Progressing  Goal: Pace activities to prevent fatigue by end of the shift  Outcome: Progressing     Problem: Pain  Goal: Takes deep breaths with improved pain control throughout the shift  Outcome: Progressing  Goal: Turns in bed with improved pain control throughout the shift  Outcome: Progressing  Goal: Walks with improved pain control throughout the shift  Outcome: Progressing  Goal: Performs ADL's with improved pain control throughout shift  Outcome: Progressing  Goal: Participates in PT with improved pain control throughout the shift  Outcome: Progressing  Goal: Free from opioid side effects throughout the shift  Outcome: Progressing  Goal: Free from acute confusion related to pain meds throughout the shift  Outcome: Progressing     Problem: Skin  Goal: Decreased wound size/increased tissue granulation at next dressing change  Outcome: Progressing  Goal: Participates in  plan/prevention/treatment measures  Outcome: Progressing  Goal: Prevent/manage excess moisture  Outcome: Progressing  Goal: Prevent/minimize sheer/friction injuries  Outcome: Progressing  Goal: Promote/optimize nutrition  Outcome: Progressing  Goal: Promote skin healing  Outcome: Progressing

## 2024-11-20 NOTE — PROGRESS NOTES
Brannon Engel is a 69 y.o. male     Resting in bed  Nad  No new issues   Remains stable for dc     Review of Systems       Vitals:       BP: 121/77   Pulse: 68   Resp:    Temp: 36.2 °C (97.2 °F)   SpO2: 98%        Scheduled medications  aspirin, 81 mg, oral, Daily  atorvastatin, 80 mg, oral, Daily  calcium carbonate-vitamin D3, 1 tablet, oral, BID  cyanocobalamin, 1,000 mcg, oral, Daily  DULoxetine, 60 mg, oral, Daily  enoxaparin, 40 mg, subcutaneous, q24h  insulin glargine, 44 Units, subcutaneous, Nightly  insulin lispro, 6 Units, subcutaneous, TID  isosorbide mononitrate ER, 60 mg, oral, Daily  lidocaine, 1 patch, transdermal, Daily  lidocaine, 1 patch, transdermal, Daily  lithium ER, 300 mg, oral, BID  methocarbamol, 500 mg, oral, q8h VILMA  ondansetron ODT, 4 mg, oral, Once  pantoprazole, 20 mg, oral, Daily before breakfast  polyethylene glycol, 17 g, oral, Daily  pregabalin, 200 mg, oral, BID  QUEtiapine, 25 mg, oral, Nightly  tamsulosin, 0.4 mg, oral, Daily  traZODone, 50 mg, oral, Nightly  trihexyphenidyl, 2 mg, oral, BID      Continuous medications     PRN medications  PRN medications: acetaminophen **OR** acetaminophen **OR** acetaminophen, HYDROcodone-acetaminophen    Lab Review         Results from last 7 days   Lab Units 11/16/24  0531   SODIUM mmol/L 138   POTASSIUM mmol/L 3.8   CHLORIDE mmol/L 107   CO2 mmol/L 25   BUN mg/dL 13   CREATININE mg/dL 0.87   CALCIUM mg/dL 9.2   GLUCOSE mg/dL 75            CT pelvis wo IV contrast   Final Result   No acute fracture or suspicious osseous lesions.   Mild osteoarthritis of the right hip.   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        Signed by: Cali Martinez 11/8/2024 3:59 PM   Dictation workstation:   [id number]      XR hip right with pelvis when performed 2 or 3 views   Final Result   Intact total left hip arthroplasty. Degenerative changes of the right   hip joint. No acute fracture or subluxation.         Signed by: Brendan Taylor 11/7/2024 11:42 AM   Dictation workstation:   [id number]      CT head wo IV contrast   Final Result   No evidence of acute cortical infarct or intracranial hemorrhage.        Chronic changes as described.        MACRO:   None        Signed by: Dagoberto Trejo 11/7/2024 11:14 AM   Dictation workstation:   [id number]      CT cervical spine wo IV contrast   Final Result   No evidence for an acute fracture or subluxation of the cervical   spine.        MACRO:   None        Signed by: Dagoberto Trejo 11/7/2024 11:34 AM   Dictation workstation:   [id number]            Physical Exam    Constitutional   General appearance: Alert and in no acute distress.   Pulmonary   Respiratory assessment: No respiratory distress, normal respiratory rhythm and effort.    Auscultation of Lungs: Clear bilateral breath sounds.   Cardiovascular   Auscultation of heart: Apical pulse normal, heart rate and rhythm normal, normal S1 and S2, no murmurs and no pericardial rub.    Exam for edema: No peripheral edema.   Abdomen   Abdominal Exam: No bruits, normal bowel sounds, soft, non-tender, no abdominal mass palpated.    Liver and Spleen exam: No hepato-splenomegaly.   Musculoskeletal   Moves all extremities  Neurologic   Cranial nerves: Nerves 2-12 were intact, no focal neuro defects.      pt has jc prior to admission      Assessment/Plan      #Urinary tract infection  Stopped rocephin    #Hyponatremia  Has resolved    #Chronic hip pain  From chronic osteoarthritis  Lidocaine patch/pain control  PT OT consulted    #Schizophrenia  Continue home medications    #Diabetes mellitus  Sliding scale insulin coverage    #Hypertension  Stable continue home medications    PTOT    Pt stable for dc when arranged to snf  No antibiotics   Cont with jc        Sodium is 138  Fluid restriction 1800      Pt cleared for dc  Waiting for facility     Plan of care discussed with: Provider, RN, Patient          Patient case and  plan of care discussed with Dr. OTM De La Rosa.    DARIEN Persaud - CNP  -In collaboration with Dr. TOM De La Rosa    [facility]  Office: [phone]  Fax: [phone]  I have reviewed the above note obtained and documented by the NP/PA and I personally participated in the key components. I have discussed the case and management of the patient's care. Changes made to the note, and all key components of history and physical/progress note done by me.   nursing  Shiv De La Rosa MD

## 2024-11-20 NOTE — PROGRESS NOTES
Brannon Engel is a 69 y.o. male       Remains stable for dc     Review of Systems       Vitals:       BP: 121/77   Pulse: 68   Resp:    Temp: 36.2 °C (97.2 °F)   SpO2: 98%        Scheduled medications  aspirin, 81 mg, oral, Daily  atorvastatin, 80 mg, oral, Daily  calcium carbonate-vitamin D3, 1 tablet, oral, BID  cyanocobalamin, 1,000 mcg, oral, Daily  DULoxetine, 60 mg, oral, Daily  enoxaparin, 40 mg, subcutaneous, q24h  insulin glargine, 44 Units, subcutaneous, Nightly  insulin lispro, 6 Units, subcutaneous, TID  isosorbide mononitrate ER, 60 mg, oral, Daily  lidocaine, 1 patch, transdermal, Daily  lidocaine, 1 patch, transdermal, Daily  lithium ER, 300 mg, oral, BID  methocarbamol, 500 mg, oral, q8h VILMA  ondansetron ODT, 4 mg, oral, Once  pantoprazole, 20 mg, oral, Daily before breakfast  polyethylene glycol, 17 g, oral, Daily  pregabalin, 200 mg, oral, BID  QUEtiapine, 25 mg, oral, Nightly  tamsulosin, 0.4 mg, oral, Daily  traZODone, 50 mg, oral, Nightly  trihexyphenidyl, 2 mg, oral, BID      Continuous medications     PRN medications  PRN medications: acetaminophen **OR** acetaminophen **OR** acetaminophen, HYDROcodone-acetaminophen    Lab Review         Results from last 7 days   Lab Units 11/16/24  0531   SODIUM mmol/L 138   POTASSIUM mmol/L 3.8   CHLORIDE mmol/L 107   CO2 mmol/L 25   BUN mg/dL 13   CREATININE mg/dL 0.87   CALCIUM mg/dL 9.2   GLUCOSE mg/dL 75            CT pelvis wo IV contrast   Final Result   No acute fracture or suspicious osseous lesions.   Mild osteoarthritis of the right hip.   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        Signed by: Cali Martinez 11/8/2024 3:59 PM   Dictation workstation:   [id number]      XR hip right with pelvis when performed 2 or 3 views   Final Result   Intact total left hip arthroplasty. Degenerative changes of the right   hip joint. No acute fracture or subluxation.        Signed by: Brendan Taylor 11/7/2024  11:42 AM   Dictation workstation:   [id number]      CT head wo IV contrast   Final Result   No evidence of acute cortical infarct or intracranial hemorrhage.        Chronic changes as described.        MACRO:   None        Signed by: Dagoberto Arguetavalerie 11/7/2024 11:14 AM   Dictation workstation:   [id number]      CT cervical spine wo IV contrast   Final Result   No evidence for an acute fracture or subluxation of the cervical   spine.        MACRO:   None        Signed by: Dagoberto Arguetavalerie 11/7/2024 11:34 AM   Dictation workstation:   [id number]            Physical Exam    Constitutional   General appearance: Alert and in no acute distress.   Pulmonary   Respiratory assessment: No respiratory distress, normal respiratory rhythm and effort.    Auscultation of Lungs: Clear bilateral breath sounds.   Cardiovascular   Auscultation of heart: Apical pulse normal, heart rate and rhythm normal, normal S1 and S2, no murmurs and no pericardial rub.    Exam for edema: No peripheral edema.   Abdomen   Abdominal Exam: No bruits, normal bowel sounds, soft, non-tender, no abdominal mass palpated.    Liver and Spleen exam: No hepato-splenomegaly.   Musculoskeletal   Moves all extremities  Neurologic   Cranial nerves: Nerves 2-12 were intact, no focal neuro defects.      pt has jc prior to admission      Assessment/Plan      #Urinary tract infection  Stopped rocephin    #Hyponatremia  Has resolved    #Chronic hip pain  From chronic osteoarthritis  Lidocaine patch/pain control  PT OT consulted    #Schizophrenia  Continue home medications    #Diabetes mellitus  Sliding scale insulin coverage    #Hypertension  Stable continue home medications    PTOT    Pt stable for dc when arranged to snf  No antibiotics   Cont with jc        Sodium is 138  Fluid restriction 1800      Pt cleared for dc  Waiting for facility     Plan of care discussed with: Provider, RN, Patient      ##Transcribed for Dr. TOM De La Rosa##  I have reviewed the above  note obtained and documented by the NP/PA and I personally participated in the key components. I have discussed the case and management of the patient's care. Changes made to the note, and all key components of history and physical/progress note done by me.  dw nursing  Shiv De La Rosa MD

## 2024-11-20 NOTE — NURSING NOTE
Attempted to give nurse to nurse report to [place]; nobody at nurses station to answer the phone; will continue to try      1430 nurse to nurse report given to Sarah at [place]

## 2024-11-20 NOTE — CARE PLAN
Problem: Pain - Adult  Goal: Verbalizes/displays adequate comfort level or baseline comfort level  Outcome: Adequate for Discharge     Problem: Safety - Adult  Goal: Free from fall injury  Outcome: Adequate for Discharge     Problem: Discharge Planning  Goal: Discharge to home or other facility with appropriate resources  Outcome: Adequate for Discharge     Problem: Chronic Conditions and Co-morbidities  Goal: Patient's chronic conditions and co-morbidity symptoms are monitored and maintained or improved  Outcome: Adequate for Discharge     Problem: Fall/Injury  Goal: Not fall by end of shift  11/20/2024 1557 by Wendy Gage RN  Outcome: Adequate for Discharge  11/20/2024 0948 by Wendy Gage RN  Outcome: Progressing  Goal: Be free from injury by end of the shift  11/20/2024 1557 by Wendy Gage RN  Outcome: Adequate for Discharge  11/20/2024 0948 by Wendy Gage RN  Outcome: Progressing  Goal: Verbalize understanding of personal risk factors for fall in the hospital  11/20/2024 1557 by Wendy Gage RN  Outcome: Adequate for Discharge  11/20/2024 0948 by Wendy Gage RN  Outcome: Progressing  Goal: Verbalize understanding of risk factor reduction measures to prevent injury from fall in the home  11/20/2024 1557 by Wendy Gage RN  Outcome: Adequate for Discharge  11/20/2024 0948 by Wendy Gage RN  Outcome: Progressing  Goal: Use assistive devices by end of the shift  11/20/2024 1557 by Wendy Gage RN  Outcome: Adequate for Discharge  11/20/2024 0948 by Wendy Gage RN  Outcome: Progressing  Goal: Pace activities to prevent fatigue by end of the shift  11/20/2024 1557 by Wendy Gage RN  Outcome: Adequate for Discharge  11/20/2024 0948 by Wendy Gage RN  Outcome: Progressing     Problem: Pain  Goal: Takes deep breaths with improved pain control throughout the shift  Outcome: Adequate for Discharge  Goal: Turns in bed with improved pain control throughout the shift  Outcome: Adequate for  Discharge  Goal: Walks with improved pain control throughout the shift  Outcome: Adequate for Discharge  Goal: Performs ADL's with improved pain control throughout shift  Outcome: Adequate for Discharge  Goal: Participates in PT with improved pain control throughout the shift  Outcome: Adequate for Discharge  Goal: Free from opioid side effects throughout the shift  Outcome: Adequate for Discharge  Goal: Free from acute confusion related to pain meds throughout the shift  Outcome: Adequate for Discharge     Problem: Skin  Goal: Decreased wound size/increased tissue granulation at next dressing change  Outcome: Adequate for Discharge  Goal: Participates in plan/prevention/treatment measures  Outcome: Adequate for Discharge  Goal: Prevent/manage excess moisture  Outcome: Adequate for Discharge  Goal: Prevent/minimize sheer/friction injuries  Outcome: Adequate for Discharge  Goal: Promote/optimize nutrition  Outcome: Adequate for Discharge  Goal: Promote skin healing  Outcome: Adequate for Discharge

## 2024-11-29 NOTE — DOCUMENTATION CLARIFICATION NOTE
"    PATIENT:               ROSELYN FREIRE  ACCT #:                  [id number]  MRN:                       [id number]  :                       1955  ADMIT DATE:       2024 10:30 AM  DISCH DATE:        2024 4:04 PM  RESPONDING PROVIDER #:        [id number]          PROVIDER RESPONSE TEXT:    UTI related to indwelling Jc Catheter, POA    CDI QUERY TEXT:    Clarification    Instruction:  Based on your assessment of the patient and the clinical information, please provide the requested documentation by clicking on the appropriate radio button and enter any additional information if prompted.    Question: Please further clarify the relationship between the UTI and the urinary device    When answering this query, please exercise your independent professional judgment. The fact that a question is being asked, does not imply that any particular answer is desired or expected.    The patient's clinical indicators include:  Clinical Information:   69 yom ER Adm from SNF after fall found to have UTI.    Clinical Indicators:   Primary Care \"Patient complains of suprapubic/hip pain\"  \"Urinary tract infection\"     -  Ratogi:  \" pt has jc prior to admission\"    LABS:  WBC 15.5, 11.5, 10.5    UA: 500 Leuko   UC: mult org, poss contamination    IMAGING:  CT PELV:  No acute fracture or suspicious osseous lesions.  Mild osteoarthritis of the right hip.  Status post left hip arthroplasty, with  loosening at the proximal femoral stem.  -   BLADDER/PELVIS:   Decompressed with a Jc catheter also obscured by beam hardening.  -   REPRODUCTIVE ORGANS: A penile prosthesis is present, with a reservoir in the right lower iliac fossa.    Nursing notes:   urethral catheter coude 18 Fr    /     Rastpgo:  order to maintain FC    Treatment:   NS IV, Renal consult, cefTRIAXone, 1 g, intravenous, q24h    Risk Factors:  [facility] resident, chronic FC, penile implant  Options provided:  -- UTI related to indwelling Jc " Catheter, POA  -- UTI unrelated to indwelling Moya Catheter, POA  -- Other - I will add my own diagnosis  -- Refer to Clinical Documentation Reviewer    Query created by: Sherice Andrade on 11/25/2024 2:58 PM      Electronically signed by:  GENET HOFF MD 11/29/2024 9:48 AM

## 2025-01-31 ENCOUNTER — APPOINTMENT (OUTPATIENT)
Dept: RADIOLOGY | Facility: HOSPITAL | Age: 70
End: 2025-01-31
Payer: COMMERCIAL

## 2025-01-31 ENCOUNTER — HOSPITAL ENCOUNTER (OUTPATIENT)
Facility: HOSPITAL | Age: 70
Setting detail: OBSERVATION
Discharge: NURSING, REHAB OR LONG-TERM CARE | End: 2025-02-02
Attending: EMERGENCY MEDICINE | Admitting: INTERNAL MEDICINE
Payer: COMMERCIAL

## 2025-01-31 ENCOUNTER — APPOINTMENT (OUTPATIENT)
Dept: CARDIOLOGY | Facility: HOSPITAL | Age: 70
End: 2025-01-31
Payer: COMMERCIAL

## 2025-01-31 DIAGNOSIS — I25.118 ATHEROSCLEROTIC HEART DISEASE OF NATIVE CORONARY ARTERY WITH OTHER FORMS OF ANGINA PECTORIS: ICD-10-CM

## 2025-01-31 DIAGNOSIS — Z79.4 TYPE 2 DIABETES MELLITUS WITH DIABETIC NEUROPATHY, WITH LONG-TERM CURRENT USE OF INSULIN: ICD-10-CM

## 2025-01-31 DIAGNOSIS — E11.40 TYPE 2 DIABETES MELLITUS WITH DIABETIC NEUROPATHY, WITH LONG-TERM CURRENT USE OF INSULIN: ICD-10-CM

## 2025-01-31 DIAGNOSIS — R07.9 CHEST PAIN, UNSPECIFIED TYPE: Primary | ICD-10-CM

## 2025-01-31 DIAGNOSIS — R91.1 LUNG NODULE: ICD-10-CM
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
## 2025-01-31 DIAGNOSIS — F14.10 COCAINE ABUSE, UNCOMPLICATED (MULTI): ICD-10-CM

## 2025-01-31 PROBLEM — K22.4 ESOPHAGEAL DYSMOTILITY: Status: ACTIVE | Noted: 2025-01-31

## 2025-01-31 LAB
ALBUMIN SERPL BCP-MCNC: 3.5 G/DL (ref 3.4–5)
ALP SERPL-CCNC: 74 U/L (ref 33–136)
ALT SERPL W P-5'-P-CCNC: 15 U/L (ref 10–52)
AMPHETAMINES UR QL SCN: ABNORMAL
ANION GAP SERPL CALC-SCNC: 13 MMOL/L (ref 10–20)
AST SERPL W P-5'-P-CCNC: 23 U/L (ref 9–39)
ATRIAL RATE: 99 BPM
BARBITURATES UR QL SCN: ABNORMAL
BASOPHILS # BLD AUTO: 0.03 X10*3/UL (ref 0–0.1)
BASOPHILS NFR BLD AUTO: 0.3 %
BENZODIAZ UR QL SCN: ABNORMAL
BILIRUB SERPL-MCNC: 0.4 MG/DL (ref 0–1.2)
BNP SERPL-MCNC: 49 PG/ML (ref 0–99)
BUN SERPL-MCNC: 10 MG/DL (ref 6–23)
BZE UR QL SCN: ABNORMAL
CALCIUM SERPL-MCNC: 9 MG/DL (ref 8.6–10.3)
CANNABINOIDS UR QL SCN: ABNORMAL
CARDIAC TROPONIN I PNL SERPL HS: 6 NG/L (ref 0–20)
CARDIAC TROPONIN I PNL SERPL HS: 7 NG/L (ref 0–20)
CHLORIDE SERPL-SCNC: 105 MMOL/L (ref 98–107)
CO2 SERPL-SCNC: 22 MMOL/L (ref 21–32)
CREAT SERPL-MCNC: 1.06 MG/DL (ref 0.5–1.3)
D DIMER PPP FEU-MCNC: 2887 NG/ML FEU
EGFRCR SERPLBLD CKD-EPI 2021: 77 ML/MIN/1.73M*2
EOSINOPHIL # BLD AUTO: 0.27 X10*3/UL (ref 0–0.7)
EOSINOPHIL NFR BLD AUTO: 2.4 %
ERYTHROCYTE [DISTWIDTH] IN BLOOD BY AUTOMATED COUNT: 15.6 % (ref 11.5–14.5)
FENTANYL+NORFENTANYL UR QL SCN: ABNORMAL
GLUCOSE BLD MANUAL STRIP-MCNC: 131 MG/DL (ref 74–99)
GLUCOSE SERPL-MCNC: 130 MG/DL (ref 74–99)
HCT VFR BLD AUTO: 43.3 % (ref 41–52)
HGB BLD-MCNC: 13.2 G/DL (ref 13.5–17.5)
IMM GRANULOCYTES # BLD AUTO: 0.04 X10*3/UL (ref 0–0.7)
IMM GRANULOCYTES NFR BLD AUTO: 0.4 % (ref 0–0.9)
INR PPP: 1.3 (ref 0.9–1.1)
LYMPHOCYTES # BLD AUTO: 2.04 X10*3/UL (ref 1.2–4.8)
LYMPHOCYTES NFR BLD AUTO: 18.3 %
MAGNESIUM SERPL-MCNC: 1.89 MG/DL (ref 1.6–2.4)
MCH RBC QN AUTO: 27.7 PG (ref 26–34)
MCHC RBC AUTO-ENTMCNC: 30.5 G/DL (ref 32–36)
MCV RBC AUTO: 91 FL (ref 80–100)
METHADONE UR QL SCN: ABNORMAL
MONOCYTES # BLD AUTO: 0.72 X10*3/UL (ref 0.1–1)
MONOCYTES NFR BLD AUTO: 6.5 %
NEUTROPHILS # BLD AUTO: 8.05 X10*3/UL (ref 1.2–7.7)
NEUTROPHILS NFR BLD AUTO: 72.1 %
NRBC BLD-RTO: 0 /100 WBCS (ref 0–0)
OPIATES UR QL SCN: ABNORMAL
OXYCODONE+OXYMORPHONE UR QL SCN: ABNORMAL
P AXIS: 59 DEGREES
P OFFSET: 194 MS
P ONSET: 141 MS
PCP UR QL SCN: ABNORMAL
PLATELET # BLD AUTO: 315 X10*3/UL (ref 150–450)
POTASSIUM SERPL-SCNC: 3.7 MMOL/L (ref 3.5–5.3)
PR INTERVAL: 170 MS
PROT SERPL-MCNC: 7.5 G/DL (ref 6.4–8.2)
PROTHROMBIN TIME: 14.1 SECONDS (ref 9.8–12.8)
Q ONSET: 226 MS
QRS COUNT: 17 BEATS
QRS DURATION: 76 MS
QT INTERVAL: 330 MS
QTC CALCULATION(BAZETT): 423 MS
QTC FREDERICIA: 389 MS
R AXIS: 33 DEGREES
RBC # BLD AUTO: 4.77 X10*6/UL (ref 4.5–5.9)
SODIUM SERPL-SCNC: 136 MMOL/L (ref 136–145)
T AXIS: 76 DEGREES
T OFFSET: 391 MS
VENTRICULAR RATE: 99 BPM
WBC # BLD AUTO: 11.2 X10*3/UL (ref 4.4–11.3)

## 2025-01-31 PROCEDURE — 2550000001 HC RX 255 CONTRASTS: Performed by: EMERGENCY MEDICINE

## 2025-01-31 PROCEDURE — G0378 HOSPITAL OBSERVATION PER HR: HCPCS

## 2025-01-31 PROCEDURE — 83880 ASSAY OF NATRIURETIC PEPTIDE: CPT

## 2025-01-31 PROCEDURE — 2500000001 HC RX 250 WO HCPCS SELF ADMINISTERED DRUGS (ALT 637 FOR MEDICARE OP): Performed by: NURSE PRACTITIONER

## 2025-01-31 PROCEDURE — 2500000004 HC RX 250 GENERAL PHARMACY W/ HCPCS (ALT 636 FOR OP/ED)

## 2025-01-31 PROCEDURE — 82947 ASSAY GLUCOSE BLOOD QUANT: CPT

## 2025-01-31 PROCEDURE — 73030 X-RAY EXAM OF SHOULDER: CPT | Mod: LT

## 2025-01-31 PROCEDURE — 36415 COLL VENOUS BLD VENIPUNCTURE: CPT | Performed by: EMERGENCY MEDICINE

## 2025-01-31 PROCEDURE — 71275 CT ANGIOGRAPHY CHEST: CPT

## 2025-01-31 PROCEDURE — 73030 X-RAY EXAM OF SHOULDER: CPT | Mod: LEFT SIDE | Performed by: RADIOLOGY

## 2025-01-31 PROCEDURE — 2500000002 HC RX 250 W HCPCS SELF ADMINISTERED DRUGS (ALT 637 FOR MEDICARE OP, ALT 636 FOR OP/ED): Mod: MUE | Performed by: NURSE PRACTITIONER

## 2025-01-31 PROCEDURE — 96361 HYDRATE IV INFUSION ADD-ON: CPT

## 2025-01-31 PROCEDURE — 71275 CT ANGIOGRAPHY CHEST: CPT | Mod: FOREIGN READ | Performed by: RADIOLOGY

## 2025-01-31 PROCEDURE — 99222 1ST HOSP IP/OBS MODERATE 55: CPT | Performed by: NURSE PRACTITIONER

## 2025-01-31 PROCEDURE — 99285 EMERGENCY DEPT VISIT HI MDM: CPT | Performed by: EMERGENCY MEDICINE

## 2025-01-31 PROCEDURE — 99285 EMERGENCY DEPT VISIT HI MDM: CPT | Mod: 25 | Performed by: EMERGENCY MEDICINE

## 2025-01-31 PROCEDURE — 85610 PROTHROMBIN TIME: CPT | Performed by: EMERGENCY MEDICINE

## 2025-01-31 PROCEDURE — 71045 X-RAY EXAM CHEST 1 VIEW: CPT | Mod: FOREIGN READ | Performed by: RADIOLOGY

## 2025-01-31 PROCEDURE — 93005 ELECTROCARDIOGRAM TRACING: CPT

## 2025-01-31 PROCEDURE — 96360 HYDRATION IV INFUSION INIT: CPT

## 2025-01-31 PROCEDURE — 80307 DRUG TEST PRSMV CHEM ANLYZR: CPT | Performed by: NURSE PRACTITIONER

## 2025-01-31 PROCEDURE — 83735 ASSAY OF MAGNESIUM: CPT

## 2025-01-31 PROCEDURE — 84484 ASSAY OF TROPONIN QUANT: CPT | Performed by: EMERGENCY MEDICINE

## 2025-01-31 PROCEDURE — 80053 COMPREHEN METABOLIC PANEL: CPT | Performed by: EMERGENCY MEDICINE

## 2025-01-31 PROCEDURE — 71045 X-RAY EXAM CHEST 1 VIEW: CPT

## 2025-01-31 PROCEDURE — 93010 ELECTROCARDIOGRAM REPORT: CPT | Performed by: EMERGENCY MEDICINE

## 2025-01-31 PROCEDURE — 85025 COMPLETE CBC W/AUTO DIFF WBC: CPT | Performed by: EMERGENCY MEDICINE

## 2025-01-31 PROCEDURE — 85379 FIBRIN DEGRADATION QUANT: CPT

## 2025-01-31 RX ORDER — ASCORBIC ACID 500 MG
500 TABLET ORAL DAILY
COMMUNITY

## 2025-01-31 RX ORDER — NITROGLYCERIN 0.4 MG/1
0.4 TABLET SUBLINGUAL EVERY 5 MIN PRN
Status: DISCONTINUED | OUTPATIENT
Start: 2025-01-31 | End: 2025-02-02 | Stop reason: HOSPADM

## 2025-01-31 RX ORDER — TRIAMCINOLONE ACETONIDE 55 UG/1
2 SPRAY, METERED NASAL EVERY 8 HOURS PRN
COMMUNITY

## 2025-01-31 RX ORDER — PREGABALIN 100 MG/1
200 CAPSULE ORAL 2 TIMES DAILY
Status: DISCONTINUED | OUTPATIENT
Start: 2025-01-31 | End: 2025-02-02 | Stop reason: HOSPADM

## 2025-01-31 RX ORDER — POLYETHYLENE GLYCOL 3350 17 G/17G
17 POWDER, FOR SOLUTION ORAL DAILY PRN
COMMUNITY
End: 2025-02-02 | Stop reason: HOSPADM

## 2025-01-31 RX ORDER — ASPIRIN 81 MG/1
81 TABLET ORAL DAILY
Status: DISCONTINUED | OUTPATIENT
Start: 2025-02-01 | End: 2025-02-02 | Stop reason: HOSPADM

## 2025-01-31 RX ORDER — METHOCARBAMOL 500 MG/1
500 TABLET, FILM COATED ORAL EVERY 8 HOURS SCHEDULED
Status: DISCONTINUED | OUTPATIENT
Start: 2025-01-31 | End: 2025-02-02 | Stop reason: HOSPADM

## 2025-01-31 RX ORDER — ENOXAPARIN SODIUM 100 MG/ML
40 INJECTION SUBCUTANEOUS EVERY 24 HOURS
Status: DISCONTINUED | OUTPATIENT
Start: 2025-01-31 | End: 2025-02-02 | Stop reason: HOSPADM

## 2025-01-31 RX ORDER — CALCIUM CARBONATE 200(500)MG
500 TABLET,CHEWABLE ORAL 4 TIMES DAILY PRN
Status: DISCONTINUED | OUTPATIENT
Start: 2025-01-31 | End: 2025-02-02 | Stop reason: HOSPADM

## 2025-01-31 RX ORDER — ACETAMINOPHEN 325 MG/1
650 TABLET ORAL EVERY 4 HOURS PRN
Status: DISCONTINUED | OUTPATIENT
Start: 2025-01-31 | End: 2025-02-02 | Stop reason: HOSPADM

## 2025-01-31 RX ORDER — DULOXETIN HYDROCHLORIDE 60 MG/1
60 CAPSULE, DELAYED RELEASE ORAL DAILY
Status: DISCONTINUED | OUTPATIENT
Start: 2025-02-01 | End: 2025-02-02 | Stop reason: HOSPADM

## 2025-01-31 RX ORDER — LIRAGLUTIDE 6 MG/ML
1.8 INJECTION SUBCUTANEOUS DAILY
COMMUNITY

## 2025-01-31 RX ORDER — INSULIN LISPRO 100 [IU]/ML
6 INJECTION, SOLUTION INTRAVENOUS; SUBCUTANEOUS
Status: DISCONTINUED | OUTPATIENT
Start: 2025-02-01 | End: 2025-02-02 | Stop reason: HOSPADM

## 2025-01-31 RX ORDER — INSULIN GLARGINE 100 [IU]/ML
44 INJECTION, SOLUTION SUBCUTANEOUS NIGHTLY
Status: DISCONTINUED | OUTPATIENT
Start: 2025-01-31 | End: 2025-02-02 | Stop reason: HOSPADM

## 2025-01-31 RX ORDER — ATORVASTATIN CALCIUM 80 MG/1
80 TABLET, FILM COATED ORAL DAILY
Status: DISCONTINUED | OUTPATIENT
Start: 2025-02-01 | End: 2025-02-02 | Stop reason: HOSPADM

## 2025-01-31 RX ORDER — ALBUTEROL SULFATE 90 UG/1
2 INHALANT RESPIRATORY (INHALATION) EVERY 4 HOURS PRN
Status: DISCONTINUED | OUTPATIENT
Start: 2025-01-31 | End: 2025-01-31 | Stop reason: CLARIF

## 2025-01-31 RX ORDER — AMOXICILLIN AND CLAVULANATE POTASSIUM 500; 125 MG/1; MG/1
1 TABLET, FILM COATED ORAL 2 TIMES DAILY
Status: ON HOLD | COMMUNITY
Start: 2025-01-24 | End: 2025-01-31 | Stop reason: ENTERED-IN-ERROR

## 2025-01-31 RX ORDER — OXYCODONE HYDROCHLORIDE 5 MG/1
5 TABLET ORAL EVERY 8 HOURS PRN
COMMUNITY

## 2025-01-31 RX ORDER — OXYCODONE HYDROCHLORIDE 5 MG/1
5 TABLET ORAL EVERY 8 HOURS PRN
Status: DISCONTINUED | OUTPATIENT
Start: 2025-01-31 | End: 2025-02-02 | Stop reason: HOSPADM

## 2025-01-31 RX ORDER — ACETAMINOPHEN 160 MG/5ML
650 SOLUTION ORAL EVERY 4 HOURS PRN
Status: DISCONTINUED | OUTPATIENT
Start: 2025-01-31 | End: 2025-02-02 | Stop reason: HOSPADM

## 2025-01-31 RX ORDER — TRAZODONE HYDROCHLORIDE 50 MG/1
50 TABLET ORAL NIGHTLY
Status: DISCONTINUED | OUTPATIENT
Start: 2025-01-31 | End: 2025-02-02 | Stop reason: HOSPADM

## 2025-01-31 RX ORDER — TAMSULOSIN HYDROCHLORIDE 0.4 MG/1
0.4 CAPSULE ORAL
Status: DISCONTINUED | OUTPATIENT
Start: 2025-02-01 | End: 2025-02-02 | Stop reason: HOSPADM

## 2025-01-31 RX ORDER — POLYETHYLENE GLYCOL 3350 17 G/17G
17 POWDER, FOR SOLUTION ORAL DAILY PRN
Status: DISCONTINUED | OUTPATIENT
Start: 2025-01-31 | End: 2025-02-02 | Stop reason: HOSPADM

## 2025-01-31 RX ORDER — METOPROLOL TARTRATE 25 MG/1
12.5 TABLET, FILM COATED ORAL 2 TIMES DAILY
Status: DISCONTINUED | OUTPATIENT
Start: 2025-01-31 | End: 2025-02-01

## 2025-01-31 RX ORDER — METOPROLOL TARTRATE 25 MG/1
12.5 TABLET, FILM COATED ORAL 2 TIMES DAILY
COMMUNITY
End: 2025-02-02 | Stop reason: HOSPADM

## 2025-01-31 RX ORDER — QUETIAPINE FUMARATE 25 MG/1
25 TABLET, FILM COATED ORAL NIGHTLY
Status: DISCONTINUED | OUTPATIENT
Start: 2025-01-31 | End: 2025-02-02 | Stop reason: HOSPADM

## 2025-01-31 RX ORDER — CETIRIZINE HYDROCHLORIDE 10 MG/1
10 TABLET ORAL DAILY
Status: DISCONTINUED | OUTPATIENT
Start: 2025-01-31 | End: 2025-02-02 | Stop reason: HOSPADM

## 2025-01-31 RX ORDER — ACETAMINOPHEN 650 MG/1
650 SUPPOSITORY RECTAL EVERY 4 HOURS PRN
Status: DISCONTINUED | OUTPATIENT
Start: 2025-01-31 | End: 2025-02-02 | Stop reason: HOSPADM

## 2025-01-31 RX ORDER — LURASIDONE HYDROCHLORIDE 40 MG/1
80 TABLET, FILM COATED ORAL NIGHTLY
Status: DISCONTINUED | OUTPATIENT
Start: 2025-01-31 | End: 2025-02-02 | Stop reason: HOSPADM

## 2025-01-31 RX ORDER — ALBUTEROL SULFATE 0.83 MG/ML
2.5 SOLUTION RESPIRATORY (INHALATION) EVERY 4 HOURS PRN
Status: DISCONTINUED | OUTPATIENT
Start: 2025-01-31 | End: 2025-02-02 | Stop reason: HOSPADM

## 2025-01-31 RX ORDER — PANTOPRAZOLE SODIUM 40 MG/1
40 TABLET, DELAYED RELEASE ORAL
Status: DISCONTINUED | OUTPATIENT
Start: 2025-02-01 | End: 2025-02-02 | Stop reason: HOSPADM

## 2025-01-31 RX ORDER — LORATADINE 10 MG/1
10 TABLET ORAL DAILY PRN
COMMUNITY

## 2025-01-31 RX ORDER — BENZOCAINE AND MENTHOL 15; 3.6 MG/1; MG/1
1 LOZENGE ORAL EVERY 4 HOURS PRN
COMMUNITY

## 2025-01-31 RX ORDER — LITHIUM CARBONATE 300 MG/1
300 TABLET, FILM COATED, EXTENDED RELEASE ORAL 2 TIMES DAILY
Status: DISCONTINUED | OUTPATIENT
Start: 2025-01-31 | End: 2025-02-02 | Stop reason: HOSPADM

## 2025-01-31 RX ORDER — SENNOSIDES 8.6 MG/1
2 TABLET ORAL NIGHTLY PRN
Status: DISCONTINUED | OUTPATIENT
Start: 2025-01-31 | End: 2025-02-02 | Stop reason: HOSPADM

## 2025-01-31 RX ORDER — ALBUTEROL SULFATE 90 UG/1
2 INHALANT RESPIRATORY (INHALATION) EVERY 4 HOURS PRN
COMMUNITY

## 2025-01-31 RX ORDER — TRIHEXYPHENIDYL HYDROCHLORIDE 2 MG/1
2 TABLET ORAL
Status: DISCONTINUED | OUTPATIENT
Start: 2025-02-01 | End: 2025-02-02 | Stop reason: HOSPADM

## 2025-01-31 RX ORDER — ISOSORBIDE MONONITRATE 60 MG/1
60 TABLET, EXTENDED RELEASE ORAL DAILY
Status: DISCONTINUED | OUTPATIENT
Start: 2025-02-01 | End: 2025-02-02 | Stop reason: HOSPADM

## 2025-01-31 RX ORDER — LURASIDONE HYDROCHLORIDE 80 MG/1
80 TABLET, FILM COATED ORAL NIGHTLY
COMMUNITY

## 2025-01-31 RX ADMIN — METOPROLOL TARTRATE 12.5 MG: 25 TABLET, FILM COATED ORAL at 21:32

## 2025-01-31 RX ADMIN — METHOCARBAMOL 500 MG: 500 TABLET ORAL at 21:32

## 2025-01-31 RX ADMIN — LURASIDONE HYDROCHLORIDE 80 MG: 40 TABLET, FILM COATED ORAL at 21:33

## 2025-01-31 RX ADMIN — QUETIAPINE FUMARATE 25 MG: 25 TABLET ORAL at 21:32

## 2025-01-31 RX ADMIN — IOHEXOL 60 ML: 350 INJECTION, SOLUTION INTRAVENOUS at 14:05

## 2025-01-31 RX ADMIN — INSULIN GLARGINE 44 UNITS: 100 INJECTION, SOLUTION SUBCUTANEOUS at 21:52

## 2025-01-31 RX ADMIN — SODIUM CHLORIDE 1000 ML: 9 INJECTION, SOLUTION INTRAVENOUS at 11:17

## 2025-01-31 RX ADMIN — LITHIUM CARBONATE 300 MG: 300 TABLET, EXTENDED RELEASE ORAL at 21:45

## 2025-01-31 RX ADMIN — PREGABALIN 200 MG: 100 CAPSULE ORAL at 21:31

## 2025-01-31 RX ADMIN — TRAZODONE HYDROCHLORIDE 50 MG: 50 TABLET ORAL at 21:31

## 2025-01-31 SDOH — ECONOMIC STABILITY: INCOME INSECURITY: IN THE PAST 12 MONTHS HAS THE ELECTRIC, GAS, OIL, OR WATER COMPANY THREATENED TO SHUT OFF SERVICES IN YOUR HOME?: NO

## 2025-01-31 SDOH — SOCIAL STABILITY: SOCIAL INSECURITY: HAS ANYONE EVER THREATENED TO HURT YOUR FAMILY OR YOUR PETS?: NO

## 2025-01-31 SDOH — SOCIAL STABILITY: SOCIAL INSECURITY: DOES ANYONE TRY TO KEEP YOU FROM HAVING/CONTACTING OTHER FRIENDS OR DOING THINGS OUTSIDE YOUR HOME?: NO

## 2025-01-31 SDOH — SOCIAL STABILITY: SOCIAL INSECURITY: WITHIN THE LAST YEAR, HAVE YOU BEEN HUMILIATED OR EMOTIONALLY ABUSED IN OTHER WAYS BY YOUR PARTNER OR EX-PARTNER?: NO

## 2025-01-31 SDOH — SOCIAL STABILITY: SOCIAL INSECURITY: ARE YOU OR HAVE YOU BEEN THREATENED OR ABUSED PHYSICALLY, EMOTIONALLY, OR SEXUALLY BY ANYONE?: NO

## 2025-01-31 SDOH — ECONOMIC STABILITY: FOOD INSECURITY
WITHIN THE PAST 12 MONTHS, YOU WORRIED THAT YOUR FOOD WOULD RUN OUT BEFORE YOU GOT THE MONEY TO BUY MORE.: PATIENT DECLINED

## 2025-01-31 SDOH — SOCIAL STABILITY: SOCIAL INSECURITY: WITHIN THE LAST YEAR, HAVE YOU BEEN AFRAID OF YOUR PARTNER OR EX-PARTNER?: NO

## 2025-01-31 SDOH — SOCIAL STABILITY: SOCIAL INSECURITY: WERE YOU ABLE TO COMPLETE ALL THE BEHAVIORAL HEALTH SCREENINGS?: YES

## 2025-01-31 SDOH — ECONOMIC STABILITY: FOOD INSECURITY: WITHIN THE PAST 12 MONTHS, THE FOOD YOU BOUGHT JUST DIDN'T LAST AND YOU DIDN'T HAVE MONEY TO GET MORE.: PATIENT DECLINED

## 2025-01-31 SDOH — SOCIAL STABILITY: SOCIAL INSECURITY: HAVE YOU HAD THOUGHTS OF HARMING ANYONE ELSE?: NO

## 2025-01-31 SDOH — SOCIAL STABILITY: SOCIAL INSECURITY: ARE THERE ANY APPARENT SIGNS OF INJURIES/BEHAVIORS THAT COULD BE RELATED TO ABUSE/NEGLECT?: NO

## 2025-01-31 SDOH — SOCIAL STABILITY: SOCIAL INSECURITY: DO YOU FEEL UNSAFE GOING BACK TO THE PLACE WHERE YOU ARE LIVING?: NO

## 2025-01-31 SDOH — SOCIAL STABILITY: SOCIAL INSECURITY: HAVE YOU HAD ANY THOUGHTS OF HARMING ANYONE ELSE?: NO

## 2025-01-31 SDOH — SOCIAL STABILITY: SOCIAL INSECURITY: ABUSE: ADULT

## 2025-01-31 SDOH — SOCIAL STABILITY: SOCIAL INSECURITY: DO YOU FEEL ANYONE HAS EXPLOITED OR TAKEN ADVANTAGE OF YOU FINANCIALLY OR OF YOUR PERSONAL PROPERTY?: NO

## 2025-01-31 ASSESSMENT — LIFESTYLE VARIABLES
HAVE PEOPLE ANNOYED YOU BY CRITICIZING YOUR DRINKING: NO
HOW MANY STANDARD DRINKS CONTAINING ALCOHOL DO YOU HAVE ON A TYPICAL DAY: PATIENT DOES NOT DRINK
SKIP TO QUESTIONS 9-10: 1
HOW OFTEN DO YOU HAVE A DRINK CONTAINING ALCOHOL: NEVER
EVER FELT BAD OR GUILTY ABOUT YOUR DRINKING: NO
HOW OFTEN DO YOU HAVE 6 OR MORE DRINKS ON ONE OCCASION: NEVER
AUDIT-C TOTAL SCORE: 0
TOTAL SCORE: 0
EVER HAD A DRINK FIRST THING IN THE MORNING TO STEADY YOUR NERVES TO GET RID OF A HANGOVER: NO
HAVE YOU EVER FELT YOU SHOULD CUT DOWN ON YOUR DRINKING: NO
AUDIT-C TOTAL SCORE: 0

## 2025-01-31 ASSESSMENT — ENCOUNTER SYMPTOMS
FREQUENCY: 0
SHORTNESS OF BREATH: 0
FEVER: 0
PALPITATIONS: 0
DIARRHEA: 0
WHEEZING: 0
SEIZURES: 0
NAUSEA: 1
ABDOMINAL PAIN: 0
DYSURIA: 0
COUGH: 1
NECK PAIN: 0
WEAKNESS: 0
VOMITING: 0
ABDOMINAL DISTENTION: 0
DIZZINESS: 1
LIGHT-HEADEDNESS: 0
CHEST TIGHTNESS: 1
JOINT SWELLING: 0
CHILLS: 0
BLOOD IN STOOL: 0

## 2025-01-31 ASSESSMENT — COGNITIVE AND FUNCTIONAL STATUS - GENERAL
STANDING UP FROM CHAIR USING ARMS: A LITTLE
DRESSING REGULAR LOWER BODY CLOTHING: A LITTLE
TOILETING: A LITTLE
DRESSING REGULAR LOWER BODY CLOTHING: A LITTLE
CLIMB 3 TO 5 STEPS WITH RAILING: A LITTLE
MOVING TO AND FROM BED TO CHAIR: A LITTLE
HELP NEEDED FOR BATHING: A LITTLE
WALKING IN HOSPITAL ROOM: A LITTLE
CLIMB 3 TO 5 STEPS WITH RAILING: A LITTLE
STANDING UP FROM CHAIR USING ARMS: A LITTLE
DRESSING REGULAR UPPER BODY CLOTHING: A LITTLE
HELP NEEDED FOR BATHING: A LITTLE
PERSONAL GROOMING: A LITTLE
TURNING FROM BACK TO SIDE WHILE IN FLAT BAD: A LITTLE
WALKING IN HOSPITAL ROOM: A LITTLE
MOVING FROM LYING ON BACK TO SITTING ON SIDE OF FLAT BED WITH BEDRAILS: A LITTLE
DAILY ACTIVITIY SCORE: 18
MOBILITY SCORE: 18
MOVING TO AND FROM BED TO CHAIR: A LITTLE
TURNING FROM BACK TO SIDE WHILE IN FLAT BAD: A LITTLE
MOVING FROM LYING ON BACK TO SITTING ON SIDE OF FLAT BED WITH BEDRAILS: A LITTLE
DRESSING REGULAR UPPER BODY CLOTHING: A LITTLE
TOILETING: A LITTLE
EATING MEALS: A LITTLE
EATING MEALS: A LITTLE
PATIENT BASELINE BEDBOUND: NO
PERSONAL GROOMING: A LITTLE
DAILY ACTIVITIY SCORE: 18
MOBILITY SCORE: 18

## 2025-01-31 ASSESSMENT — PAIN SCALES - GENERAL
PAINLEVEL_OUTOF10: 7
PAINLEVEL_OUTOF10: 7

## 2025-01-31 ASSESSMENT — COLUMBIA-SUICIDE SEVERITY RATING SCALE - C-SSRS
2. HAVE YOU ACTUALLY HAD ANY THOUGHTS OF KILLING YOURSELF?: NO
1. IN THE PAST MONTH, HAVE YOU WISHED YOU WERE DEAD OR WISHED YOU COULD GO TO SLEEP AND NOT WAKE UP?: NO
6. HAVE YOU EVER DONE ANYTHING, STARTED TO DO ANYTHING, OR PREPARED TO DO ANYTHING TO END YOUR LIFE?: NO

## 2025-01-31 ASSESSMENT — ACTIVITIES OF DAILY LIVING (ADL)
ADEQUATE_TO_COMPLETE_ADL: YES
PATIENT'S MEMORY ADEQUATE TO SAFELY COMPLETE DAILY ACTIVITIES?: YES
FEEDING YOURSELF: NEEDS ASSISTANCE
TOILETING: NEEDS ASSISTANCE
WALKS IN HOME: NEEDS ASSISTANCE
HEARING - LEFT EAR: FUNCTIONAL
JUDGMENT_ADEQUATE_SAFELY_COMPLETE_DAILY_ACTIVITIES: YES
HEARING - RIGHT EAR: FUNCTIONAL
BATHING: NEEDS ASSISTANCE
GROOMING: NEEDS ASSISTANCE
ASSISTIVE_DEVICE: WALKER;EYEGLASSES
LACK_OF_TRANSPORTATION: PATIENT DECLINED
DRESSING YOURSELF: NEEDS ASSISTANCE

## 2025-01-31 ASSESSMENT — PAIN - FUNCTIONAL ASSESSMENT: PAIN_FUNCTIONAL_ASSESSMENT: 0-10

## 2025-01-31 ASSESSMENT — HEART SCORE
AGE: 65+
TROPONIN: LESS THAN OR EQUAL TO NORMAL LIMIT
HEART SCORE: 5
RISK FACTORS: >2 RISK FACTORS OR HX OF ATHEROSCLEROTIC DISEASE
ECG: NORMAL
HISTORY: MODERATELY SUSPICIOUS

## 2025-01-31 ASSESSMENT — PAIN DESCRIPTION - DESCRIPTORS
DESCRIPTORS: HEAVINESS
DESCRIPTORS: HEAVINESS

## 2025-01-31 ASSESSMENT — PAIN DESCRIPTION - FREQUENCY: FREQUENCY: CONSTANT/CONTINUOUS

## 2025-01-31 ASSESSMENT — PAIN DESCRIPTION - PROGRESSION: CLINICAL_PROGRESSION: NOT CHANGED

## 2025-01-31 NOTE — ASSESSMENT & PLAN NOTE
-Monitor on telemetry q4 hour vitals  -Cards consult, defer to cards regarding need for echo  -Troponins x 2 negative  -EKG normal sinus rhythm.  No ST elevations or depressions  -Chest pain is likely in the setting of recently recovered COVID-19 infection.  Chest pain is reproducible to chest wall pressure not likely cardiac in nature  -home medications to be resumed when home med rec competed

## 2025-01-31 NOTE — ED PROVIDER NOTES
EMERGENCY DEPARTMENT ENCOUNTER      Pt Name: Brannon Engel  MRN: [id number]  Birthdate 1955  Date of evaluation: 1/31/2025  Provider: Radames Vigil MD    CHIEF COMPLAINT       Chief Complaint   Patient presents with    Chest Pain     Started yesterday, NH gave x3 NTG yesterday and x3 today         HISTORY OF PRESENT ILLNESS    HPI  Patient is 69-year-old male with a history of DM2, prior cocaine use, CAD, HTN, HLD, BPH with chronic indwelling Moya, PAD, CKD, schizophrenia, DVT not on blood thinners presenting with chest pain.  Is been ongoing for the past 2 days.  It is constant.  Sharp, substernal, nonradiating, without consistent alleviating or exacerbating factors.  He had some shortness of breath this morning but not now.  He otherwise denies fever, sweats, chills, lightheadedness, runny nose, congestion, sore throat, cough, abdominal pain, nausea, vomiting, diarrhea, constipation, dark or bloody stools, dysuria, hematuria.    Nursing Notes were reviewed.    PAST MEDICAL HISTORY     Past Medical History:   Diagnosis Date    Dysphagia     Emphysema lung (Multi)     GERD (gastroesophageal reflux disease)     HLD (hyperlipidemia)     Hypertension, essential     Low back pain     PVD (peripheral vascular disease) ([facility])     Schizophrenia     Type 2 diabetes mellitus          SURGICAL HISTORY     History reviewed. No pertinent surgical history.      CURRENT MEDICATIONS       Previous Medications    ACETAMINOPHEN (TYLENOL) 325 MG TABLET    Take 2 tablets (650 mg) by mouth every 4 hours if needed for mild pain (1 - 3).    AMOXICILLIN-POT CLAVULANATE (AUGMENTIN) 500-125 MG TABLET    Take 1 tablet by mouth 2 times a day.    ASPIRIN 81 MG EC TABLET    Take 1 tablet (81 mg) by mouth once daily.    ATORVASTATIN (LIPITOR) 80 MG TABLET    Take 1 tablet (80 mg) by mouth once daily.    BENZOCAINE-MENTHOL (CEPACOL SORE THROAT, SAL-MEN,) LOZENGE    Dissolve 1 lozenge in the mouth every 4 hours if needed for sore throat.     CALCIUM CARBONATE-VITAMIN D3 500 MG-5 MCG (200 UNIT) TABLET    Take 1 tablet by mouth 2 times a day.    CHOLECALCIFEROL (VITAMIN D3) 25 MCG (1000 UT) TABLET    Take 1 tablet (1,000 Units) by mouth once daily.    CYANOCOBALAMIN (VITAMIN B-12) 1,000 MCG TABLET    Take 1 tablet (1,000 mcg) by mouth once daily.    DICLOFENAC SODIUM (VOLTAREN ARTHRITIS PAIN) 1 % GEL    Apply 4.5 inches (4 g) topically every 2 hours if needed.    DOCUSATE SODIUM (COLACE) 100 MG CAPSULE    Take 1 capsule (100 mg) by mouth 2 times a day.    DULOXETINE (CYMBALTA) 60 MG DR CAPSULE    Take 1 capsule (60 mg) by mouth once daily. Do not crush or chew.    ESOMEPRAZOLE (NEXIUM) 40 MG DR CAPSULE    Take 1 capsule (40 mg) by mouth 2 times a day. Do not open capsule.    FERROUS SULFATE 325 (65 FE) MG EC TABLET    Take 65 mg by mouth once daily. M,W,F  Do not crush, chew, or split.    FLUTICASONE (FLONASE) 50 MCG/ACTUATION NASAL SPRAY    Administer 1 spray into each nostril once daily. Shake gently. Before first use, prime pump. After use, clean tip and replace cap.    GLUCOSE (GLUTOSE) 40 % GEL ORAL GEL    Take 15 g by mouth if needed for low blood sugar - see comments.    HYDROCORTISONE (ANUSOL-HC) 25 MG SUPPOSITORY    Insert 1 suppository (25 mg) into the rectum 2 times a day as needed for hemorrhoids.    INSULIN GLARGINE (LANTUS U-100 INSULIN) 100 UNIT/ML INJECTION    Inject 44 Units under the skin once daily at bedtime. Take as directed per insulin instructions.    INSULIN LISPRO 100 UNIT/ML INJECTION    Inject 6 Units under the skin 3 times daily (morning, midday, late afternoon). Take as directed per insulin instructions.    ISOSORBIDE MONONITRATE ER (IMDUR) 60 MG 24 HR TABLET    Take 1 tablet (60 mg) by mouth once daily. Do not crush or chew.    LIDOCAINE 4 % PATCH    Place 1 patch over 12 hours on the skin once daily. Remove & discard patch within 12 hours or as directed by MD.    LIDOCAINE 4 % PATCH    Place 1 patch over 12 hours on the  skin once daily. Remove & discard patch within 12 hours or as directed by MD.    LITHIUM ER (LITHOBID) 300 MG 12 HR TABLET    Take 1 tablet (300 mg) by mouth twice a day.    LORATADINE (CLARITIN) 10 MG TABLET    Take 1 tablet (10 mg) by mouth once daily as needed for allergies.    METHOCARBAMOL (ROBAXIN) 500 MG TABLET    Take 1 tablet (500 mg) by mouth every 8 hours.    MULTIVITAMIN TABLET    Take 1 tablet by mouth once daily.    NITROGLYCERIN (NITROSTAT) 0.4 MG SL TABLET    Place 1 tablet (0.4 mg) under the tongue every 5 minutes if needed for chest pain.    POLYETHYLENE GLYCOL (GLYCOLAX, MIRALAX) 17 GRAM PACKET    Take 17 g by mouth once daily.    POLYETHYLENE GLYCOL (GLYCOLAX, MIRALAX) 17 GRAM PACKET    Take 17 g by mouth once daily as needed (constipation).    PREGABALIN (LYRICA) 200 MG CAPSULE    Take 1 capsule (200 mg) by mouth 2 times a day.    QUETIAPINE (SEROQUEL) 25 MG TABLET    Take 1 tablet daily at bedtimes    SEMAGLUTIDE (OZEMPIC) 0.25 MG OR 0.5 MG(2 MG/1.5 ML) PEN INJECTOR    Inject 0.25 mg under the skin 1 (one) time per week. saturday    TAMSULOSIN (FLOMAX) 0.4 MG 24 HR CAPSULE    Take 1 capsule (0.4 mg) by mouth once daily.    TRAZODONE (DESYREL) 50 MG TABLET    Take 1 tablet (50 mg) by mouth once daily at bedtime.    TRIHEXYPHENIDYL (ARTANE) 2 MG TABLET    Take 1 tablet (2 mg) by mouth twice a day.    VARENICLINE (CHANTIX) 1 MG TABLET    Take 1 tablet (1 mg) by mouth 2 times a day. Take with full glass of water.       ALLERGIES     Ace inhibitors    FAMILY HISTORY       Family History   Problem Relation Name Age of Onset    No Known Problems Mother      No Known Problems Father            SOCIAL HISTORY       Social History     Socioeconomic History    Marital status: Single   Tobacco Use    Smoking status: Former     Types: Cigarettes    Smokeless tobacco: Never   Substance and Sexual Activity    Sexual activity: Defer     Social Drivers of Health     Financial Resource Strain: Patient Unable To  Answer (11/7/2024)    Overall Financial Resource Strain (CARDIA)     Difficulty of Paying Living Expenses: Patient unable to answer   Food Insecurity: Patient Unable To Answer (11/7/2024)    Hunger Vital Sign     Worried About Running Out of Food in the Last Year: Patient unable to answer     Ran Out of Food in the Last Year: Patient unable to answer   Transportation Needs: Patient Unable To Answer (11/7/2024)    PRAPARE - Transportation     Lack of Transportation (Medical): Patient unable to answer     Lack of Transportation (Non-Medical): Patient unable to answer   Physical Activity: Inactive (11/7/2024)    Exercise Vital Sign     Days of Exercise per Week: 0 days     Minutes of Exercise per Session: 0 min   Intimate Partner Violence: Not At Risk (11/7/2024)    Humiliation, Afraid, Rape, and Kick questionnaire     Fear of Current or Ex-Partner: No     Emotionally Abused: No     Physically Abused: No     Sexually Abused: No   Housing Stability: Patient Unable To Answer (11/7/2024)    Housing Stability Vital Sign     Unable to Pay for Housing in the Last Year: Patient unable to answer     Number of Times Moved in the Last Year: 1     Homeless in the Last Year: Patient unable to answer       SCREENINGS                        PHYSICAL EXAM    (up to 7 for level 4, 8 or more for level 5)     ED Triage Vitals   Temperature Heart Rate Respirations BP   01/31/25 1046 01/31/25 1030 01/31/25 1030 01/31/25 1030   36 °C (96.8 °F) 100 20 104/68      Pulse Ox Temp Source Heart Rate Source Patient Position   01/31/25 1030 01/31/25 1046 -- 01/31/25 1030   96 % Temporal  Sitting      BP Location FiO2 (%)     01/31/25 1030 --     Right arm        Physical Exam  Constitutional:       General: He is not in acute distress.     Appearance: He is not ill-appearing.   HENT:      Head: Normocephalic and atraumatic.   Eyes:      Extraocular Movements: Extraocular movements intact.      Pupils: Pupils are equal, round, and reactive to  light.   Cardiovascular:      Rate and Rhythm: Regular rhythm. Tachycardia present.      Heart sounds: Normal heart sounds.   Pulmonary:      Effort: Pulmonary effort is normal. No respiratory distress.      Breath sounds: Normal breath sounds.   Abdominal:      Palpations: Abdomen is soft.      Tenderness: There is no abdominal tenderness.   Musculoskeletal:      Cervical back: Normal range of motion and neck supple.      Right lower leg: No tenderness. No edema.      Left lower leg: No tenderness. No edema.   Skin:     General: Skin is warm and dry.      Capillary Refill: Capillary refill takes less than 2 seconds.   Neurological:      General: No focal deficit present.          DIAGNOSTIC RESULTS     LABS:  Labs Reviewed   CBC WITH AUTO DIFFERENTIAL - Abnormal       Result Value    WBC 11.2      nRBC 0.0      RBC 4.77      Hemoglobin 13.2 (*)     Hematocrit 43.3      MCV 91      MCH 27.7      MCHC 30.5 (*)     RDW 15.6 (*)     Platelets 315      Neutrophils % 72.1      Immature Granulocytes %, Automated 0.4      Lymphocytes % 18.3      Monocytes % 6.5      Eosinophils % 2.4      Basophils % 0.3      Neutrophils Absolute 8.05 (*)     Immature Granulocytes Absolute, Automated 0.04      Lymphocytes Absolute 2.04      Monocytes Absolute 0.72      Eosinophils Absolute 0.27      Basophils Absolute 0.03     COMPREHENSIVE METABOLIC PANEL - Abnormal    Glucose 130 (*)     Sodium 136      Potassium 3.7      Chloride 105      Bicarbonate 22      Anion Gap 13      Urea Nitrogen 10      Creatinine 1.06      eGFR 77      Calcium 9.0      Albumin 3.5      Alkaline Phosphatase 74      Total Protein 7.5      AST 23      Bilirubin, Total 0.4      ALT 15     PROTIME-INR - Abnormal    Protime 14.1 (*)     INR 1.3 (*)    D-DIMER, VTE EXCLUSION - Abnormal    D-Dimer, Quantitative VTE Exclusion 2,887 (*)     Narrative:     The VTE Exclusion D-Dimer assay is reported in ng/mL Fibrinogen Equivalent Units (FEU).    Per 's  instructions for use, a value of less than 500 ng/mL (FEU) may help to exclude DVT or PE in outpatients when the assay is used with a clinical pretest probability assessment.([facility] must utilize and document eCalc 'Wells Score Deep Vein Thrombosis Risk' for DVT exclusion only. Emergency Department should utilize  Guidelines for Emergency Department Use of the VTE Exclusion D-Dimer and Clinical Pretest probability assessment model for DVT or PE exclusion.)   SERIAL TROPONIN-INITIAL - Normal    Troponin I, High Sensitivity 7      Narrative:     Less than 99th percentile of normal range cutoff-  Female and children under 18 years old <14 ng/L; Male <21 ng/L: Negative  Repeat testing should be performed if clinically indicated.     Female and children under 18 years old 14-50 ng/L; Male 21-50 ng/L:  Consistent with possible cardiac damage and possible increased clinical   risk. Serial measurements may help to assess extent of myocardial damage.     >50 ng/L: Consistent with cardiac damage, increased clinical risk and  myocardial infarction. Serial measurements may help assess extent of   myocardial damage.      NOTE: Children less than 1 year old may have higher baseline troponin   levels and results should be interpreted in conjunction with the overall   clinical context.     NOTE: Troponin I testing is performed using a different   testing methodology at [facility] than at other   [facility]. Direct result comparisons should only   be made within the same method.   SERIAL TROPONIN, 1 HOUR - Normal    Troponin I, High Sensitivity 6      Narrative:     Less than 99th percentile of normal range cutoff-  Female and children under 18 years old <14 ng/L; Male <21 ng/L: Negative  Repeat testing should be performed if clinically indicated.     Female and children under 18 years old 14-50 ng/L; Male 21-50 ng/L:  Consistent with possible cardiac damage and possible increased clinical   risk. Serial  measurements may help to assess extent of myocardial damage.     >50 ng/L: Consistent with cardiac damage, increased clinical risk and  myocardial infarction. Serial measurements may help assess extent of   myocardial damage.      NOTE: Children less than 1 year old may have higher baseline troponin   levels and results should be interpreted in conjunction with the overall   clinical context.     NOTE: Troponin I testing is performed using a different   testing methodology at [facility] than at other   [facility]. Direct result comparisons should only   be made within the same method.   MAGNESIUM - Normal    Magnesium 1.89     B-TYPE NATRIURETIC PEPTIDE - Normal    BNP 49      Narrative:        <100 pg/mL - Heart failure unlikely  100-299 pg/mL - Intermediate probability of acute heart                  failure exacerbation. Correlate with clinical                  context and patient history.    >=300 pg/mL - Heart Failure likely. Correlate with clinical                  context and patient history.    BNP testing is performed using different testing methodology at [facility] than at other [facility] hospitals. Direct result comparisons should only be made within the same method.      TROPONIN SERIES- (INITIAL, 1 HR)    Narrative:     The following orders were created for panel order Troponin I Series, High Sensitivity (0, 1 HR).  Procedure                               Abnormality         Status                     ---------                               -----------         ------                     Troponin I, High Sensiti...[[id number]]  Normal              Final result               Troponin, High Sensitivi...[[id number]]  Normal              Final result                 Please view results for these tests on the individual orders.       All other labs were within normal range or not returned as of this dictation.    Imaging  CT angio chest for pulmonary embolism   Final Result   1.  No acute or chronic pulmonary emboli.   2. No thoracic aortic aneurysm or dissection.   3. Mild to moderate emphysema within the upper lung fields.   4. Subpleural nodules of 0.6 cm within the right upper lobe and right   middle lobe.  See recommendations below.   5. Segmental atelectasis involving both posterior lung bases.   6. Nodule of 0.7 cm seen within the right costophrenic sulcus.   Fleischner Society 2017 Guidelines for Management of Incidentally   Detected Pulmonary Nodules in Adults:   A. SOLID NODULES*   Nodule Type and Size:   Single:   · Low Risk**    < 6 mm - No routine follow-up   6-8 mm - CT at 6-12 months, then consider CT at 18-24 months   > 8 mm - Consider CT at 3 months, PET/CT, or tissue sampling   · High Risk**   < 6 mm - Optional CT at 12 months   6-8 mm - CT at 6-12 months, then CT at 18-24 months   > 8 mm - Consider CT at 3 months, PET/CT, or tissue sampling   Multiple:   · Low Risk**    < 6 mm - No routine follow-up   6-8 mm - CT at 3-6 months, then consider CT at 18-24 months   > 8 mm - CT at 3-6 months, then consider CT at 18-24 months   · High Risk**   < 6 mm - Optional CT at 12 months   6-8 mm - CT at 3-6 months, then at 18-24 months   > 8 mm - CT at 3-6 months, then at 18-24 months   B. SUBSOLID NODULES*   Nodule Type and Size:   Single:    · Ground glass   < 6 mm - No routine follow-up   > 6 mm - CT at 6-12 months to confirm persistence, then CT every 2   years until 5 years   · Part Solid   < 6 mm - No routine follow-up   > 6 mm - CT at 3-6 months to confirm persistence. If unchanged and   solid component remains < 6 mm, annual CT should be performed for 5   years.   Multiple:   < 6 mm - CT at 3-6 months. If stable, consider CT at 2 and 4 years.   > 6 mm - CT at 3-6 months. Subsequent management based on the most   suspicious nodule(s).   Note - These recommendations do not apply to lung cancer screening,   patients with immunosuppression, or patients with known primary   cancer.    * Dimensions are average of long and short axes, rounded to the   nearest millimeter.   ** Consider all relevant risk factors.   Signed by Grzegorz Toure MD      XR chest 1 view   Final Result   No radiographic evidence of acute cardiopulmonary disease.   Signed by Kane Lopez MD           Procedures  Procedures     EMERGENCY DEPARTMENT COURSE/MDM:     Diagnoses as of 01/31/25 1607   Chest pain, unspecified type        Medical Decision Making  History obtained from the patient.  Records are not clinical notes independently reviewed by me.  History concerning for possible ACS, musculoskeletal pain, pulmonary embolism, fluid overload.  Cardiac labs are sent.  CMP with mild hyperglycemia 130 only thought to be clinically noncontributory.  CBC with a mild anemia 13.2 thought to be clinically noncontributory.  Troponin series normal and stable at 7, 6.  EKG without evidence of acute injury pattern.  Low suspicion for ACS at this time.  BMP within normal limits.  Chest x-ray without acute cardiopulmonary findings.  D-dimer elevated 2887.  CT angio of the chest was ordered which demonstrated pulmonary nodules but no acute cardiopulmonary findings.  This was discussed with the patient who understood the need for follow-up for the pulmonary nodules.  However, given the patient's extensive cardiac history, heart score of 5, lack of cardiac restratification testing since 2021, patient to be admitted for a cardiac observation stay to the internal medicine service.  Patient is amenable to this plan.    EKG independently reviewed by me demonstrates normal sinus rhythm at a rate of 99, normal intervals.  No acute injury pattern.    Patient and or family in agreement and understanding of treatment plan.  All questions answered.      I reviewed the case with the attending ED physician. The attending ED physician agrees with the plan. Patient and/or patient´s representative was counseled regarding labs, imaging, likely diagnosis,  and plan. All questions were answered.    ED Medications administered this visit:    Medications   sodium chloride 0.9 % bolus 1,000 mL (1,000 mL intravenous New Bag 1/31/25 1117)   iohexol (OMNIPaque) 350 mg iodine/mL solution 60 mL (60 mL intravenous Given 1/31/25 1405)       New Prescriptions from this visit:    New Prescriptions    No medications on file       Follow-up:  No follow-up provider specified.      Final Impression:   1. Chest pain, unspecified type          (Please note that portions of this note were completed with a voice recognition program.  Efforts were made to edit the dictations but occasionally words are mis-transcribed.)     Radames Vigil MD  Resident  01/31/25 0611

## 2025-01-31 NOTE — H&P
History Of Present Illness  Brannon Engel is a 69 y.o. M from [facility] with PMH of CAD, s/p PCI and stent, HTN, HLD, PAD, BPH, DVT not on AC, recent and penile implant complicated by infection and scrotal abscess requiring debridement and removal of implant, esophageal dysmotility presented to ER due to chest pain.  Pain was sharp, moderate in intensity, mostly on the left side, associated with dizziness and SOB as well as nausea.  He denied any obvious radiation.  He denied any obvious fever and chills.  Patient was brought to ER from where he is being admitted on 3 S. daily for further care.          Past Medical History  He has a past medical history of Anxiety, BPH (benign prostatic hyperplasia), Chronic indwelling Moya catheter, Depression, DVT (deep venous thrombosis) (Multi), Dysphagia, Emphysema lung (Multi), GERD (gastroesophageal reflux disease), HLD (hyperlipidemia), Hypertension, essential, Low back pain, MI, old, NIKITA (obstructive sleep apnea), PAD (peripheral artery disease) (CMS-HCC), PVD (peripheral vascular disease) (CMS-HCC), Schizophrenia, and Type 2 diabetes mellitus.    Surgical History  He has a past surgical history that includes Cardiac catheterization; Hip Arthroplasty; and Nissen fundoplication.     Social History  He reports that he has quit smoking. His smoking use included cigarettes. He has never used smokeless tobacco. No history on file for alcohol use and drug use.    Family History  Family History   Problem Relation Name Age of Onset    No Known Problems Mother      No Known Problems Father          Allergies  Ace inhibitors    Current medications:      Current Facility-Administered Medications:     acetaminophen (Tylenol) tablet 650 mg, 650 mg, oral, q4h PRN **OR** acetaminophen (Tylenol) oral liquid 650 mg, 650 mg, oral, q4h PRN **OR** acetaminophen (Tylenol) suppository 650 mg, 650 mg, rectal, q4h PRN, Karina Gonzalez, APRN-CNP    albuterol 2.5 mg /3 mL (0.083 %) nebulizer solution  2.5 mg, 2.5 mg, nebulization, q4h PRN, DARIEN Marcus-CNP    [START ON 2/1/2025] aspirin EC tablet 81 mg, 81 mg, oral, Daily, DARIEN Marcus-CNP    [START ON 2/1/2025] atorvastatin (Lipitor) tablet 80 mg, 80 mg, oral, Daily, DARIEN Marcus-CNP    benzocaine-menthol (Cepastat Sore Throat) lozenge 1 lozenge, 1 lozenge, Mouth/Throat, q4h PRN, DARIEN Marcus-CNP    calcium carbonate (Tums) chewable tablet 500 mg, 500 mg, oral, 4x daily PRN, Rashaad Scott MD    cetirizine (ZyrTEC) tablet 10 mg, 10 mg, oral, Daily, DARIEN Marcus-CNP    [START ON 2/1/2025] DULoxetine (Cymbalta) DR capsule 60 mg, 60 mg, oral, Daily, DARIEN Marcus-CNP    enoxaparin (Lovenox) syringe 40 mg, 40 mg, subcutaneous, q24h, DARIEN Marcus-CNP    insulin glargine (Lantus) injection 44 Units, 44 Units, subcutaneous, Nightly, DARIEN Marcus-CNP    [START ON 2/1/2025] insulin lispro injection 6 Units, 6 Units, subcutaneous, TID, DARIEN Marcus-CNP    [START ON 2/1/2025] isosorbide mononitrate ER (Imdur) 24 hr tablet 60 mg, 60 mg, oral, Daily, DARIEN Marcus-CNP    lithium ER (Lithobid) extended release tablet 300 mg, 300 mg, oral, BID, DARIEN Marcus-CNP    lurasidone (Latuda) tablet 80 mg, 80 mg, oral, Nightly, Karina Gonzalez APRN-CNP    methocarbamol (Robaxin) tablet 500 mg, 500 mg, oral, q8h VILMA, DARIEN Marcus-CNP    metoprolol tartrate (Lopressor) tablet 12.5 mg, 12.5 mg, oral, BID, PARI Marcus    nitroglycerin (Nitrostat) SL tablet 0.4 mg, 0.4 mg, sublingual, q5 min PRN, PARI Marcus    oxyCODONE (Roxicodone) immediate release tablet 5 mg, 5 mg, oral, q8h PRN, PARI Marcus    [START ON 2/1/2025] pantoprazole (ProtoNix) EC tablet 40 mg, 40 mg, oral, Daily before breakfast, PARI Marcus    polyethylene glycol (Glycolax, Miralax) packet 17 g, 17 g, oral, Daily PRN, PARI Marcus    pregabalin  "(Lyrica) capsule 200 mg, 200 mg, oral, BID, Karina M Bartos, APRN-CNP    QUEtiapine (SEROquel) tablet 25 mg, 25 mg, oral, Nightly, Karina M Bartos, APRN-CNP    sennosides (Senokot) tablet 17.2 mg, 2 tablet, oral, Nightly PRN, Karina M Bartos, APRN-CNP    [START ON 2/1/2025] tamsulosin (Flomax) 24 hr capsule 0.4 mg, 0.4 mg, oral, Daily, Karina M Bartos, APRN-CNP    traZODone (Desyrel) tablet 50 mg, 50 mg, oral, Nightly, Karina M Bartos, APRN-CNP    [START ON 2/1/2025] trihexyphenidyl (Artane) tablet 2 mg, 2 mg, oral, BID, Karina M Bartos, APRN-CNP       Review of Systems     10 organ ROS is pertinent for history mentioned above, otherwise negative      Physical Exam  HENT:      Head: Normocephalic.   Eyes:      Conjunctiva/sclera: Conjunctivae normal.   Cardiovascular:      Rate and Rhythm: Regular rhythm.   Pulmonary:      Breath sounds: Normal breath sounds.   Abdominal:      General: Bowel sounds are normal.      Palpations: Abdomen is soft.   Musculoskeletal:      Cervical back: Neck supple.      Comments: No edema   Skin:     General: Skin is warm and dry.   Neurological:      Mental Status: He is alert.      Comments: Moving all 4 limbs   Psychiatric:         Behavior: Behavior normal.              Last Recorded Vitals      Blood pressure 132/83, pulse 97, temperature 36.7 °C (98.1 °F), resp. rate 20, height 1.778 m (5' 10\"), weight 78.8 kg (173 lb 11.6 oz), SpO2 98%.    Relevant Results     Results for orders placed or performed during the hospital encounter of 01/31/25 (from the past 24 hours)   ECG 12 lead   Result Value Ref Range    Ventricular Rate 99 BPM    Atrial Rate 99 BPM    GA Interval 170 ms    QRS Duration 76 ms    QT Interval 330 ms    QTC Calculation(Bazett) 423 ms    P Axis 59 degrees    R Axis 33 degrees    T Axis 76 degrees    QRS Count 17 beats    Q Onset 226 ms    P Onset 141 ms    P Offset 194 ms    T Offset 391 ms    QTC Fredericia 389 ms   CBC with Differential   Result Value Ref Range    " WBC 11.2 4.4 - 11.3 x10*3/uL    nRBC 0.0 0.0 - 0.0 /100 WBCs    RBC 4.77 4.50 - 5.90 x10*6/uL    Hemoglobin 13.2 (L) 13.5 - 17.5 g/dL    Hematocrit 43.3 41.0 - 52.0 %    MCV 91 80 - 100 fL    MCH 27.7 26.0 - 34.0 pg    MCHC 30.5 (L) 32.0 - 36.0 g/dL    RDW 15.6 (H) 11.5 - 14.5 %    Platelets 315 150 - 450 x10*3/uL    Neutrophils % 72.1 40.0 - 80.0 %    Immature Granulocytes %, Automated 0.4 0.0 - 0.9 %    Lymphocytes % 18.3 13.0 - 44.0 %    Monocytes % 6.5 2.0 - 10.0 %    Eosinophils % 2.4 0.0 - 6.0 %    Basophils % 0.3 0.0 - 2.0 %    Neutrophils Absolute 8.05 (H) 1.20 - 7.70 x10*3/uL    Immature Granulocytes Absolute, Automated 0.04 0.00 - 0.70 x10*3/uL    Lymphocytes Absolute 2.04 1.20 - 4.80 x10*3/uL    Monocytes Absolute 0.72 0.10 - 1.00 x10*3/uL    Eosinophils Absolute 0.27 0.00 - 0.70 x10*3/uL    Basophils Absolute 0.03 0.00 - 0.10 x10*3/uL   Comprehensive Metabolic Panel   Result Value Ref Range    Glucose 130 (H) 74 - 99 mg/dL    Sodium 136 136 - 145 mmol/L    Potassium 3.7 3.5 - 5.3 mmol/L    Chloride 105 98 - 107 mmol/L    Bicarbonate 22 21 - 32 mmol/L    Anion Gap 13 10 - 20 mmol/L    Urea Nitrogen 10 6 - 23 mg/dL    Creatinine 1.06 0.50 - 1.30 mg/dL    eGFR 77 >60 mL/min/1.73m*2    Calcium 9.0 8.6 - 10.3 mg/dL    Albumin 3.5 3.4 - 5.0 g/dL    Alkaline Phosphatase 74 33 - 136 U/L    Total Protein 7.5 6.4 - 8.2 g/dL    AST 23 9 - 39 U/L    Bilirubin, Total 0.4 0.0 - 1.2 mg/dL    ALT 15 10 - 52 U/L   Protime-INR   Result Value Ref Range    Protime 14.1 (H) 9.8 - 12.8 seconds    INR 1.3 (H) 0.9 - 1.1   Troponin I, High Sensitivity, Initial   Result Value Ref Range    Troponin I, High Sensitivity 7 0 - 20 ng/L   Magnesium   Result Value Ref Range    Magnesium 1.89 1.60 - 2.40 mg/dL   D-Dimer, VTE Exclusion   Result Value Ref Range    D-Dimer, Quantitative VTE Exclusion 2,887 (H) <=500 ng/mL FEU   B-type natriuretic peptide   Result Value Ref Range    BNP 49 0 - 99 pg/mL   Troponin, High Sensitivity, 1 Hour    Result Value Ref Range    Troponin I, High Sensitivity 6 0 - 20 ng/L   Drug Screen, Urine   Result Value Ref Range    Amphetamine Screen, Urine Presumptive Negative Presumptive Negative    Barbiturate Screen, Urine Presumptive Negative Presumptive Negative    Benzodiazepines Screen, Urine Presumptive Negative Presumptive Negative    Cannabinoid Screen, Urine Presumptive Negative Presumptive Negative    Cocaine Metabolite Screen, Urine Presumptive Negative Presumptive Negative    Fentanyl Screen, Urine Presumptive Negative Presumptive Negative    Opiate Screen, Urine Presumptive Negative Presumptive Negative    Oxycodone Screen, Urine Presumptive Positive (A) Presumptive Negative    PCP Screen, Urine Presumptive Negative Presumptive Negative    Methadone Screen, Urine Presumptive Negative Presumptive Negative          Radiology  STUDY:  Chest Radiograph;  1/31/25 at 12:07 PM  INDICATION:  Chest pain.  COMPARISON:  Chest XR 10/17/22.  ACCESSION NUMBER(S):  [id number]  ORDERING CLINICIAN:  LUZ MARIA STOKES  TECHNIQUE:  Frontal chest was obtained at 1206 hours.  FINDINGS:  CARDIOMEDIASTINAL SILHOUETTE:  Cardiomediastinal silhouette is normal in size and configuration.     LUNGS:  There is mild elevation of left hemidiaphragm.     ABDOMEN:  No remarkable upper abdominal findings.     BONES:  No acute osseous changes.  IMPRESSION:  No radiographic evidence of acute cardiopulmonary disease.      CT Angiogram of the Chest; 1/31/25 at 2:17 PM  INDICATION:  Chest pain, tachycardia, +D-dimer.  COMPARISON:  Chest XR same date.  ACCESSION NUMBER(S):  [id number]  ORDERING CLINICIAN:  LUZ MARIA STOKES  TECHNIQUE:  CTA of the chest was performed with intravenous contrast.   Images are reviewed and processed at a workstation according to the CT  angiogram protocol with 3-D and/or MIP post processing imaging  generated.  Omnipaque 350 60 mL was administered intravenously.  Automated mA/kV exposure control was utilized and patient  examination  was performed in strict accordance with principles of ALARA.  FINDINGS:  Pulmonary arteries are adequately opacified without acute or chronic  filling defects.  The thoracic aorta is normal in course and caliber  without dissection or aneurysm.  The heart is normal in size without pericardial effusion.  Moderate  atherosclerotic disease of the coronary arteries which may be  compatible with underlying heart disease.  Thoracic lymph nodes are  not enlarged.  There is no pleural effusion, pleural thickening, or pneumothorax.   The airways are patent.  Mild to moderate emphysema seen within the upper lung fields.   Subpleural nodule of 0.6 cm seen within the periphery of the right  upper lobe.  Series #401 slice 101.  Right middle lobe subpleural  nodule of 0.6 cm.  Series #401 slice 1.  There is segmental  atelectasis involving both posterior lung bases.  Series #401 slice  189.  Nodule of 0.7 cm seen within the right costophrenic sulcus.   Series #401 slice 13.   Upper abdomen demonstrates no acute pathology.  There are no acute fractures.  No suspicious bony lesions.  Moderate  narrowing of the intervertebral disc space at C6-7.  Thoracic spine  vertebral bodies are unremarkable.  IMPRESSION:  1. No acute or chronic pulmonary emboli.  2. No thoracic aortic aneurysm or dissection.  3. Mild to moderate emphysema within the upper lung fields.  4. Subpleural nodules of 0.6 cm within the right upper lobe and right  middle lobe.  See recommendations below.  5. Segmental atelectasis involving both posterior lung bases.  6. Nodule of 0.7 cm seen within the right costophrenic sulcus.  Fleischner Society 2017 Guidelines for Management of Incidentally  Detected Pulmonary Nodules in Adults:  A. SOLID NODULES*  Nodule Type and Size:  Single:  · Low Risk**   < 6 mm - No routine follow-up  6-8 mm - CT at 6-12 months, then consider CT at 18-24 months  > 8 mm - Consider CT at 3 months, PET/CT, or tissue sampling  · High  Risk**  < 6 mm - Optional CT at 12 months  6-8 mm - CT at 6-12 months, then CT at 18-24 months  > 8 mm - Consider CT at 3 months, PET/CT, or tissue sampling  Multiple:  · Low Risk**   < 6 mm - No routine follow-up  6-8 mm - CT at 3-6 months, then consider CT at 18-24 months  > 8 mm - CT at 3-6 months, then consider CT at 18-24 months  · High Risk**  < 6 mm - Optional CT at 12 months  6-8 mm - CT at 3-6 months, then at 18-24 months  > 8 mm - CT at 3-6 months, then at 18-24 months  B. SUBSOLID NODULES*  Nodule Type and Size:  Single:   · Ground glass  < 6 mm - No routine follow-up  > 6 mm - CT at 6-12 months to confirm persistence, then CT every 2  years until 5 years  · Part Solid  < 6 mm - No routine follow-up  > 6 mm - CT at 3-6 months to confirm persistence. If unchanged and  solid component remains < 6 mm, annual CT should be performed for 5  years.  Multiple:  < 6 mm - CT at 3-6 months. If stable, consider CT at 2 and 4 years.  > 6 mm - CT at 3-6 months. Subsequent management based on the most  suspicious nodule(s).  Note - These recommendations do not apply to lung cancer screening,  patients with immunosuppression, or patients with known primary  cancer.  * Dimensions are average of long and short axes, rounded to the  nearest millimeter.        Assessment/Plan   Assessment & Plan  Chest pain, unspecified type    Esophageal dysmotility    S/p recent post COVID PNA      PLAN: patient is admitted on a telemetry floor, trend troponin, monitor on telemetry for any arrhythmia, workup to rule out ACS, cardiology consult, continue with other medications, DVT prophylaxis, discussed with nursing and CNP, follow closely.              Rashaad cSott MD

## 2025-01-31 NOTE — CARE PLAN
The patient's goals for the shift include    Problem: Pain - Adult  Goal: Verbalizes/displays adequate comfort level or baseline comfort level  Outcome: Progressing     Problem: Safety - Adult  Goal: Free from fall injury  Outcome: Progressing     Problem: Discharge Planning  Goal: Discharge to home or other facility with appropriate resources  Outcome: Progressing     Problem: Chronic Conditions and Co-morbidities  Goal: Patient's chronic conditions and co-morbidity symptoms are monitored and maintained or improved  Outcome: Progressing     Problem: Nutrition  Goal: Nutrient intake appropriate for maintaining nutritional needs  Outcome: Progressing     Problem: Fall/Injury  Goal: Not fall by end of shift  Outcome: Progressing  Goal: Be free from injury by end of the shift  Outcome: Progressing  Goal: Verbalize understanding of personal risk factors for fall in the hospital  Outcome: Progressing  Goal: Verbalize understanding of risk factor reduction measures to prevent injury from fall in the home  Outcome: Progressing  Goal: Use assistive devices by end of the shift  Outcome: Progressing  Goal: Pace activities to prevent fatigue by end of the shift  Outcome: Progressing     Problem: Pain  Goal: Takes deep breaths with improved pain control throughout the shift  Outcome: Progressing  Goal: Turns in bed with improved pain control throughout the shift  Outcome: Progressing  Goal: Walks with improved pain control throughout the shift  Outcome: Progressing  Goal: Performs ADL's with improved pain control throughout shift  Outcome: Progressing  Goal: Participates in PT with improved pain control throughout the shift  Outcome: Progressing  Goal: Free from opioid side effects throughout the shift  Outcome: Progressing  Goal: Free from acute confusion related to pain meds throughout the shift  Outcome: Progressing       The clinical goals for the shift include pt will be free of chest pain this shift

## 2025-01-31 NOTE — H&P
History Of Present Illness  Brannon Engel is a 69 y.o. male with medical history of MI s/p PCI, HTN, HLD, PAD, NIKITA, anxiety, GERD, BPH, CAD, depression, DVT not on anticoagulation, esophageal dysphagia, and recent infected penile prostatic with removal and surgical debridement of scrotal abscess presented to [facility] on 1/31/2025 with complaint of chest pain.  Patient lives at skilled nursing facility and developed chest pain yesterday.  Chest pain is midsternal.  Chest pain is sharp rated a 6 out of 10.  Patient received multiple doses of nitroglycerin which were ineffective and pain relief.  Patient endorses dizziness, shortness of breath, and nausea with chest pain.  Chest pain is nonradiating.  Of note patient is COVID recovered for the last 10 days and currently being treated for post-COVID pneumonia.  Chest pain is mid sternal, sometimes worse with cough other times unchanged with cough.  Chest pain is reproducible upon chest wall pressure.  Endorses cough with white sputum production.  Denies recent fever, chills, abdominal pain, nausea, vomiting, diarrhea, urinary symptoms, melena, or hematochezia.    ED course: BMP unremarkable.  Troponin 7, 6.  D-dimer 2887.  CBC unremarkable no leukocytosis or anemia noted. CT of the chest no PE or dissection noted.  Mild to moderate emphysema.  Notable for 2 different nodules that require outpatient follow-up.  EKG normal sinus rhythm no ST elevations depressions.     Past Medical History  Past Medical History:   Diagnosis Date    Anxiety     BPH (benign prostatic hyperplasia)     Chronic indwelling Moya catheter     Depression     DVT (deep venous thrombosis) (Multi)     Dysphagia     Emphysema lung (Multi)     GERD (gastroesophageal reflux disease)     HLD (hyperlipidemia)     Hypertension, essential     Low back pain     MI, old     NIKITA (obstructive sleep apnea)     PAD (peripheral artery disease) (CMS-HCC)     PVD (peripheral vascular disease) (CMS-HCC)      Schizophrenia     Type 2 diabetes mellitus        Surgical History  Past Surgical History:   Procedure Laterality Date    CARDIAC CATHETERIZATION      HIP ARTHROPLASTY      NISSEN FUNDOPLICATION          Social History  Social History     Tobacco Use    Smoking status: Former     Types: Cigarettes    Smokeless tobacco: Never        Family History  Family History   Problem Relation Name Age of Onset    No Known Problems Mother      No Known Problems Father          Allergies  Ace inhibitors    Review of Systems   Constitutional:  Negative for chills and fever.   Respiratory:  Positive for cough and chest tightness. Negative for shortness of breath and wheezing.    Cardiovascular:  Positive for chest pain. Negative for palpitations and leg swelling.   Gastrointestinal:  Positive for nausea. Negative for abdominal distention, abdominal pain, blood in stool, diarrhea and vomiting.   Genitourinary:  Negative for dysuria and frequency.        Patient with chronic Moya catheter.     Musculoskeletal:  Negative for joint swelling and neck pain.   Skin: Negative.    Neurological:  Positive for dizziness. Negative for seizures, weakness and light-headedness.       Physical Exam  Constitutional:       General: He is not in acute distress.     Appearance: Normal appearance. He is not ill-appearing or toxic-appearing.   Eyes:      Extraocular Movements: Extraocular movements intact.      Pupils: Pupils are equal, round, and reactive to light.   Cardiovascular:      Rate and Rhythm: Normal rate and regular rhythm.      Heart sounds: No murmur heard.  Pulmonary:      Effort: Pulmonary effort is normal.      Breath sounds: Normal breath sounds.   Abdominal:      General: Abdomen is flat. Bowel sounds are normal. There is no distension.      Palpations: Abdomen is soft.      Tenderness: There is no guarding or rebound.   Musculoskeletal:         General: Normal range of motion.      Right lower leg: No edema.      Left lower leg: No  "edema.   Skin:     General: Skin is warm and dry.      Capillary Refill: Capillary refill takes less than 2 seconds.      Comments: Patient with wound to peritoneum.  recent removal of infected penile prosthesis with I&D of scrotal abscess.  Patient declines wound evaluation at this time   Neurological:      Mental Status: He is alert and oriented to person, place, and time.   Psychiatric:         Mood and Affect: Mood normal.         Behavior: Behavior normal.         Thought Content: Thought content normal.         Judgment: Judgment normal.         Last Recorded Vitals  Visit Vitals  BP (!) 140/100 (BP Location: Right arm, Patient Position: Lying)   Pulse 98   Temp 36 °C (96.8 °F) (Temporal)   Resp (!) 24   Ht 1.778 m (5' 10\")   Wt 79.8 kg (175 lb 14.8 oz)   SpO2 99%   BMI 25.24 kg/m²   Smoking Status Former   BSA 1.99 m²        Relevant Results  Results for orders placed or performed during the hospital encounter of 01/31/25 (from the past 24 hours)   ECG 12 lead   Result Value Ref Range    Ventricular Rate 99 BPM    Atrial Rate 99 BPM    PA Interval 170 ms    QRS Duration 76 ms    QT Interval 330 ms    QTC Calculation(Bazett) 423 ms    P Axis 59 degrees    R Axis 33 degrees    T Axis 76 degrees    QRS Count 17 beats    Q Onset 226 ms    P Onset 141 ms    P Offset 194 ms    T Offset 391 ms    QTC Fredericia 389 ms   CBC with Differential   Result Value Ref Range    WBC 11.2 4.4 - 11.3 x10*3/uL    nRBC 0.0 0.0 - 0.0 /100 WBCs    RBC 4.77 4.50 - 5.90 x10*6/uL    Hemoglobin 13.2 (L) 13.5 - 17.5 g/dL    Hematocrit 43.3 41.0 - 52.0 %    MCV 91 80 - 100 fL    MCH 27.7 26.0 - 34.0 pg    MCHC 30.5 (L) 32.0 - 36.0 g/dL    RDW 15.6 (H) 11.5 - 14.5 %    Platelets 315 150 - 450 x10*3/uL    Neutrophils % 72.1 40.0 - 80.0 %    Immature Granulocytes %, Automated 0.4 0.0 - 0.9 %    Lymphocytes % 18.3 13.0 - 44.0 %    Monocytes % 6.5 2.0 - 10.0 %    Eosinophils % 2.4 0.0 - 6.0 %    Basophils % 0.3 0.0 - 2.0 %    Neutrophils " Absolute 8.05 (H) 1.20 - 7.70 x10*3/uL    Immature Granulocytes Absolute, Automated 0.04 0.00 - 0.70 x10*3/uL    Lymphocytes Absolute 2.04 1.20 - 4.80 x10*3/uL    Monocytes Absolute 0.72 0.10 - 1.00 x10*3/uL    Eosinophils Absolute 0.27 0.00 - 0.70 x10*3/uL    Basophils Absolute 0.03 0.00 - 0.10 x10*3/uL   Comprehensive Metabolic Panel   Result Value Ref Range    Glucose 130 (H) 74 - 99 mg/dL    Sodium 136 136 - 145 mmol/L    Potassium 3.7 3.5 - 5.3 mmol/L    Chloride 105 98 - 107 mmol/L    Bicarbonate 22 21 - 32 mmol/L    Anion Gap 13 10 - 20 mmol/L    Urea Nitrogen 10 6 - 23 mg/dL    Creatinine 1.06 0.50 - 1.30 mg/dL    eGFR 77 >60 mL/min/1.73m*2    Calcium 9.0 8.6 - 10.3 mg/dL    Albumin 3.5 3.4 - 5.0 g/dL    Alkaline Phosphatase 74 33 - 136 U/L    Total Protein 7.5 6.4 - 8.2 g/dL    AST 23 9 - 39 U/L    Bilirubin, Total 0.4 0.0 - 1.2 mg/dL    ALT 15 10 - 52 U/L   Protime-INR   Result Value Ref Range    Protime 14.1 (H) 9.8 - 12.8 seconds    INR 1.3 (H) 0.9 - 1.1   Troponin I, High Sensitivity, Initial   Result Value Ref Range    Troponin I, High Sensitivity 7 0 - 20 ng/L   Magnesium   Result Value Ref Range    Magnesium 1.89 1.60 - 2.40 mg/dL   D-Dimer, VTE Exclusion   Result Value Ref Range    D-Dimer, Quantitative VTE Exclusion 2,887 (H) <=500 ng/mL FEU   B-type natriuretic peptide   Result Value Ref Range    BNP 49 0 - 99 pg/mL   Troponin, High Sensitivity, 1 Hour   Result Value Ref Range    Troponin I, High Sensitivity 6 0 - 20 ng/L      Imaging:  CT angio chest for pulmonary embolism   Final Result   1. No acute or chronic pulmonary emboli.   2. No thoracic aortic aneurysm or dissection.   3. Mild to moderate emphysema within the upper lung fields.   4. Subpleural nodules of 0.6 cm within the right upper lobe and right   middle lobe.  See recommendations below.   5. Segmental atelectasis involving both posterior lung bases.   6. Nodule of 0.7 cm seen within the right costophrenic sulcus.   [employer] 2017 Guidelines for Management of Incidentally   Detected Pulmonary Nodules in Adults:   A. SOLID NODULES*   Nodule Type and Size:   Single:   · Low Risk**    < 6 mm - No routine follow-up   6-8 mm - CT at 6-12 months, then consider CT at 18-24 months   > 8 mm - Consider CT at 3 months, PET/CT, or tissue sampling   · High Risk**   < 6 mm - Optional CT at 12 months   6-8 mm - CT at 6-12 months, then CT at 18-24 months   > 8 mm - Consider CT at 3 months, PET/CT, or tissue sampling   Multiple:   · Low Risk**    < 6 mm - No routine follow-up   6-8 mm - CT at 3-6 months, then consider CT at 18-24 months   > 8 mm - CT at 3-6 months, then consider CT at 18-24 months   · High Risk**   < 6 mm - Optional CT at 12 months   6-8 mm - CT at 3-6 months, then at 18-24 months   > 8 mm - CT at 3-6 months, then at 18-24 months   B. SUBSOLID NODULES*   Nodule Type and Size:   Single:    · Ground glass   < 6 mm - No routine follow-up   > 6 mm - CT at 6-12 months to confirm persistence, then CT every 2   years until 5 years   · Part Solid   < 6 mm - No routine follow-up   > 6 mm - CT at 3-6 months to confirm persistence. If unchanged and   solid component remains < 6 mm, annual CT should be performed for 5   years.   Multiple:   < 6 mm - CT at 3-6 months. If stable, consider CT at 2 and 4 years.   > 6 mm - CT at 3-6 months. Subsequent management based on the most   suspicious nodule(s).   Note - These recommendations do not apply to lung cancer screening,   patients with immunosuppression, or patients with known primary   cancer.   * Dimensions are average of long and short axes, rounded to the   nearest millimeter.   ** Consider all relevant risk factors.   Signed by Grzegorz Toure MD      XR chest 1 view   Final Result   No radiographic evidence of acute cardiopulmonary disease.   Signed by Kane Lopez MD          EKG:  Encounter Date: 01/31/25   ECG 12 lead   Result Value    Ventricular Rate 99    Atrial Rate 99    CT Interval  170    QRS Duration 76    QT Interval 330    QTC Calculation(Bazett) 423    P Axis 59    R Axis 33    T Axis 76    QRS Count 17    Q Onset 226    P Onset 141    P Offset 194    T Offset 391    QTC Fredericia 389    Narrative    Normal sinus rhythm  Normal ECG  When compared with ECG of 17-OCT-2022 11:19,  Previous ECG has undetermined rhythm, needs review     Echo:  No results found for this or any previous visit.    Home Medications  Prior to Admission medications    Medication Sig Start Date End Date Taking? Authorizing Provider   amoxicillin-pot clavulanate (Augmentin) 500-125 mg tablet Take 1 tablet by mouth 2 times a day. 1/24/25 1/31/25 Yes Historical Provider, MD   benzocaine-menthol (Cepacol Sore Throat, beth-men,) lozenge Dissolve 1 lozenge in the mouth every 4 hours if needed for sore throat.   Yes Historical Provider, MD   loratadine (Claritin) 10 mg tablet Take 1 tablet (10 mg) by mouth once daily as needed for allergies.   Yes Historical Provider, MD   polyethylene glycol (Glycolax, Miralax) 17 gram packet Take 17 g by mouth once daily as needed (constipation).   Yes Historical Provider, MD   acetaminophen (Tylenol) 325 mg tablet Take 2 tablets (650 mg) by mouth every 4 hours if needed for mild pain (1 - 3). 11/11/24   DARIEN Sousa-CNP   aspirin 81 mg EC tablet Take 1 tablet (81 mg) by mouth once daily.    Historical Provider, MD   atorvastatin (Lipitor) 80 mg tablet Take 1 tablet (80 mg) by mouth once daily.    Historical Provider, MD   calcium carbonate-vitamin D3 500 mg-5 mcg (200 unit) tablet Take 1 tablet by mouth 2 times a day.    Historical Provider, MD   cholecalciferol (Vitamin D3) 25 MCG (1000 UT) tablet Take 1 tablet (1,000 Units) by mouth once daily.    Historical Provider, MD   cyanocobalamin (Vitamin B-12) 1,000 mcg tablet Take 1 tablet (1,000 mcg) by mouth once daily.    Historical Provider, MD   diclofenac sodium (Voltaren Arthritis Pain) 1 % gel Apply 4.5 inches (4 g) topically  every 2 hours if needed.    Historical Provider, MD   docusate sodium (Colace) 100 mg capsule Take 1 capsule (100 mg) by mouth 2 times a day.    Historical Provider, MD   DULoxetine (Cymbalta) 60 mg DR capsule Take 1 capsule (60 mg) by mouth once daily. Do not crush or chew.    Historical Provider, MD   esomeprazole (NexIUM) 40 mg DR capsule Take 1 capsule (40 mg) by mouth 2 times a day. Do not open capsule.    Historical Provider, MD   ferrous sulfate 325 (65 Fe) MG EC tablet Take 65 mg by mouth once daily. M,W,F  Do not crush, chew, or split.    Historical Provider, MD   fluticasone (Flonase) 50 mcg/actuation nasal spray Administer 1 spray into each nostril once daily. Shake gently. Before first use, prime pump. After use, clean tip and replace cap.    Historical Provider, MD   glucose (Glutose) 40 % gel oral gel Take 15 g by mouth if needed for low blood sugar - see comments.    Historical Provider, MD   hydrocortisone (Anusol-HC) 25 mg suppository Insert 1 suppository (25 mg) into the rectum 2 times a day as needed for hemorrhoids.    Historical Provider, MD   insulin glargine (Lantus U-100 Insulin) 100 unit/mL injection Inject 44 Units under the skin once daily at bedtime. Take as directed per insulin instructions.    Historical Provider, MD   insulin lispro 100 unit/mL injection Inject 6 Units under the skin 3 times daily (morning, midday, late afternoon). Take as directed per insulin instructions. 11/11/24   PARI Sousa   isosorbide mononitrate ER (Imdur) 60 mg 24 hr tablet Take 1 tablet (60 mg) by mouth once daily. Do not crush or chew.    Historical Provider, MD   lidocaine 4 % patch Place 1 patch over 12 hours on the skin once daily. Remove & discard patch within 12 hours or as directed by MD. 11/12/24   PARI Sousa   lidocaine 4 % patch Place 1 patch over 12 hours on the skin once daily. Remove & discard patch within 12 hours or as directed by MD. 11/12/24   PARI Sousa    lithium ER (Lithobid) 300 mg 12 hr tablet Take 1 tablet (300 mg) by mouth twice a day. 9/19/24   Historical Provider, MD   methocarbamol (Robaxin) 500 mg tablet Take 1 tablet (500 mg) by mouth every 8 hours. 11/11/24   PARI Sousa   multivitamin tablet Take 1 tablet by mouth once daily.    Historical Provider, MD   nitroglycerin (Nitrostat) 0.4 mg SL tablet Place 1 tablet (0.4 mg) under the tongue every 5 minutes if needed for chest pain.    Historical Provider, MD   polyethylene glycol (Glycolax, Miralax) 17 gram packet Take 17 g by mouth once daily. 11/12/24   PARI Sousa   pregabalin (Lyrica) 200 mg capsule Take 1 capsule (200 mg) by mouth 2 times a day.    Historical Provider, MD   QUEtiapine (SEROquel) 25 mg tablet Take 1 tablet daily at bedtimes 9/19/24   Historical Provider, MD   semaglutide (Ozempic) 0.25 mg or 0.5 mg(2 mg/1.5 mL) pen injector Inject 0.25 mg under the skin 1 (one) time per week. saturday    Historical Provider, MD   tamsulosin (Flomax) 0.4 mg 24 hr capsule Take 1 capsule (0.4 mg) by mouth once daily. 11/11/24   PARI Sousa   traZODone (Desyrel) 50 mg tablet Take 1 tablet (50 mg) by mouth once daily at bedtime. 11/11/24   PARI Sousa   trihexyphenidyl (Artane) 2 mg tablet Take 1 tablet (2 mg) by mouth twice a day. 9/19/24   Historical Provider, MD   varenicline (Chantix) 1 mg tablet Take 1 tablet (1 mg) by mouth 2 times a day. Take with full glass of water.    Historical Provider, MD       Medications  Scheduled medications  enoxaparin, 40 mg, subcutaneous, q24h      Continuous medications     PRN medications  acetaminophen, 650 mg, q4h PRN   Or  acetaminophen, 650 mg, q4h PRN   Or  acetaminophen, 650 mg, q4h PRN  polyethylene glycol, 17 g, Daily PRN  sennosides, 2 tablet, Nightly PRN        Assessment/Plan   Assessment & Plan  Chest pain, unspecified type  -Monitor on telemetry q4 hour vitals  -Cards consult, defer to cards regarding need for  echo  -Troponins x 2 negative  -EKG normal sinus rhythm.  No ST elevations or depressions  -Chest pain is likely in the setting of recently recovered COVID-19 infection.  Chest pain is reproducible to chest wall pressure not likely cardiac in nature  -home medications to be resumed when home med rec competed   -Currently on antibiotics for post-COVID PNA.    VTE prophylaxis:   DVT prophylaxis: SCDs      See additional orders for further plan of care.   Further evaluation and management per attending and consulting physicians.      Code Status  Full code        I spent a total of 45 minutes on the date of the service in the professional and overall care of this patient.which included preparing to see the patient, face-to-face patient care, completing clinical documentation, and obtaining and/or reviewing separately obtained history.     DARIEN Marcus-[facility]  Pager [phone]    This note has been transcribed using Dragon voice recognition system and there is a possibility of unintentional typing misprints.    --CT with pulmonary nodules should have repeat CT imaging as an outpatient.  CT should be repeated in 3 to 6 months and then 18 to 24 months if no change

## 2025-02-01 LAB
ANION GAP SERPL CALC-SCNC: 12 MMOL/L (ref 10–20)
ATRIAL RATE: 95 BPM
BUN SERPL-MCNC: 8 MG/DL (ref 6–23)
CALCIUM SERPL-MCNC: 8.8 MG/DL (ref 8.6–10.3)
CHLORIDE SERPL-SCNC: 107 MMOL/L (ref 98–107)
CO2 SERPL-SCNC: 20 MMOL/L (ref 21–32)
CREAT SERPL-MCNC: 0.91 MG/DL (ref 0.5–1.3)
EGFRCR SERPLBLD CKD-EPI 2021: >90 ML/MIN/1.73M*2
ERYTHROCYTE [DISTWIDTH] IN BLOOD BY AUTOMATED COUNT: 15.5 % (ref 11.5–14.5)
GLUCOSE BLD MANUAL STRIP-MCNC: 104 MG/DL (ref 74–99)
GLUCOSE BLD MANUAL STRIP-MCNC: 137 MG/DL (ref 74–99)
GLUCOSE BLD MANUAL STRIP-MCNC: 178 MG/DL (ref 74–99)
GLUCOSE BLD MANUAL STRIP-MCNC: 188 MG/DL (ref 74–99)
GLUCOSE SERPL-MCNC: 83 MG/DL (ref 74–99)
HCT VFR BLD AUTO: 39.4 % (ref 41–52)
HGB BLD-MCNC: 11.5 G/DL (ref 13.5–17.5)
MCH RBC QN AUTO: 26.4 PG (ref 26–34)
MCHC RBC AUTO-ENTMCNC: 29.2 G/DL (ref 32–36)
MCV RBC AUTO: 90 FL (ref 80–100)
NRBC BLD-RTO: 0 /100 WBCS (ref 0–0)
P AXIS: 48 DEGREES
P OFFSET: 192 MS
P ONSET: 138 MS
PLATELET # BLD AUTO: 307 X10*3/UL (ref 150–450)
POTASSIUM SERPL-SCNC: 3.3 MMOL/L (ref 3.5–5.3)
PR INTERVAL: 176 MS
Q ONSET: 226 MS
QRS COUNT: 15 BEATS
QRS DURATION: 76 MS
QT INTERVAL: 346 MS
QTC CALCULATION(BAZETT): 434 MS
QTC FREDERICIA: 403 MS
R AXIS: 24 DEGREES
RBC # BLD AUTO: 4.36 X10*6/UL (ref 4.5–5.9)
SODIUM SERPL-SCNC: 136 MMOL/L (ref 136–145)
T AXIS: 62 DEGREES
T OFFSET: 399 MS
VENTRICULAR RATE: 95 BPM
WBC # BLD AUTO: 9 X10*3/UL (ref 4.4–11.3)

## 2025-02-01 PROCEDURE — 96372 THER/PROPH/DIAG INJ SC/IM: CPT | Performed by: NURSE PRACTITIONER

## 2025-02-01 PROCEDURE — 85027 COMPLETE CBC AUTOMATED: CPT | Performed by: NURSE PRACTITIONER

## 2025-02-01 PROCEDURE — G0378 HOSPITAL OBSERVATION PER HR: HCPCS

## 2025-02-01 PROCEDURE — 2500000002 HC RX 250 W HCPCS SELF ADMINISTERED DRUGS (ALT 637 FOR MEDICARE OP, ALT 636 FOR OP/ED): Mod: MUE | Performed by: NURSE PRACTITIONER

## 2025-02-01 PROCEDURE — 2500000001 HC RX 250 WO HCPCS SELF ADMINISTERED DRUGS (ALT 637 FOR MEDICARE OP): Performed by: INTERNAL MEDICINE

## 2025-02-01 PROCEDURE — 2500000002 HC RX 250 W HCPCS SELF ADMINISTERED DRUGS (ALT 637 FOR MEDICARE OP, ALT 636 FOR OP/ED): Performed by: NURSE PRACTITIONER

## 2025-02-01 PROCEDURE — 2500000004 HC RX 250 GENERAL PHARMACY W/ HCPCS (ALT 636 FOR OP/ED): Performed by: NURSE PRACTITIONER

## 2025-02-01 PROCEDURE — 36415 COLL VENOUS BLD VENIPUNCTURE: CPT | Performed by: NURSE PRACTITIONER

## 2025-02-01 PROCEDURE — 80048 BASIC METABOLIC PNL TOTAL CA: CPT | Performed by: NURSE PRACTITIONER

## 2025-02-01 PROCEDURE — 2500000001 HC RX 250 WO HCPCS SELF ADMINISTERED DRUGS (ALT 637 FOR MEDICARE OP): Performed by: NURSE PRACTITIONER

## 2025-02-01 PROCEDURE — 82947 ASSAY GLUCOSE BLOOD QUANT: CPT | Mod: 59

## 2025-02-01 RX ORDER — CARVEDILOL 6.25 MG/1
6.25 TABLET ORAL 2 TIMES DAILY
Qty: 60 TABLET | Refills: 0 | Status: SHIPPED | OUTPATIENT
Start: 2025-02-01 | End: 2025-03-03

## 2025-02-01 RX ORDER — POLYETHYLENE GLYCOL 3350 17 G/17G
17 POWDER, FOR SOLUTION ORAL DAILY PRN
Start: 2025-02-01

## 2025-02-01 RX ORDER — CARVEDILOL 6.25 MG/1
6.25 TABLET ORAL 2 TIMES DAILY
Status: DISCONTINUED | OUTPATIENT
Start: 2025-02-01 | End: 2025-02-02 | Stop reason: HOSPADM

## 2025-02-01 RX ORDER — POTASSIUM CHLORIDE 20 MEQ/1
40 TABLET, EXTENDED RELEASE ORAL ONCE
Status: COMPLETED | OUTPATIENT
Start: 2025-02-01 | End: 2025-02-01

## 2025-02-01 RX ORDER — ACETAMINOPHEN 325 MG/1
650 TABLET ORAL EVERY 6 HOURS PRN
Start: 2025-02-01

## 2025-02-01 RX ADMIN — METHOCARBAMOL 500 MG: 500 TABLET ORAL at 13:32

## 2025-02-01 RX ADMIN — ENOXAPARIN SODIUM 40 MG: 40 INJECTION SUBCUTANEOUS at 17:34

## 2025-02-01 RX ADMIN — POTASSIUM CHLORIDE 40 MEQ: 1500 TABLET, EXTENDED RELEASE ORAL at 17:33

## 2025-02-01 RX ADMIN — PANTOPRAZOLE SODIUM 40 MG: 40 TABLET, DELAYED RELEASE ORAL at 06:23

## 2025-02-01 RX ADMIN — INSULIN LISPRO 6 UNITS: 100 INJECTION, SOLUTION INTRAVENOUS; SUBCUTANEOUS at 17:34

## 2025-02-01 RX ADMIN — TRAZODONE HYDROCHLORIDE 50 MG: 50 TABLET ORAL at 21:24

## 2025-02-01 RX ADMIN — METHOCARBAMOL 500 MG: 500 TABLET ORAL at 06:23

## 2025-02-01 RX ADMIN — METHOCARBAMOL 500 MG: 500 TABLET ORAL at 21:24

## 2025-02-01 RX ADMIN — PREGABALIN 200 MG: 100 CAPSULE ORAL at 08:47

## 2025-02-01 RX ADMIN — METOPROLOL TARTRATE 12.5 MG: 25 TABLET, FILM COATED ORAL at 08:48

## 2025-02-01 RX ADMIN — TRIHEXYPHENIDYL HYDROCHLORIDE 2 MG: 2 TABLET ORAL at 17:33

## 2025-02-01 RX ADMIN — OXYCODONE HYDROCHLORIDE 5 MG: 5 TABLET ORAL at 08:56

## 2025-02-01 RX ADMIN — ASPIRIN 81 MG: 81 TABLET, COATED ORAL at 08:48

## 2025-02-01 RX ADMIN — INSULIN GLARGINE 44 UNITS: 100 INJECTION, SOLUTION SUBCUTANEOUS at 21:21

## 2025-02-01 RX ADMIN — DULOXETINE HYDROCHLORIDE 60 MG: 60 CAPSULE, DELAYED RELEASE ORAL at 08:48

## 2025-02-01 RX ADMIN — QUETIAPINE FUMARATE 25 MG: 25 TABLET ORAL at 21:24

## 2025-02-01 RX ADMIN — LITHIUM CARBONATE 300 MG: 300 TABLET, EXTENDED RELEASE ORAL at 08:48

## 2025-02-01 RX ADMIN — ATORVASTATIN CALCIUM 80 MG: 80 TABLET, FILM COATED ORAL at 08:48

## 2025-02-01 RX ADMIN — TRIHEXYPHENIDYL HYDROCHLORIDE 2 MG: 2 TABLET ORAL at 08:47

## 2025-02-01 RX ADMIN — LURASIDONE HYDROCHLORIDE 80 MG: 40 TABLET, FILM COATED ORAL at 21:25

## 2025-02-01 RX ADMIN — ISOSORBIDE MONONITRATE 60 MG: 60 TABLET, EXTENDED RELEASE ORAL at 08:47

## 2025-02-01 RX ADMIN — INSULIN LISPRO 6 UNITS: 100 INJECTION, SOLUTION INTRAVENOUS; SUBCUTANEOUS at 13:32

## 2025-02-01 RX ADMIN — CETIRIZINE HYDROCHLORIDE 10 MG: 10 TABLET, FILM COATED ORAL at 08:47

## 2025-02-01 RX ADMIN — TAMSULOSIN HYDROCHLORIDE 0.4 MG: 0.4 CAPSULE ORAL at 06:23

## 2025-02-01 RX ADMIN — CARVEDILOL 6.25 MG: 6.25 TABLET, FILM COATED ORAL at 21:24

## 2025-02-01 RX ADMIN — PREGABALIN 200 MG: 100 CAPSULE ORAL at 21:24

## 2025-02-01 RX ADMIN — LITHIUM CARBONATE 300 MG: 300 TABLET, EXTENDED RELEASE ORAL at 21:24

## 2025-02-01 ASSESSMENT — COGNITIVE AND FUNCTIONAL STATUS - GENERAL
CLIMB 3 TO 5 STEPS WITH RAILING: A LITTLE
TOILETING: A LITTLE
MOVING TO AND FROM BED TO CHAIR: A LITTLE
TURNING FROM BACK TO SIDE WHILE IN FLAT BAD: A LITTLE
DAILY ACTIVITIY SCORE: 18
HELP NEEDED FOR BATHING: A LITTLE
DRESSING REGULAR UPPER BODY CLOTHING: A LITTLE
PERSONAL GROOMING: A LITTLE
MOVING FROM LYING ON BACK TO SITTING ON SIDE OF FLAT BED WITH BEDRAILS: A LITTLE
STANDING UP FROM CHAIR USING ARMS: A LITTLE
DRESSING REGULAR LOWER BODY CLOTHING: A LITTLE
WALKING IN HOSPITAL ROOM: A LITTLE
MOBILITY SCORE: 18
EATING MEALS: A LITTLE

## 2025-02-01 ASSESSMENT — PAIN SCALES - GENERAL
PAINLEVEL_OUTOF10: 0 - NO PAIN
PAINLEVEL_OUTOF10: 0 - NO PAIN

## 2025-02-01 NOTE — DISCHARGE INSTRUCTIONS
You will need to follow up with your usual PCP regarding the need for a repeat CT of the chest to follow up on lung nodules that were noted on this admission. It is recommended that you have a repeat CT of chest in 3 months, please speak with your doctor about this.     CT chest on 1/31/25 with   Subpleural nodules of 0.6 cm within the right upper lobe and right   middle lobe and Nodule of 0.7 cm seen within the right costophrenic sulcus.

## 2025-02-01 NOTE — CONSULTS
Consults    R07.9 Chest pain  I25.1 Past medical history CAD status post stent  I10.0 Hypertension  E78.5 Dyslipidemia  F14.10 Polysubstance history  U07.91 Recent second COVID pneumonia  E11.9 Type 2 diabetes    I spoke to him and to his family stated the pain was somewhat atypical pleuritic after PNA and COVID but because of his numerous risk factors recommended an outpatient nuclear stress test placed ambulatory order he can be discharged      Current chest pain atypical with no EKG changes troponin elevation or response to nitroglycerin does have CAD risk factors and should have cardiology follow-up as an outpatient    I found a remote bare-metal vision stent to the mid circumflex 2012 by Dr. Eng at [facility] a normal stress echo at [facility] in 2022 and a normal nuclear at the clinic in 2020 no ACS event occurred during the stress of recent surgery infection and debridement pain is somewhat atypical and reproducible and there is a past history of polysubstance use cocaine    Would use carvedilol as the beta-blocker because of his polysubstance use and continue high-dose statins      History Of Present Illness:    Brannon Engel is a 69 y.o. male presenting with sharp moderate intensity left-sided chest pain associated with dizziness nausea nonradiating.  Admitted to observation for MI rule out and evaluation past medical history of CAD status post PCI and stent hypertension dyslipidemia PAD BPH DVT not on anticoagulation.  Patient had recent penile implant complicated by infection scrotal abscess requiring removal of the implant and debridement.  Past medical history of NIKITA anxiety GERD BPH depression esophageal dysphagia lives at skilled nursing developed chest pain yesterday multiple doses nitroglycerin unresponsive of note patient COVID recovery last 10 days treated for post COVID-pneumonia chest pain was midsternal worse with cough unchanged at other times reproducible chest pain pressure appears atypical to be  cardiac    In the ED troponin 7 6 D-dimer 2887 CBC unremarkable with no left shift CT negative for PE or aneurysm mild to moderate emphysema EKG was normal with no acute ST-T changes    Epic review was seen August 28, 2022 by Bharat Escobar chest pain history of MI troponins noted 1 pack a day smoking history polysubstance use with cocaine social drinker previous stent in 2017 diabetes with nephropathy bipolar disorder because of cocaine use was changed to carvedilol and high-dose statins with planned cardiology follow-up I found normal stress echo in 2021 normal nuclear 2018 at the clinic I also found that at [facility] J Luis Eng did a bare-metal stent 3 x 23 for single-vessel coronary disease to the circumflex with IVUS guidance.  In 2012    I do see a normal stress echo at [facility] February 11, 2021 was done    Summary    Normal LV systolic function.  The left ventricular ejection fraction (LVEF) is 60%.  Normal RV systolic function.  No left ventricular hypertrophy is present.  No hemodynamically significant valve disease.  Noninvasive hemodynamic assessment is consistent with a low CVP.  The pulmonary artery systolic pressure could not be estimated.  See above for further details.    Authenticated by:    Electronically signed and authenticated by HONORIO HUTTON MD(Interpreting physician) on 02/11/2021 03:36 PM      A normal nuclear pharmacologic stress test was done at the clinic with Dr. Sybil edward in March 2016 with normal function    PCI procedure: Coronary Stent , Stent, Coronary IVUS       Miscellaneous: ACT      Complications:       - *No Complications       Conclusions       Summary       Single vessel coronary artery disease involving the LCX     Elevated left ventricular end-diastolic pressure.       Successful IVUS-guided PCI of Mid LCX, with Bare Metal stent Vision 3.0 mm     x 23 mm with good angiographic results.       Recommendations       Aspirin 325 mg/day for 1 months and then 81 mg daily  indefinitely     Plavix 75 mg/day for at least 12 months       Signatures       ----------------------------------------------------------------     Electronically signed by Santana Eng MD     ([id number])(Attending Physician) on 01/10/2012 12:13 PM     ----------------------------------------------------------------      Last Recorded Vitals:  Vitals:    02/01/25 0000 02/01/25 0425 02/01/25 0800 02/01/25 1200   BP: 112/67 121/75 126/76 93/55   BP Location: Right arm Right arm Right arm Right arm   Patient Position: Lying Lying Lying Lying   Pulse: 96 105 88 106   Resp: 16 16 16 18   Temp: 36.9 °C (98.4 °F) 36.4 °C (97.5 °F) 36.7 °C (98.1 °F) 36.1 °C (97 °F)   TempSrc: Temporal Temporal Temporal Temporal   SpO2: 94% 100% 95%    Weight:       Height:           Last Labs:  CBC - 2/1/2025:  5:35 AM  9.0 11.5 307    39.4      CMP - 2/1/2025:  5:35 AM  8.8 7.5 23 --- 0.4   _ 3.5 15 74      PTT - No results in last year.  1.3   14.1 _     Troponin I, High Sensitivity   Date/Time Value Ref Range Status   01/31/2025 11:57 AM 6 0 - 20 ng/L Final   01/31/2025 10:49 AM 7 0 - 20 ng/L Final     Troponin I   Date/Time Value Ref Range Status   10/17/2022 12:52 PM 9 0 - 20 ng/L Final     Comment:     .  Less than 99th percentile of normal range cutoff-  Female and children under 18 years old <14 ng/L; Male <21 ng/L: Negative  Repeat testing should be performed if clinically indicated.   .  Female and children under 18 years old 14-50 ng/L; Male 21-50 ng/L:  Consistent with possible cardiac damage and possible increased clinical   risk. Serial measurements may help to assess extent of myocardial damage.   .  >50 ng/L: Consistent with cardiac damage, increased clinical risk and  myocardial infarction. Serial measurements may help assess extent of   myocardial damage.   .   NOTE: Children less than 1 year old may have higher baseline troponin   levels and results should be interpreted in conjunction with the overall   clinical  context.   .  NOTE: Troponin I testing is performed using a different   testing methodology at [facility] than at other   [facility] hospitals. Direct result comparisons should only   be made within the same method.     10/17/2022 11:35 AM 9 0 - 20 ng/L Final     Comment:     .  Less than 99th percentile of normal range cutoff-  Female and children under 18 years old <14 ng/L; Male <21 ng/L: Negative  Repeat testing should be performed if clinically indicated.   .  Female and children under 18 years old 14-50 ng/L; Male 21-50 ng/L:  Consistent with possible cardiac damage and possible increased clinical   risk. Serial measurements may help to assess extent of myocardial damage.   .  >50 ng/L: Consistent with cardiac damage, increased clinical risk and  myocardial infarction. Serial measurements may help assess extent of   myocardial damage.   .   NOTE: Children less than 1 year old may have higher baseline troponin   levels and results should be interpreted in conjunction with the overall   clinical context.   .  NOTE: Troponin I testing is performed using a different   testing methodology at [facility] than at other   [facility] hospitals. Direct result comparisons should only   be made within the same method.     08/27/2022 03:25 PM 52 (H) 0 - 20 ng/L Final     Comment:     .  Less than 99th percentile of normal range cutoff-  Female and children under 18 years old <14 ng/L; Male <21 ng/L: Negative  Repeat testing should be performed if clinically indicated.   .  Female and children under 18 years old 14-50 ng/L; Male 21-50 ng/L:  Consistent with possible cardiac damage and possible increased clinical   risk. Serial measurements may help to assess extent of myocardial damage.   .  >50 ng/L: Consistent with cardiac damage, increased clinical risk and  myocardial infarction. Serial measurements may help assess extent of   myocardial damage.   .   NOTE: Children less than 1 year old may have  "higher baseline troponin   levels and results should be interpreted in conjunction with the overall   clinical context.   .  NOTE: Troponin I testing is performed using a different   testing methodology at [facility] than at other   [facility] hospitals. Direct result comparisons should only   be made within the same method.  This is a critical result.    Per Laboratory policy, critical results for this test   only qualify to the call list once per 24 hours.        BNP   Date/Time Value Ref Range Status   01/31/2025 10:49 AM 49 0 - 99 pg/mL Final     Hemoglobin A1C   Date/Time Value Ref Range Status   09/17/2024 03:39 PM 6.3 (H) 4.0 - 5.6 % Final   04/19/2024 03:03 PM 6 (H) 4.0 - 5.6 % Final     VLDL   Date/Time Value Ref Range Status   08/28/2022 04:57 AM 33 0 - 40 mg/dL Final      Last I/O:  I/O last 3 completed shifts:  In: 1240 (15.7 mL/kg) [P.O.:240; IV Piggyback:1000]  Out: 1550 (19.7 mL/kg) [Urine:1550 (0.5 mL/kg/hr)]  Weight: 78.8 kg     Past Cardiology Tests (Last 3 Years):  EKG:  ECG 12 lead 01/31/2025 (Preliminary)      ECG 12 lead 01/31/2025 (Preliminary)    Echo:  No results found for this or any previous visit from the past 1095 days.    Ejection Fractions:  No results found for: \"EF\"  Cath:  No results found for this or any previous visit from the past 1095 days.    Stress Test:  No results found for this or any previous visit from the past 1095 days.    Cardiac Imaging:  No results found for this or any previous visit from the past 1095 days.        Past Medical History:  He has a past medical history of Anxiety, BPH (benign prostatic hyperplasia), Chronic indwelling Moya catheter, Depression, DVT (deep venous thrombosis) (Multi), Dysphagia, Emphysema lung (Multi), GERD (gastroesophageal reflux disease), HLD (hyperlipidemia), Hypertension, essential, Low back pain, MI, old, NIKITA (obstructive sleep apnea), PAD (peripheral artery disease) (CMS-HCC), PVD (peripheral vascular disease) " (CMS-[facility]), Schizophrenia, and Type 2 diabetes mellitus.    Past Surgical History:  He has a past surgical history that includes Cardiac catheterization; Hip Arthroplasty; and Nissen fundoplication.      Social History:  He reports that he has quit smoking. His smoking use included cigarettes. He has never used smokeless tobacco. No history on file for alcohol use and drug use.    Family History:  Family History   Problem Relation Name Age of Onset    No Known Problems Mother      No Known Problems Father          Allergies:  Ace inhibitors    Inpatient Medications:  Scheduled medications   Medication Dose Route Frequency    aspirin  81 mg oral Daily    atorvastatin  80 mg oral Daily    cetirizine  10 mg oral Daily    DULoxetine  60 mg oral Daily    enoxaparin  40 mg subcutaneous q24h    insulin glargine  44 Units subcutaneous Nightly    insulin lispro  6 Units subcutaneous TID    isosorbide mononitrate ER  60 mg oral Daily    lithium ER  300 mg oral BID    lurasidone  80 mg oral Nightly    methocarbamol  500 mg oral q8h VILMA    metoprolol tartrate  12.5 mg oral BID    pantoprazole  40 mg oral Daily before breakfast    pregabalin  200 mg oral BID    QUEtiapine  25 mg oral Nightly    tamsulosin  0.4 mg oral Daily    traZODone  50 mg oral Nightly    trihexyphenidyl  2 mg oral BID     PRN medications   Medication    acetaminophen    Or    acetaminophen    Or    acetaminophen    albuterol    benzocaine-menthol    calcium carbonate    nitroglycerin    oxyCODONE    polyethylene glycol    sennosides     Continuous Medications   Medication Dose Last Rate     Outpatient Medications:  Current Outpatient Medications   Medication Instructions    acetaminophen (TYLENOL) 650 mg, oral, Every 4 hours PRN    albuterol 90 mcg/actuation inhaler 2 puffs, Every 4 hours PRN    ascorbic acid (VITAMIN C) 500 mg, Daily    aspirin 81 mg, Daily    atorvastatin (LIPITOR) 80 mg, Daily    benzocaine-menthol (Cepacol Sore Throat, beth-men,) lozenge  1 lozenge, Mouth/Throat, Every 4 hours PRN    calcium carbonate-vitamin D3 500 mg-5 mcg (200 unit) tablet 1 tablet, 2 times daily    cyanocobalamin (VITAMIN B-12) 1,000 mcg, Daily    diclofenac sodium (VOLTAREN ARTHRITIS PAIN) 4 g, 4 times daily PRN    DULoxetine (CYMBALTA) 60 mg, Daily    esomeprazole (NEXIUM) 40 mg, 2 times daily    insulin lispro 6 Units, subcutaneous, 3 times daily (morning, midday, late afternoon), Take as directed per insulin instructions.    isosorbide mononitrate ER (IMDUR) 60 mg, Daily    Lantus U-100 Insulin 44 Units, Nightly    lidocaine 4 % patch 1 patch, transdermal, Daily, Remove & discard patch within 12 hours or as directed by MD.    lidocaine 4 % patch 1 patch, transdermal, Daily, Remove & discard patch within 12 hours or as directed by MD.    liraglutide (VICTOZA 2-ANNE MARIE) 1.8 mg, Daily    lithium ER (LITHOBID) 300 mg, 2 times daily    loratadine (CLARITIN) 10 mg, oral, Daily PRN    lurasidone (LATUDA) 80 mg, Nightly    methocarbamol (ROBAXIN) 500 mg, oral, Every 8 hours scheduled    metoprolol tartrate (LOPRESSOR) 12.5 mg, 2 times daily    multivitamin tablet 1 tablet, Daily    nitroglycerin (NITROSTAT) 0.4 mg, Every 5 min PRN    oxyCODONE (ROXICODONE) 5 mg, Every 8 hours PRN    polyethylene glycol (GLYCOLAX, MIRALAX) 17 g, oral, Daily    polyethylene glycol (GLYCOLAX, MIRALAX) 17 g, oral, Daily PRN    pregabalin (LYRICA) 200 mg, 2 times daily    QUEtiapine (SEROquel) 25 mg tablet Take 1 tablet daily at bedtimes    tamsulosin (FLOMAX) 0.4 mg, oral, Daily RT    traZODone (DESYREL) 50 mg, oral, Nightly    triamcinolone (Nasacort) 55 mcg nasal inhaler 2 sprays, Every 8 hours PRN    trihexyphenidyl (ARTANE) 2 mg, 2 times daily     Results for orders placed or performed during the hospital encounter of 01/31/25 (from the past 96 hours)   ECG 12 lead   Result Value Ref Range    Ventricular Rate 99 BPM    Atrial Rate 99 BPM    RI Interval 170 ms    QRS Duration 76 ms    QT Interval 330 ms     QTC Calculation(Bazett) 423 ms    P Axis 59 degrees    R Axis 33 degrees    T Axis 76 degrees    QRS Count 17 beats    Q Onset 226 ms    P Onset 141 ms    P Offset 194 ms    T Offset 391 ms    QTC Fredericia 389 ms   CBC with Differential   Result Value Ref Range    WBC 11.2 4.4 - 11.3 x10*3/uL    nRBC 0.0 0.0 - 0.0 /100 WBCs    RBC 4.77 4.50 - 5.90 x10*6/uL    Hemoglobin 13.2 (L) 13.5 - 17.5 g/dL    Hematocrit 43.3 41.0 - 52.0 %    MCV 91 80 - 100 fL    MCH 27.7 26.0 - 34.0 pg    MCHC 30.5 (L) 32.0 - 36.0 g/dL    RDW 15.6 (H) 11.5 - 14.5 %    Platelets 315 150 - 450 x10*3/uL    Neutrophils % 72.1 40.0 - 80.0 %    Immature Granulocytes %, Automated 0.4 0.0 - 0.9 %    Lymphocytes % 18.3 13.0 - 44.0 %    Monocytes % 6.5 2.0 - 10.0 %    Eosinophils % 2.4 0.0 - 6.0 %    Basophils % 0.3 0.0 - 2.0 %    Neutrophils Absolute 8.05 (H) 1.20 - 7.70 x10*3/uL    Immature Granulocytes Absolute, Automated 0.04 0.00 - 0.70 x10*3/uL    Lymphocytes Absolute 2.04 1.20 - 4.80 x10*3/uL    Monocytes Absolute 0.72 0.10 - 1.00 x10*3/uL    Eosinophils Absolute 0.27 0.00 - 0.70 x10*3/uL    Basophils Absolute 0.03 0.00 - 0.10 x10*3/uL   Comprehensive Metabolic Panel   Result Value Ref Range    Glucose 130 (H) 74 - 99 mg/dL    Sodium 136 136 - 145 mmol/L    Potassium 3.7 3.5 - 5.3 mmol/L    Chloride 105 98 - 107 mmol/L    Bicarbonate 22 21 - 32 mmol/L    Anion Gap 13 10 - 20 mmol/L    Urea Nitrogen 10 6 - 23 mg/dL    Creatinine 1.06 0.50 - 1.30 mg/dL    eGFR 77 >60 mL/min/1.73m*2    Calcium 9.0 8.6 - 10.3 mg/dL    Albumin 3.5 3.4 - 5.0 g/dL    Alkaline Phosphatase 74 33 - 136 U/L    Total Protein 7.5 6.4 - 8.2 g/dL    AST 23 9 - 39 U/L    Bilirubin, Total 0.4 0.0 - 1.2 mg/dL    ALT 15 10 - 52 U/L   Protime-INR   Result Value Ref Range    Protime 14.1 (H) 9.8 - 12.8 seconds    INR 1.3 (H) 0.9 - 1.1   Troponin I, High Sensitivity, Initial   Result Value Ref Range    Troponin I, High Sensitivity 7 0 - 20 ng/L   Magnesium   Result Value Ref  Range    Magnesium 1.89 1.60 - 2.40 mg/dL   D-Dimer, VTE Exclusion   Result Value Ref Range    D-Dimer, Quantitative VTE Exclusion 2,887 (H) <=500 ng/mL FEU   B-type natriuretic peptide   Result Value Ref Range    BNP 49 0 - 99 pg/mL   Troponin, High Sensitivity, 1 Hour   Result Value Ref Range    Troponin I, High Sensitivity 6 0 - 20 ng/L   ECG 12 lead   Result Value Ref Range    Ventricular Rate 95 BPM    Atrial Rate 95 BPM    AL Interval 176 ms    QRS Duration 76 ms    QT Interval 346 ms    QTC Calculation(Bazett) 434 ms    P Axis 48 degrees    R Axis 24 degrees    T Axis 62 degrees    QRS Count 15 beats    Q Onset 226 ms    P Onset 138 ms    P Offset 192 ms    T Offset 399 ms    QTC Fredericia 403 ms   Drug Screen, Urine   Result Value Ref Range    Amphetamine Screen, Urine Presumptive Negative Presumptive Negative    Barbiturate Screen, Urine Presumptive Negative Presumptive Negative    Benzodiazepines Screen, Urine Presumptive Negative Presumptive Negative    Cannabinoid Screen, Urine Presumptive Negative Presumptive Negative    Cocaine Metabolite Screen, Urine Presumptive Negative Presumptive Negative    Fentanyl Screen, Urine Presumptive Negative Presumptive Negative    Opiate Screen, Urine Presumptive Negative Presumptive Negative    Oxycodone Screen, Urine Presumptive Positive (A) Presumptive Negative    PCP Screen, Urine Presumptive Negative Presumptive Negative    Methadone Screen, Urine Presumptive Negative Presumptive Negative   POCT GLUCOSE   Result Value Ref Range    POCT Glucose 131 (H) 74 - 99 mg/dL   Basic metabolic panel   Result Value Ref Range    Glucose 83 74 - 99 mg/dL    Sodium 136 136 - 145 mmol/L    Potassium 3.3 (L) 3.5 - 5.3 mmol/L    Chloride 107 98 - 107 mmol/L    Bicarbonate 20 (L) 21 - 32 mmol/L    Anion Gap 12 10 - 20 mmol/L    Urea Nitrogen 8 6 - 23 mg/dL    Creatinine 0.91 0.50 - 1.30 mg/dL    eGFR >90 >60 mL/min/1.73m*2    Calcium 8.8 8.6 - 10.3 mg/dL   CBC   Result Value Ref  Range    WBC 9.0 4.4 - 11.3 x10*3/uL    nRBC 0.0 0.0 - 0.0 /100 WBCs    RBC 4.36 (L) 4.50 - 5.90 x10*6/uL    Hemoglobin 11.5 (L) 13.5 - 17.5 g/dL    Hematocrit 39.4 (L) 41.0 - 52.0 %    MCV 90 80 - 100 fL    MCH 26.4 26.0 - 34.0 pg    MCHC 29.2 (L) 32.0 - 36.0 g/dL    RDW 15.5 (H) 11.5 - 14.5 %    Platelets 307 150 - 450 x10*3/uL   POCT GLUCOSE   Result Value Ref Range    POCT Glucose 104 (H) 74 - 99 mg/dL   POCT GLUCOSE   Result Value Ref Range    POCT Glucose 188 (H) 74 - 99 mg/dL         Physical Exam:  Subjective:   Examination:   General Examination:   General Appearance: Well developed, well nourished, in no acute distress.   Head: normocephalic, atraumatic   Eyes: Anicteric sclera. Pupils are equally round and reactive to light.  Extraocular movements are intact.    Ears: normal   Oral: Cavity: mucosa moist.   Throat: clear.   Neck/Thyroid: neck supple, full range of motion, no cervical lymphadenopathy.   Skin: warm and dry, no suspicious lesions.    Heart: regular rate and rhythm, S1, S2 normal, no murmurs.  Chest pain somewhat reproducible to touch as well as cough  Lungs: clear to auscultation bilaterally.   Abdomen: soft, non-tender, non-distended, bowl sound present, normal.   Extremities: no clubbing, no cyanosis, no edema.   Neuro: non-focal, motor strength normal upper lower extremities, sensory exam intact.       Assessment/Plan     R07.9 Chest pain  I25.1 Past medical history CAD status post stent  I10.0 Hypertension  E78.5 Dyslipidemia  F14.10 Polysubstance history  U07.91 Recent second COVID pneumonia      Current chest pain atypical with no EKG changes troponin elevation or response to nitroglycerin does have CAD risk factors and should have cardiology follow-up as an outpatient    I found a remote bare-metal vision stent to the mid circumflex 2012 by Dr. Eng at [facility] a normal stress echo at [facility] in 2022 and a normal nuclear at the clinic in 2020 no ACS event occurred during the stress of  recent surgery infection and debridement pain is somewhat atypical and reproducible and there is a past history of polysubstance use cocaine    Would use carvedilol as the beta-blocker because of his polysubstance use and continue high-dose statins    Code Status:  Full Code    I spent 65 minutes in the professional and overall care of this patient.        Tirso Lauren MD

## 2025-02-01 NOTE — CARE PLAN
The patient's goals for the shift include  rest    The clinical goals for the shift include pt will be free from falls        Problem: Fall/Injury  Goal: Be free from injury by end of the shift  2/1/2025 0237 by Gaston Tejeda RN  Outcome: Progressing  2/1/2025 0237 by Gaston Tejeda, RN  Outcome: Progressing

## 2025-02-01 NOTE — PROGRESS NOTES
02/01/25 0904   Discharge Planning   Living Arrangements Alone   Support Systems Children   Type of Residence Nursing home/residential care   Home or Post Acute Services Post acute facilities (Rehab/SNF/etc)   Type of Post Acute Facility Services Long term care   Expected Discharge Disposition Inter   Does the patient need discharge transport arranged? Yes   RoundTrip coordination needed? Yes     Pt arrived from [facility] for chest pain. Pt is a long term care resident at [facility]. Requested [facility] send return ICF referral.

## 2025-02-01 NOTE — CARE PLAN
The patient's goals for the shift include      The clinical goals for the shift include Pt will be free of chest pain and falls

## 2025-02-01 NOTE — DISCHARGE SUMMARY
Discharge Diagnosis  Chest pain, unspecified type    Issues Requiring Follow-Up  ***    Discharge Meds     Medication List      START taking these medications     carvedilol 6.25 mg tablet; Commonly known as: Coreg; Take 1 tablet (6.25   mg) by mouth 2 times a day.     CHANGE how you take these medications     polyethylene glycol 17 gram packet; Commonly known as: Glycolax,   Miralax; Take 17 g by mouth once daily as needed (constipation).; What   changed: when to take this, reasons to take this, Another medication with   the same name was removed. Continue taking this medication, and follow the   directions you see here.     CONTINUE taking these medications     acetaminophen 325 mg tablet; Commonly known as: Tylenol; Take 2 tablets   (650 mg) by mouth every 6 hours if needed for mild pain (1 - 3).   albuterol 90 mcg/actuation inhaler   ascorbic acid 500 mg tablet; Commonly known as: Vitamin C   aspirin 81 mg EC tablet   atorvastatin 80 mg tablet; Commonly known as: Lipitor   calcium carbonate-vitamin D3 500 mg-5 mcg (200 unit) tablet   Cepacol Sore Throat (beth-men) 15-3.6 mg lozenge; Generic drug:   benzocaine-menthol   cyanocobalamin 1,000 mcg tablet; Commonly known as: Vitamin B-12   DULoxetine 60 mg DR capsule; Commonly known as: Cymbalta   esomeprazole 40 mg DR capsule; Commonly known as: NexIUM   insulin lispro 100 unit/mL injection; Inject 6 Units under the skin 3   times daily (morning, midday, late afternoon). Take as directed per   insulin instructions.   isosorbide mononitrate ER 60 mg 24 hr tablet; Commonly known as: Imdur   Lantus U-100 Insulin 100 unit/mL injection; Generic drug: insulin   glargine   Latuda 80 mg tablet; Generic drug: lurasidone   lithium  mg 12 hr tablet; Commonly known as: Lithobid   loratadine 10 mg tablet; Commonly known as: Claritin   methocarbamol 500 mg tablet; Commonly known as: Robaxin; Take 1 tablet   (500 mg) by mouth every 8 hours.   multivitamin tablet    nitroglycerin 0.4 mg SL tablet; Commonly known as: Nitrostat   oxyCODONE 5 mg immediate release tablet; Commonly known as: Roxicodone   pregabalin 200 mg capsule; Commonly known as: Lyrica   QUEtiapine 25 mg tablet; Commonly known as: SEROquel   tamsulosin 0.4 mg 24 hr capsule; Commonly known as: Flomax; Take 1   capsule (0.4 mg) by mouth once daily.   traZODone 50 mg tablet; Commonly known as: Desyrel; Take 1 tablet (50   mg) by mouth once daily at bedtime.   triamcinolone 55 mcg nasal inhaler; Commonly known as: Nasacort   trihexyphenidyl 2 mg tablet; Commonly known as: Artane   Victoza 2-Ruy 0.6 mg/0.1 mL (18 mg/3 mL) injection; Generic drug:   liraglutide   Voltaren Arthritis Pain 1 % gel; Generic drug: diclofenac sodium     STOP taking these medications     lidocaine 4 % patch   metoprolol tartrate 25 mg tablet; Commonly known as: Lopressor       Test Results Pending At Discharge  Pending Labs       No current pending labs.            Hospital Course   ***    Pertinent Physical Exam At Time of Discharge  Physical Exam  HENT:      Head: Normocephalic.      Mouth/Throat:      Pharynx: Oropharynx is clear.   Eyes:      Conjunctiva/sclera: Conjunctivae normal.   Cardiovascular:      Rate and Rhythm: Regular rhythm.   Pulmonary:      Breath sounds: Normal breath sounds.   Abdominal:      General: Bowel sounds are normal.      Palpations: Abdomen is soft.   Musculoskeletal:         General: Normal range of motion.   Skin:     General: Skin is warm and dry.   Neurological:      General: No focal deficit present.      Mental Status: He is alert.   Psychiatric:         Behavior: Behavior normal.         Outpatient Follow-Up  No future appointments.      Rashaad Scott MD           Outpatient Follow-Up  Follow-up with PCP      Rashaad Scott MD

## 2025-02-02 VITALS
HEIGHT: 70 IN | WEIGHT: 173.72 LBS | OXYGEN SATURATION: 96 % | DIASTOLIC BLOOD PRESSURE: 69 MMHG | RESPIRATION RATE: 16 BRPM | BODY MASS INDEX: 24.87 KG/M2 | TEMPERATURE: 97.3 F | SYSTOLIC BLOOD PRESSURE: 111 MMHG | HEART RATE: 72 BPM

## 2025-02-02 LAB
ANION GAP SERPL CALC-SCNC: 13 MMOL/L (ref 10–20)
BUN SERPL-MCNC: 11 MG/DL (ref 6–23)
CALCIUM SERPL-MCNC: 8.7 MG/DL (ref 8.6–10.3)
CHLORIDE SERPL-SCNC: 109 MMOL/L (ref 98–107)
CO2 SERPL-SCNC: 20 MMOL/L (ref 21–32)
CREAT SERPL-MCNC: 0.91 MG/DL (ref 0.5–1.3)
EGFRCR SERPLBLD CKD-EPI 2021: >90 ML/MIN/1.73M*2
ERYTHROCYTE [DISTWIDTH] IN BLOOD BY AUTOMATED COUNT: 15.7 % (ref 11.5–14.5)
GLUCOSE BLD MANUAL STRIP-MCNC: 131 MG/DL (ref 74–99)
GLUCOSE SERPL-MCNC: 97 MG/DL (ref 74–99)
HCT VFR BLD AUTO: 38.6 % (ref 41–52)
HGB BLD-MCNC: 11.4 G/DL (ref 13.5–17.5)
MCH RBC QN AUTO: 26.5 PG (ref 26–34)
MCHC RBC AUTO-ENTMCNC: 29.5 G/DL (ref 32–36)
MCV RBC AUTO: 90 FL (ref 80–100)
NRBC BLD-RTO: 0 /100 WBCS (ref 0–0)
PLATELET # BLD AUTO: 295 X10*3/UL (ref 150–450)
POTASSIUM SERPL-SCNC: 3.5 MMOL/L (ref 3.5–5.3)
RBC # BLD AUTO: 4.3 X10*6/UL (ref 4.5–5.9)
SODIUM SERPL-SCNC: 138 MMOL/L (ref 136–145)
WBC # BLD AUTO: 8.8 X10*3/UL (ref 4.4–11.3)

## 2025-02-02 PROCEDURE — 36415 COLL VENOUS BLD VENIPUNCTURE: CPT | Performed by: NURSE PRACTITIONER

## 2025-02-02 PROCEDURE — 82947 ASSAY GLUCOSE BLOOD QUANT: CPT | Mod: 59

## 2025-02-02 PROCEDURE — 85027 COMPLETE CBC AUTOMATED: CPT | Performed by: NURSE PRACTITIONER

## 2025-02-02 PROCEDURE — G0378 HOSPITAL OBSERVATION PER HR: HCPCS

## 2025-02-02 PROCEDURE — 2500000002 HC RX 250 W HCPCS SELF ADMINISTERED DRUGS (ALT 637 FOR MEDICARE OP, ALT 636 FOR OP/ED): Performed by: NURSE PRACTITIONER

## 2025-02-02 PROCEDURE — 2500000001 HC RX 250 WO HCPCS SELF ADMINISTERED DRUGS (ALT 637 FOR MEDICARE OP): Performed by: INTERNAL MEDICINE

## 2025-02-02 PROCEDURE — 2500000001 HC RX 250 WO HCPCS SELF ADMINISTERED DRUGS (ALT 637 FOR MEDICARE OP): Performed by: NURSE PRACTITIONER

## 2025-02-02 PROCEDURE — 80048 BASIC METABOLIC PNL TOTAL CA: CPT | Performed by: NURSE PRACTITIONER

## 2025-02-02 RX ADMIN — ASPIRIN 81 MG: 81 TABLET, COATED ORAL at 09:27

## 2025-02-02 RX ADMIN — TRIHEXYPHENIDYL HYDROCHLORIDE 2 MG: 2 TABLET ORAL at 09:27

## 2025-02-02 RX ADMIN — OXYCODONE HYDROCHLORIDE 5 MG: 5 TABLET ORAL at 09:28

## 2025-02-02 RX ADMIN — LITHIUM CARBONATE 300 MG: 300 TABLET, EXTENDED RELEASE ORAL at 09:29

## 2025-02-02 RX ADMIN — METHOCARBAMOL 500 MG: 500 TABLET ORAL at 06:25

## 2025-02-02 RX ADMIN — ISOSORBIDE MONONITRATE 60 MG: 60 TABLET, EXTENDED RELEASE ORAL at 09:27

## 2025-02-02 RX ADMIN — DULOXETINE HYDROCHLORIDE 60 MG: 60 CAPSULE, DELAYED RELEASE ORAL at 09:27

## 2025-02-02 RX ADMIN — ACETAMINOPHEN 650 MG: 325 TABLET ORAL at 09:28

## 2025-02-02 RX ADMIN — ATORVASTATIN CALCIUM 80 MG: 80 TABLET, FILM COATED ORAL at 09:27

## 2025-02-02 RX ADMIN — CETIRIZINE HYDROCHLORIDE 10 MG: 10 TABLET, FILM COATED ORAL at 09:27

## 2025-02-02 RX ADMIN — PANTOPRAZOLE SODIUM 40 MG: 40 TABLET, DELAYED RELEASE ORAL at 06:25

## 2025-02-02 RX ADMIN — CARVEDILOL 6.25 MG: 6.25 TABLET, FILM COATED ORAL at 09:27

## 2025-02-02 RX ADMIN — PREGABALIN 200 MG: 100 CAPSULE ORAL at 09:27

## 2025-02-02 RX ADMIN — TAMSULOSIN HYDROCHLORIDE 0.4 MG: 0.4 CAPSULE ORAL at 06:25

## 2025-02-02 ASSESSMENT — PAIN SCALES - GENERAL
PAINLEVEL_OUTOF10: 0 - NO PAIN
PAINLEVEL_OUTOF10: 0 - NO PAIN

## 2025-02-02 NOTE — CARE PLAN
The patient's goals for the shift include  sleep    The clinical goals for the shift include free from falls      Problem: Safety - Adult  Goal: Free from fall injury  Outcome: Not Progressing

## 2025-02-02 NOTE — CARE PLAN
The patient's goals for the shift include      The clinical goals for the shift include Free from falls

## 2025-02-02 NOTE — PROGRESS NOTES
02/02/25 0834   Discharge Planning   Living Arrangements Alone   Support Systems Children   Type of Residence Nursing home/residential care   Home or Post Acute Services Post acute facilities (Rehab/SNF/etc)   Type of Post Acute Facility Services Long term care   Expected Discharge Disposition Inter   Does the patient need discharge transport arranged? Yes   RoundTrip coordination needed? Yes     Medically ready for discharge. Requested D[facility] send GF and AVS to [facility]. Transport scheduled for 1000. Facility and daughter Angelica notified.

## 2025-02-02 NOTE — NURSING NOTE
Attempted to call nurse to nurse report was on hold for 5 min and eventually phone was hung up on me. Report given to transport

## 2025-02-04 LAB
ATRIAL RATE: 95 BPM
ATRIAL RATE: 99 BPM
P AXIS: 48 DEGREES
P AXIS: 59 DEGREES
P OFFSET: 192 MS
P OFFSET: 194 MS
P ONSET: 138 MS
P ONSET: 141 MS
PR INTERVAL: 170 MS
PR INTERVAL: 176 MS
Q ONSET: 226 MS
Q ONSET: 226 MS
QRS COUNT: 15 BEATS
QRS COUNT: 17 BEATS
QRS DURATION: 76 MS
QRS DURATION: 76 MS
QT INTERVAL: 330 MS
QT INTERVAL: 346 MS
QTC CALCULATION(BAZETT): 423 MS
QTC CALCULATION(BAZETT): 434 MS
QTC FREDERICIA: 389 MS
QTC FREDERICIA: 403 MS
R AXIS: 24 DEGREES
R AXIS: 33 DEGREES
T AXIS: 62 DEGREES
T AXIS: 76 DEGREES
T OFFSET: 391 MS
T OFFSET: 399 MS
VENTRICULAR RATE: 95 BPM
VENTRICULAR RATE: 99 BPM